# Patient Record
Sex: MALE | Race: WHITE | Employment: UNEMPLOYED | URBAN - METROPOLITAN AREA
[De-identification: names, ages, dates, MRNs, and addresses within clinical notes are randomized per-mention and may not be internally consistent; named-entity substitution may affect disease eponyms.]

---

## 2018-01-18 ENCOUNTER — HOSPITAL ENCOUNTER (INPATIENT)
Age: 78
LOS: 19 days | Discharge: HOME | DRG: 057 | End: 2018-02-06
Attending: PSYCHIATRY & NEUROLOGY | Admitting: PSYCHIATRY & NEUROLOGY
Payer: MEDICARE

## 2018-01-18 PROCEDURE — 65220000003 HC RM SEMIPRIVATE PSYCH

## 2018-01-18 RX ORDER — ZOLPIDEM TARTRATE 5 MG/1
5 TABLET ORAL
Status: DISCONTINUED | OUTPATIENT
Start: 2018-01-18 | End: 2018-02-06 | Stop reason: HOSPADM

## 2018-01-18 RX ORDER — TAMSULOSIN HYDROCHLORIDE 0.4 MG/1
0.4 CAPSULE ORAL DAILY
COMMUNITY

## 2018-01-18 RX ORDER — IBUPROFEN 200 MG
1 TABLET ORAL
Status: DISCONTINUED | OUTPATIENT
Start: 2018-01-18 | End: 2018-02-06 | Stop reason: HOSPADM

## 2018-01-18 RX ORDER — FACIAL-BODY WIPES
10 EACH TOPICAL
COMMUNITY

## 2018-01-18 RX ORDER — TRAZODONE HYDROCHLORIDE 100 MG/1
50 TABLET ORAL
Status: ON HOLD | COMMUNITY
End: 2018-01-19

## 2018-01-18 RX ORDER — AMOXICILLIN 250 MG
1 CAPSULE ORAL DAILY
Status: ON HOLD | COMMUNITY
End: 2018-01-19

## 2018-01-18 RX ORDER — FINASTERIDE 5 MG/1
5 TABLET, FILM COATED ORAL DAILY
COMMUNITY

## 2018-01-18 RX ORDER — MELATONIN 5 MG
5 CAPSULE ORAL
Status: ON HOLD | COMMUNITY
End: 2018-01-19

## 2018-01-18 RX ORDER — BENZTROPINE MESYLATE 1 MG/ML
1 INJECTION INTRAMUSCULAR; INTRAVENOUS
Status: DISCONTINUED | OUTPATIENT
Start: 2018-01-18 | End: 2018-02-06 | Stop reason: HOSPADM

## 2018-01-18 RX ORDER — KETOCONAZOLE 20 MG/ML
SHAMPOO TOPICAL DAILY PRN
COMMUNITY

## 2018-01-18 RX ORDER — ONDANSETRON 4 MG/1
4 TABLET, FILM COATED ORAL
COMMUNITY
End: 2018-02-06

## 2018-01-18 RX ORDER — POLYETHYLENE GLYCOL 3350 17 G/17G
17 POWDER, FOR SOLUTION ORAL DAILY
COMMUNITY

## 2018-01-18 RX ORDER — IBUPROFEN 400 MG/1
400 TABLET ORAL
Status: DISCONTINUED | OUTPATIENT
Start: 2018-01-18 | End: 2018-02-06 | Stop reason: HOSPADM

## 2018-01-18 RX ORDER — OLANZAPINE 2.5 MG/1
2.5 TABLET ORAL
Status: DISCONTINUED | OUTPATIENT
Start: 2018-01-18 | End: 2018-02-06 | Stop reason: HOSPADM

## 2018-01-18 RX ORDER — GUAIFENESIN 100 MG/5ML
81 LIQUID (ML) ORAL DAILY
COMMUNITY

## 2018-01-18 RX ORDER — RISPERIDONE 1 MG/1
1 TABLET, FILM COATED ORAL DAILY
Status: ON HOLD | COMMUNITY
End: 2018-01-19

## 2018-01-18 RX ORDER — ADHESIVE BANDAGE
30 BANDAGE TOPICAL DAILY PRN
Status: DISCONTINUED | OUTPATIENT
Start: 2018-01-18 | End: 2018-02-06 | Stop reason: HOSPADM

## 2018-01-18 RX ORDER — BENZTROPINE MESYLATE 1 MG/1
1 TABLET ORAL
Status: DISCONTINUED | OUTPATIENT
Start: 2018-01-18 | End: 2018-02-06 | Stop reason: HOSPADM

## 2018-01-18 RX ORDER — ACETAMINOPHEN 325 MG/1
650 TABLET ORAL
Status: DISCONTINUED | OUTPATIENT
Start: 2018-01-18 | End: 2018-02-06 | Stop reason: HOSPADM

## 2018-01-18 RX ORDER — TRIAMCINOLONE ACETONIDE 1 MG/G
OINTMENT TOPICAL 2 TIMES DAILY
Status: ON HOLD | COMMUNITY
End: 2018-01-19

## 2018-01-18 RX ORDER — DIVALPROEX SODIUM 500 MG/1
500 TABLET, EXTENDED RELEASE ORAL
Status: ON HOLD | COMMUNITY
End: 2018-01-19 | Stop reason: DRUGHIGH

## 2018-01-18 NOTE — IP AVS SNAPSHOT
[address] [facility], [place], operated by [facility] 
 
 
 [address] Patient: Marco Antonio Lam MRN: [id number] P:[birth year] About your hospitalization You were admitted on:  January 18, 2018 You last received care in the:  [facility]. 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 Amy Oakes [address] [place] [address], [address] [place] [address] Phone: [phone] Fax: [phone] None   None (395) Patient stated that they have no PCP Discharge Orders None A check stephanie indicates which time of day the medication should be taken. My Medications START taking these medications Instructions Each Dose to Equal  
 Morning Noon Evening Bedtime  
 memantine 10 mg tablet Commonly known as:  Leatha Parekh Your last dose was: Your next dose is: Take 1 Tab by mouth two (2) times a day for 7 days. Indications: MODERATE TO SEVERE ALZHEIMER'S TYPE DEMENTIA 10 mg  
    
   
   
   
  
 mirtazapine 15 mg tablet Commonly known as:  Veola Kitchen Your last dose was: Your next dose is: Take 1 Tab by mouth nightly for 7 days. Indications: major depressive disorder 15 mg  
    
   
   
   
  
 * risperiDONE 1 mg/mL oral solution Commonly known as:  RisperDAL Replaces:  risperiDONE 0.5 mg tablet Your last dose was: Your next dose is: Take 3 mL by mouth nightly for 7 days. Indications: DEPRESSION TREATMENT ADJUNCT, psychosis 3 mg * risperiDONE 1 mg/mL oral solution Commonly known as:  RisperDAL Start taking on:  2/7/2018 Replaces:  risperiDONE 1 mg tablet Your last dose was: Your next dose is: Take 1 mL by mouth daily for 7 days. Indications: DEPRESSION TREATMENT ADJUNCT, psychosis 1 mg * Notice: This list has 2 medication(s) that are the same as other medications prescribed for you. Read the directions carefully, and ask your doctor or other care provider to review them with you. CHANGE how you take these medications Instructions Each Dose to Equal  
 Morning Noon Evening Bedtime * aspirin 81 mg chewable tablet What changed:  Another medication with the same name was added. Make sure you understand how and when to take each. Your last dose was: Your next dose is: Take 81 mg by mouth daily. Indications: myocardial infarction prevention, prevention of cerebrovascular accident 81 mg  
    
   
   
   
  
 * aspirin 81 mg chewable tablet Start taking on:  2/7/2018 What changed: You were already taking a medication with the same name, and this prescription was added. Make sure you understand how and when to take each. Your last dose was: Your next dose is: Take 1 Tab by mouth daily for 7 days. Indications: myocardial infarction prevention, prevention of cerebrovascular accident 81 mg  
    
   
   
   
  
 * FLOMAX 0.4 mg capsule Generic drug:  tamsulosin What changed:  Another medication with the same name was added. Make sure you understand how and when to take each. Your last dose was: Your next dose is: Take 0.4 mg by mouth daily. Indications: benign prostatic hyperplasia with lower urinary tract sx  
 0.4 mg  
    
   
   
   
  
 * tamsulosin 0.4 mg capsule Commonly known as:  FLOMAX Start taking on:  2/7/2018 What changed: You were already taking a medication with the same name, and this prescription was added.  Make sure you understand how and when to take each. Your last dose was: Your next dose is: Take 1 Cap by mouth daily for 7 days. Indications: benign prostatic hyperplasia with lower urinary tract sx  
 0.4 mg  
    
   
   
   
  
 * polyethylene glycol 17 gram packet Commonly known as:  Gaylord Goodpasture What changed:  Another medication with the same name was added. Make sure you understand how and when to take each. Your last dose was: Your next dose is: Take 17 g by mouth daily. Indications: constipation 17 g * polyethylene glycol 17 gram packet Commonly known as:  Bhavesh Goodpasture Start taking on:  2/7/2018 What changed: You were already taking a medication with the same name, and this prescription was added. Make sure you understand how and when to take each. Your last dose was: Your next dose is: Take 1 Packet by mouth daily for 7 days. Indications: constipation 17 g * PROSCAR 5 mg tablet Generic drug:  finasteride What changed:  Another medication with the same name was added. Make sure you understand how and when to take each. Your last dose was: Your next dose is: Take 5 mg by mouth daily. Indications: benign prostatic hyperplasia with lower urinary tract sx  
 5 mg * finasteride 5 mg tablet Commonly known as:  PROSCAR Start taking on:  2/7/2018 What changed: You were already taking a medication with the same name, and this prescription was added. Make sure you understand how and when to take each. Your last dose was: Your next dose is: Take 1 Tab by mouth daily for 7 days. Indications: benign prostatic hyperplasia with lower urinary tract sx  
 5 mg * Senna 8.6 mg tablet Generic drug:  senna What changed:  Another medication with the same name was added. Make sure you understand how and when to take each. Your last dose was: Your next dose is: Take 2 Tabs by mouth nightly. Indications: constipation 2 Tab * senna 8.6 mg tablet Commonly known as:  Senna What changed: You were already taking a medication with the same name, and this prescription was added. Make sure you understand how and when to take each. Your last dose was: Your next dose is: Take 2 Tabs by mouth nightly for 7 days. Indications: constipation 2 Tab * Notice: This list has 10 medication(s) that are the same as other medications prescribed for you. Read the directions carefully, and ask your doctor or other care provider to review them with you. CONTINUE taking these medications Instructions Each Dose to Equal  
 Morning Noon Evening Bedtime DULCOLAX (BISACODYL) 10 mg suppository Generic drug:  bisacodyl Your last dose was: Your next dose is: Insert 10 mg into rectum daily as needed (Constipation). 10 mg  
    
   
   
   
  
 NIZORAL 2 % shampoo Generic drug:  ketoconazole Your last dose was: Your next dose is:    
   
   
 Apply  to affected area daily as needed for Itching. Patient typically uses 2-3 times/week  
     
   
   
   
  
 triamcinolone acetonide 0.1 % topical cream  
Commonly known as:  KENALOG Your last dose was: Your next dose is:    
   
   
 Apply  to affected area two (2) times daily as needed for Skin Irritation. use thin layer STOP taking these medications   
 risperiDONE 0.5 mg tablet Commonly known as:  RisperDAL Replaced by:  risperiDONE 1 mg/mL oral solution  
   
  
 risperiDONE 1 mg tablet Commonly known as:  RisperDAL Replaced by:  risperiDONE 1 mg/mL oral solution ZOFRAN (AS HYDROCHLORIDE) 4 mg tablet Generic drug:  ondansetron hcl ASK your doctor about these medications Instructions Each Dose to Equal  
 Morning Noon Evening Bedtime DEPAKOTE  mg ER tablet Generic drug:  divalproex ER Your last dose was: Your next dose is: Take 500 mg by mouth nightly. Indications: Behavioral disturbances associated with dementia  
 500 mg  
    
   
   
   
  
 * risperiDONE 0.25 mg tablet Commonly known as:  RisperDAL Your last dose was: Your next dose is: Take 0.25 mg by mouth every twelve (12) hours as needed (agitation). 0.25 mg  
    
   
   
   
  
 traZODone 50 mg tablet Commonly known as:  Selestine Baltimore Your last dose was: Your next dose is: Take 25 mg by mouth nightly as needed for Sleep. 25 mg  
    
   
   
   
  
 * Notice: This list has 1 medication(s) that are the same as other medications prescribed for you. Read the directions carefully, and ask your doctor or other care provider to review them with you. Where to Get Your Medications Information on where to get these meds will be given to you by the nurse or doctor. ! Ask your nurse or doctor about these medications  
  aspirin 81 mg chewable tablet  
 finasteride 5 mg tablet  
 memantine 10 mg tablet  
 mirtazapine 15 mg tablet  
 polyethylene glycol 17 gram packet  
 risperiDONE 1 mg/mL oral solution  
 risperiDONE 1 mg/mL oral solution  
 senna 8.6 mg tablet  
 tamsulosin 0.4 mg capsule Discharge Instructions DISCHARGE SUMMARY from Nurse The following personal items are in your possession at time of discharge: 
 
Dental Appliances: None Visual Aid: None Home Medications: None Jewelry: Watch Clothing: Pants, Slippers, Shirt, Undergarments, With patient, Footwear Other Valuables: Shannon Matos Personal Items Sent to Safe:  (wallet) PATIENT INSTRUCTIONS: 
 
 
 
If I feel that I can not keep these promises and I am at risk of hurting myself or others, I will call the crisis office and speak with a crisis worker who will assist me during my crisis. [address] [place] [address]  [phone] [address]   [phone] [facility]  [phone] [facility]  [phone] Lifestyle Tips: 
Appointment after discharge and follow up phone call: After being discharged, if your appointment does not work for you, please feel free to contact the physician's office to change the appointment. Medications: Take your medicines exactly as instructed. Ask your doctor or nurse if you have questions about your medicines. Tell your doctor right away if you have problems with your medicines. Activity: 
 Ask your doctor how active you should be. Remember to take rest breaks. Do not cross your legs when sitting. Elevate your legs when you can. Diet: 
 Follow any special diet orders from your doctor. Ask your doctor how much salt (sodium) you should have each day. Ask if you need to limit the amount or types of fluids you drink each day. Weight Monitoring: If you are overweight, ask about a weight loss plan that is right for you. Weight gain may mean that fluids are building up in your body. Weigh yourself every day at about the same time and write it down. Do Not Smoke: If you smoke, quit. Smoking is bad for your health. Avoid second hand smoke. Call the [address] for help at [phone] [facility]) Other Tips: 
 Avoid people with the flu or pneumonia Keep all of your doctor appointments Carry a list of your medications, allergies and the shots you have had These are general instructions for a healthy lifestyle: No smoking/ No tobacco products/ Avoid exposure to second hand smoke Surgeon General's Warning:  Quitting smoking now greatly reduces serious risk to your health. Obesity, smoking, and sedentary lifestyle greatly increases your risk for illness A healthy diet, regular physical exercise & weight monitoring are important for maintaining a healthy lifestyle You may be retaining fluid if you have a history of heart failure or if you experience any of the following symptoms:  Weight gain of 3 pounds or more overnight or 5 pounds in a week, increased swelling in our hands or feet or shortness of breath while lying flat in bed. Please call your doctor as soon as you notice any of these symptoms; do not wait until your next office visit. Recognize signs and symptoms of STROKE: 
 
F-face looks uneven A-arms unable to move or move unevenly S-speech slurred or non-existent T-time-call 911 as soon as signs and symptoms begin-DO NOT go Back to bed or wait to see if you get better-TIME IS BRAIN. Warning Signs of HEART ATTACK Call 911 if you have these symptoms: 
? Chest discomfort. Most heart attacks involve discomfort in the center of the chest that lasts more than a few minutes, or that goes away and comes back. It can feel like uncomfortable pressure, squeezing, fullness, or pain. ? Discomfort in other areas of the upper body. Symptoms can include pain or discomfort in one or both arms, the back, neck, jaw, or stomach. ? Shortness of breath with or without chest discomfort. ? Other signs may include breaking out in a cold sweat, nausea, or lightheadedness. Don't wait more than five minutes to call [address]! Fast action can save your life. Calling 911 is almost always the fastest way to get lifesaving treatment. Emergency Medical Services staff can begin treatment when they arrive  up to an hour sooner than if someone gets to the hospital by car. Myself and/or my family have received education about my diagnosis throughout my hospital stay.   During my hospital stay, I and/or my family/caregiver were included in planning my care upon discharge. My needs were taken into consideration and I was included in my discharge planning. I had an opportunity to ask questions. The discharge information has been reviewed with the patient. The patient verbalized understanding. Discharge medications reviewed with the patient and appropriate educational materials and side effects teaching were provided. [employer] Announcement We are excited to announce that we are making your provider's discharge notes available to you in [employer]. You will see these notes when they are completed and signed by the physician that discharged you from your recent hospital stay. If you have any questions or concerns about any information you see in [employer], please call the Health Information Department where you were seen or reach out to your Primary Care Provider for more information about your plan of care. Introducing [facility]! Alex Starkey introduces [employer] patient portal. Now you can access parts of your medical record, email your doctor's office, and request medication refills online. 1. In your internet browser, go to [email or web address] 2. Click on the First Time User? Click Here link in the Sign In box. You will see the New Member Sign Up page. 3. Enter your [employer] Access Code exactly as it appears below. You will not need to use this code after youve completed the sign-up process. If you do not sign up before the expiration date, you must request a new code. · [employer] Access Code: [email or web address] Expires: 5/7/2018 10:49 AM 
 
4. Enter the last four digits of your Social Security Number (xxxx) and Date of Birth (mm/dd/yyyy) as indicated and click Submit. You will be taken to the next sign-up page. 5. Create a [employer] ID.  This will be your [employer] login ID and cannot be changed, so think of one that is secure and easy to remember. 6. Create a [employer] password. You can change your password at any time. 7. Enter your Password Reset Question and Answer. This can be used at a later time if you forget your password. 8. Enter your e-mail address. You will receive e-mail notification when new information is available in [address] 9. Click Sign Up. You can now view and download portions of your medical record. 10. Click the Download Summary menu link to download a portable copy of your medical information. If you have questions, please visit the Frequently Asked Questions section of the [employer] website. Remember, [employer] is NOT to be used for urgent needs. For medical emergencies, dial 911. Now available from your iPhone and Android! Providers Seen During Your Hospitalization Provider Specialty Primary office phone Jozef Rosales MD Psychiatry [phone] Your Primary Care Physician (PCP) Primary Care Physician Office Phone Office Fax NONE ** None ** ** None ** You are allergic to the following Allergen Reactions Erythromycin Hives Isopropyl Alcohol Hives Neosporin (Neomycin-Bacitracnzn-Polymyxnb) Rash Pcn (Penicillins) Rash  
    
 Sulfa (Sulfonamide Antibiotics) Rash Recent Documentation Height Weight BMI Smoking Status 1.778 m 69.4 kg 21.95 kg/m2 Unknown If Ever Smoked Emergency Contacts Name Discharge Info Relation Home Work Mobile Not,Given N/A  AT THIS TIME [6] Other Relative [6] [phone] Patient Belongings The following personal items are in your possession at time of discharge: 
  Dental Appliances: None  Visual Aid: None      Home Medications: None   Jewelry: Watch  Clothing: Pants, Slippers, Shirt, Undergarments, With patient, Footwear    Other Valuables: Layman Adam Arora  Personal Items Sent to Safe:  (wallet) Please provide this summary of care documentation to your next provider. Signatures-by signing, you are acknowledging that this After Visit Summary has been reviewed with you and you have received a copy. Patient Signature:  ____________________________________________________________ Date:  ____________________________________________________________  
  
Shahram Kin Provider Signature:  ____________________________________________________________ Date:  ____________________________________________________________

## 2018-01-18 NOTE — IP AVS SNAPSHOT
[facility] 
 
 
 [address] [place] Patient: Frances Elder MRN: [id number] QFK:[id number] A check stephanie indicates which time of day the medication should be taken. My Medications START taking these medications Instructions Each Dose to Equal  
 Morning Noon Evening Bedtime  
 memantine 10 mg tablet Commonly known as:  Irina Iglesias Your last dose was: Your next dose is: Take 1 Tab by mouth two (2) times a day for 7 days. Indications: MODERATE TO SEVERE ALZHEIMER'S TYPE DEMENTIA 10 mg  
    
   
   
   
  
 mirtazapine 15 mg tablet Commonly known as:  Maria A Luna Your last dose was: Your next dose is: Take 1 Tab by mouth nightly for 7 days. Indications: major depressive disorder 15 mg  
    
   
   
   
  
 * risperiDONE 1 mg/mL oral solution Commonly known as:  RisperDAL Replaces:  risperiDONE 0.5 mg tablet Your last dose was: Your next dose is: Take 3 mL by mouth nightly for 7 days. Indications: DEPRESSION TREATMENT ADJUNCT, psychosis 3 mg * risperiDONE 1 mg/mL oral solution Commonly known as:  RisperDAL Start taking on:  2/7/2018 Replaces:  risperiDONE 1 mg tablet Your last dose was: Your next dose is: Take 1 mL by mouth daily for 7 days. Indications: DEPRESSION TREATMENT ADJUNCT, psychosis 1 mg * Notice: This list has 2 medication(s) that are the same as other medications prescribed for you. Read the directions carefully, and ask your doctor or other care provider to review them with you. CHANGE how you take these medications Instructions Each Dose to Equal  
 Morning Noon Evening Bedtime * aspirin 81 mg chewable tablet What changed:  Another medication with the same name was added. Make sure you understand how and when to take each. Your last dose was: Your next dose is: Take 81 mg by mouth daily. Indications: myocardial infarction prevention, prevention of cerebrovascular accident 81 mg  
    
   
   
   
  
 * aspirin 81 mg chewable tablet Start taking on:  2/7/2018 What changed: You were already taking a medication with the same name, and this prescription was added. Make sure you understand how and when to take each. Your last dose was: Your next dose is: Take 1 Tab by mouth daily for 7 days. Indications: myocardial infarction prevention, prevention of cerebrovascular accident 81 mg  
    
   
   
   
  
 * FLOMAX 0.4 mg capsule Generic drug:  tamsulosin What changed:  Another medication with the same name was added. Make sure you understand how and when to take each. Your last dose was: Your next dose is: Take 0.4 mg by mouth daily. Indications: benign prostatic hyperplasia with lower urinary tract sx  
 0.4 mg  
    
   
   
   
  
 * tamsulosin 0.4 mg capsule Commonly known as:  FLOMAX Start taking on:  2/7/2018 What changed: You were already taking a medication with the same name, and this prescription was added. Make sure you understand how and when to take each. Your last dose was: Your next dose is: Take 1 Cap by mouth daily for 7 days. Indications: benign prostatic hyperplasia with lower urinary tract sx  
 0.4 mg  
    
   
   
   
  
 * polyethylene glycol 17 gram packet Commonly known as:  Kaitlynn Canter What changed:  Another medication with the same name was added. Make sure you understand how and when to take each. Your last dose was: Your next dose is: Take 17 g by mouth daily. Indications: constipation 17 g * polyethylene glycol 17 gram packet Commonly known as:  Kaitlynn Canter Start taking on:  2/7/2018 What changed: You were already taking a medication with the same name, and this prescription was added. Make sure you understand how and when to take each. Your last dose was: Your next dose is: Take 1 Packet by mouth daily for 7 days. Indications: constipation 17 g * PROSCAR 5 mg tablet Generic drug:  finasteride What changed:  Another medication with the same name was added. Make sure you understand how and when to take each. Your last dose was: Your next dose is: Take 5 mg by mouth daily. Indications: benign prostatic hyperplasia with lower urinary tract sx  
 5 mg * finasteride 5 mg tablet Commonly known as:  PROSCAR Start taking on:  2/7/2018 What changed: You were already taking a medication with the same name, and this prescription was added. Make sure you understand how and when to take each. Your last dose was: Your next dose is: Take 1 Tab by mouth daily for 7 days. Indications: benign prostatic hyperplasia with lower urinary tract sx  
 5 mg * Senna 8.6 mg tablet Generic drug:  senna What changed:  Another medication with the same name was added. Make sure you understand how and when to take each. Your last dose was: Your next dose is: Take 2 Tabs by mouth nightly. Indications: constipation 2 Tab * senna 8.6 mg tablet Commonly known as:  Senna What changed: You were already taking a medication with the same name, and this prescription was added. Make sure you understand how and when to take each. Your last dose was: Your next dose is: Take 2 Tabs by mouth nightly for 7 days. Indications: constipation 2 Tab * Notice:   This list has 10 medication(s) that are the same as other medications prescribed for you. Read the directions carefully, and ask your doctor or other care provider to review them with you. CONTINUE taking these medications Instructions Each Dose to Equal  
 Morning Noon Evening Bedtime DULCOLAX (BISACODYL) 10 mg suppository Generic drug:  bisacodyl Your last dose was: Your next dose is: Insert 10 mg into rectum daily as needed (Constipation). 10 mg  
    
   
   
   
  
 NIZORAL 2 % shampoo Generic drug:  ketoconazole Your last dose was: Your next dose is:    
   
   
 Apply  to affected area daily as needed for Itching. Patient typically uses 2-3 times/week  
     
   
   
   
  
 triamcinolone acetonide 0.1 % topical cream  
Commonly known as:  KENALOG Your last dose was: Your next dose is:    
   
   
 Apply  to affected area two (2) times daily as needed for Skin Irritation. use thin layer STOP taking these medications   
 risperiDONE 0.5 mg tablet Commonly known as:  RisperDAL Replaced by:  risperiDONE 1 mg/mL oral solution  
   
  
 risperiDONE 1 mg tablet Commonly known as:  RisperDAL Replaced by:  risperiDONE 1 mg/mL oral solution ZOFRAN (AS HYDROCHLORIDE) 4 mg tablet Generic drug:  ondansetron hcl ASK your doctor about these medications Instructions Each Dose to Equal  
 Morning Noon Evening Bedtime DEPAKOTE  mg ER tablet Generic drug:  divalproex ER Your last dose was: Your next dose is: Take 500 mg by mouth nightly. Indications: Behavioral disturbances associated with dementia  
 500 mg  
    
   
   
   
  
 * risperiDONE 0.25 mg tablet Commonly known as:  RisperDAL Your last dose was: Your next dose is: Take 0.25 mg by mouth every twelve (12) hours as needed (agitation). 0.25 mg  
    
   
   
   
  
 traZODone 50 mg tablet Commonly known as:  Juan Thomason Your last dose was: Your next dose is: Take 25 mg by mouth nightly as needed for Sleep. 25 mg  
    
   
   
   
  
 * Notice: This list has 1 medication(s) that are the same as other medications prescribed for you. Read the directions carefully, and ask your doctor or other care provider to review them with you. Where to Get Your Medications Information on where to get these meds will be given to you by the nurse or doctor. ! Ask your nurse or doctor about these medications  
  aspirin 81 mg chewable tablet  
 finasteride 5 mg tablet  
 memantine 10 mg tablet  
 mirtazapine 15 mg tablet  
 polyethylene glycol 17 gram packet  
 risperiDONE 1 mg/mL oral solution  
 risperiDONE 1 mg/mL oral solution  
 senna 8.6 mg tablet  
 tamsulosin 0.4 mg capsule

## 2018-01-18 NOTE — IP AVS SNAPSHOT
Summary of Care Report The Summary of Care report has been created to help improve care coordination. Users with access to [employer] or [facility] [address] (Web-based application) may access additional patient information including the Discharge Summary. If you are not currently a [address] user and need more information, please call the number listed below in the [place] [address] section and ask to be connected with Medical Records. Facility Information Name Address Phone [address] [place] [id number] [phone] Patient Information Patient Name Sex NASRIN Short ([id number]) Male 1940 Discharge Information Admitting Provider Service Area Unit Nancy Mendez MD / [address] / [phone] Discharge Provider Discharge Date/Time Discharge Disposition Destination (none) 2018 Afternoon (Pending) AHR (none) Patient Language Language ENGLISH [13] Hospital Problems as of 2018  Never Reviewed Class Noted - Resolved Last Modified POA Active Problems Agitation  2018 - Present 2018 by Nancy Mendez MD Yes Entered by Nancy Mendez MD  
  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  
  
Non-Hospital Problems as of 2018  Never Reviewed Class Noted - Resolved Last Modified Active Problems Late onset Alzheimer's disease with behavioral disturbance  2018 - Present 2018 by Nancy Mendez MD  
  Entered by Nancy Mendez MD  
  
You are allergic to the following Allergen Reactions Erythromycin Hives Isopropyl Alcohol Hives Neosporin (Neomycin-Bacitracnzn-Polymyxnb) Rash Pcn (Penicillins) Rash  
    
 Sulfa (Sulfonamide Antibiotics) Rash Current Discharge Medication List  
  
START taking these medications Dose & Instructions Dispensing Information Comments  
 memantine 10 mg tablet Commonly known as:  Irene Price Dose:  10 mg Take 1 Tab by mouth two (2) times a day for 7 days. Indications: MODERATE TO SEVERE ALZHEIMER'S TYPE DEMENTIA Quantity:  14 Tab Refills:  0  
   
 mirtazapine 15 mg tablet Commonly known as:  Ford Levee Dose:  15 mg Take 1 Tab by mouth nightly for 7 days. Indications: major depressive disorder Quantity:  7 Tab Refills:  0  
   
 * risperiDONE 1 mg/mL oral solution Commonly known as:  RisperDAL Replaces:  risperiDONE 0.5 mg tablet Dose:  3 mg Take 3 mL by mouth nightly for 7 days. Indications: DEPRESSION TREATMENT ADJUNCT, psychosis Quantity:  21 mL Refills:  0  
   
 * risperiDONE 1 mg/mL oral solution Commonly known as:  RisperDAL Start taking on:  2/7/2018 Replaces:  risperiDONE 1 mg tablet Dose:  1 mg Take 1 mL by mouth daily for 7 days. Indications: DEPRESSION TREATMENT ADJUNCT, psychosis Quantity:  7 mL Refills:  0  
   
 * Notice: This list has 2 medication(s) that are the same as other medications prescribed for you. Read the directions carefully, and ask your doctor or other care provider to review them with you. CONTINUE these medications which have CHANGED Dose & Instructions Dispensing Information Comments * aspirin 81 mg chewable tablet What changed:  Another medication with the same name was added. Make sure you understand how and when to take each. Dose:  81 mg Take 81 mg by mouth daily. Indications: myocardial infarction prevention, prevention of cerebrovascular accident Refills:  0  
   
 * aspirin 81 mg chewable tablet Start taking on:  2/7/2018 What changed:   You were already taking a medication with the same name, and this prescription was added. Make sure you understand how and when to take each. Dose:  81 mg Take 1 Tab by mouth daily for 7 days. Indications: myocardial infarction prevention, prevention of cerebrovascular accident Quantity:  7 Tab Refills:  0  
   
 * FLOMAX 0.4 mg capsule Generic drug:  tamsulosin What changed:  Another medication with the same name was added. Make sure you understand how and when to take each. Dose:  0.4 mg Take 0.4 mg by mouth daily. Indications: benign prostatic hyperplasia with lower urinary tract sx Refills:  0  
   
 * tamsulosin 0.4 mg capsule Commonly known as:  FLOMAX Start taking on:  2/7/2018 What changed: You were already taking a medication with the same name, and this prescription was added. Make sure you understand how and when to take each. Dose:  0.4 mg Take 1 Cap by mouth daily for 7 days. Indications: benign prostatic hyperplasia with lower urinary tract sx Quantity:  7 Cap Refills:  0  
   
 * polyethylene glycol 17 gram packet Commonly known as:  Sallie Bless What changed:  Another medication with the same name was added. Make sure you understand how and when to take each. Dose:  17 g Take 17 g by mouth daily. Indications: constipation Refills:  0  
   
 * polyethylene glycol 17 gram packet Commonly known as:  Sallie Bless Start taking on:  2/7/2018 What changed: You were already taking a medication with the same name, and this prescription was added. Make sure you understand how and when to take each. Dose:  17 g Take 1 Packet by mouth daily for 7 days. Indications: constipation Quantity:  7 Packet Refills:  0  
   
 * PROSCAR 5 mg tablet Generic drug:  finasteride What changed:  Another medication with the same name was added. Make sure you understand how and when to take each. Dose:  5 mg Take 5 mg by mouth daily. Indications: benign prostatic hyperplasia with lower urinary tract sx Refills:  0 * finasteride 5 mg tablet Commonly known as:  PROSCAR Start taking on:  2/7/2018 What changed: You were already taking a medication with the same name, and this prescription was added. Make sure you understand how and when to take each. Dose:  5 mg Take 1 Tab by mouth daily for 7 days. Indications: benign prostatic hyperplasia with lower urinary tract sx Quantity:  7 Tab Refills:  0  
   
 * Senna 8.6 mg tablet Generic drug:  senna What changed:  Another medication with the same name was added. Make sure you understand how and when to take each. Dose:  2 Tab Take 2 Tabs by mouth nightly. Indications: constipation Refills:  0  
   
 * senna 8.6 mg tablet Commonly known as:  Senna What changed: You were already taking a medication with the same name, and this prescription was added. Make sure you understand how and when to take each. Dose:  2 Tab Take 2 Tabs by mouth nightly for 7 days. Indications: constipation Quantity:  14 Tab Refills:  0  
   
 * Notice: This list has 10 medication(s) that are the same as other medications prescribed for you. Read the directions carefully, and ask your doctor or other care provider to review them with you. CONTINUE these medications which have NOT CHANGED Dose & Instructions Dispensing Information Comments DULCOLAX (BISACODYL) 10 mg suppository Generic drug:  bisacodyl Dose:  10 mg Insert 10 mg into rectum daily as needed (Constipation). Refills:  0 NIZORAL 2 % shampoo Generic drug:  ketoconazole Apply  to affected area daily as needed for Itching. Patient typically uses 2-3 times/week Refills:  0  
   
 triamcinolone acetonide 0.1 % topical cream  
Commonly known as:  KENALOG Apply  to affected area two (2) times daily as needed for Skin Irritation. use thin layer Refills:  0 STOP taking these medications Comments  
 risperiDONE 0.5 mg tablet Commonly known as:  RisperDAL Replaced by:  risperiDONE 1 mg/mL oral solution  
   
   
 risperiDONE 1 mg tablet Commonly known as:  RisperDAL Replaced by:  risperiDONE 1 mg/mL oral solution ZOFRAN (AS HYDROCHLORIDE) 4 mg tablet Generic drug:  ondansetron hcl ASK your doctor about these medications Dose & Instructions Dispensing Information Comments DEPAKOTE  mg ER tablet Generic drug:  divalproex ER Dose:  500 mg Take 500 mg by mouth nightly. Indications: Behavioral disturbances associated with dementia Refills:  0  
   
 * risperiDONE 0.25 mg tablet Commonly known as:  RisperDAL Dose:  0.25 mg Take 0.25 mg by mouth every twelve (12) hours as needed (agitation). Refills:  0  
   
 traZODone 50 mg tablet Commonly known as:  Kailey Pacific Dose:  25 mg Take 25 mg by mouth nightly as needed for Sleep. Refills:  0  
   
 * Notice: This list has 1 medication(s) that are the same as other medications prescribed for you. Read the directions carefully, and ask your doctor or other care provider to review them with you. Follow-up Information Follow up With Details Comments Contact Info [facility]   Appointment 2/8/18 @ 1:15pm with Dr. Jeffery Todd [address] [place] [address] Phone: [phone] Fax: [phone] None   None (395) Patient stated that they have no PCP Discharge Instructions DISCHARGE SUMMARY from Nurse The following personal items are in your possession at time of discharge: 
 
Dental Appliances: None Visual Aid: None Home Medications: None Jewelry: Watch Clothing: Pants, Slippers, Shirt, Undergarments, With patient, Footwear Other Valuables: Oscar Stovall Personal Items Sent to Safe:  (wallet) PATIENT INSTRUCTIONS: 
 
 
 
If I feel that I can not keep these promises and I am at risk of hurting myself or others, I will call the crisis office and speak with a crisis worker who will assist me during my crisis. [address] [place] [address]  [phone] [address] [place] [address]   [phone] [address] [employer] [facility]  [phone] [place] [facility]  [phone] Lifestyle Tips: 
Appointment after discharge and follow up phone call: After being discharged, if your appointment does not work for you, please feel free to contact the physician's office to change the appointment. Medications: Take your medicines exactly as instructed. Ask your doctor or nurse if you have questions about your medicines. Tell your doctor right away if you have problems with your medicines. Activity: 
 Ask your doctor how active you should be. Remember to take rest breaks. Do not cross your legs when sitting. Elevate your legs when you can. Diet: 
 Follow any special diet orders from your doctor. Ask your doctor how much salt (sodium) you should have each day. Ask if you need to limit the amount or types of fluids you drink each day. Weight Monitoring: If you are overweight, ask about a weight loss plan that is right for you. Weight gain may mean that fluids are building up in your body. Weigh yourself every day at about the same time and write it down. Do Not Smoke: If you smoke, quit. Smoking is bad for your health. Avoid second hand smoke. Call the [address] for help at [phone] [facility]) Other Tips: 
 Avoid people with the flu or pneumonia Keep all of your doctor appointments Carry a list of your medications, allergies and the shots you have had These are general instructions for a healthy lifestyle: No smoking/ No tobacco products/ Avoid exposure to second hand smoke Surgeon General's Warning:  Quitting smoking now greatly reduces serious risk to your health. Obesity, smoking, and sedentary lifestyle greatly increases your risk for illness A healthy diet, regular physical exercise & weight monitoring are important for maintaining a healthy lifestyle You may be retaining fluid if you have a history of heart failure or if you experience any of the following symptoms:  Weight gain of 3 pounds or more overnight or 5 pounds in a week, increased swelling in our hands or feet or shortness of breath while lying flat in bed. Please call your doctor as soon as you notice any of these symptoms; do not wait until your next office visit. Recognize signs and symptoms of STROKE: 
 
F-face looks uneven A-arms unable to move or move unevenly S-speech slurred or non-existent T-time-call 911 as soon as signs and symptoms begin-DO NOT go Back to bed or wait to see if you get better-TIME IS BRAIN. Warning Signs of HEART ATTACK Call 911 if you have these symptoms: 
? Chest discomfort. Most heart attacks involve discomfort in the center of the chest that lasts more than a few minutes, or that goes away and comes back. It can feel like uncomfortable pressure, squeezing, fullness, or pain. ? Discomfort in other areas of the upper body. Symptoms can include pain or discomfort in one or both arms, the back, neck, jaw, or stomach. ? Shortness of breath with or without chest discomfort. ? Other signs may include breaking out in a cold sweat, nausea, or lightheadedness. Don't wait more than five minutes to call [address]! Fast action can save your life. Calling 911 is almost always the fastest way to get lifesaving treatment. Emergency Medical Services staff can begin treatment when they arrive  up to an hour sooner than if someone gets to the hospital by car. Myself and/or my family have received education about my diagnosis throughout my hospital stay.   During my hospital stay, I and/or my family/caregiver were included in planning my care upon discharge. My needs were taken into consideration and I was included in my discharge planning. I had an opportunity to ask questions. The discharge information has been reviewed with the patient. The patient verbalized understanding. Discharge medications reviewed with the patient and appropriate educational materials and side effects teaching were provided. Chart Review Routing History No Routing History on File

## 2018-01-19 PROBLEM — F02.818 DEMENTIA OF ALZHEIMER'S TYPE WITH BEHAVIORAL DISTURBANCE (HCC): Status: ACTIVE | Noted: 2018-01-19

## 2018-01-19 PROBLEM — F02.818 LATE ONSET ALZHEIMER'S DISEASE WITH BEHAVIORAL DISTURBANCE (HCC): Status: ACTIVE | Noted: 2018-01-19

## 2018-01-19 PROBLEM — R45.1 AGITATION: Status: ACTIVE | Noted: 2018-01-19

## 2018-01-19 PROBLEM — G30.1 LATE ONSET ALZHEIMER'S DISEASE WITH BEHAVIORAL DISTURBANCE (HCC): Status: ACTIVE | Noted: 2018-01-19

## 2018-01-19 PROBLEM — G30.9 DEMENTIA OF ALZHEIMER'S TYPE WITH BEHAVIORAL DISTURBANCE (HCC): Status: ACTIVE | Noted: 2018-01-19

## 2018-01-19 PROCEDURE — 74011250637 HC RX REV CODE- 250/637: Performed by: PSYCHIATRY & NEUROLOGY

## 2018-01-19 PROCEDURE — 65220000003 HC RM SEMIPRIVATE PSYCH

## 2018-01-19 RX ORDER — TAMSULOSIN HYDROCHLORIDE 0.4 MG/1
0.4 CAPSULE ORAL DAILY
Status: DISCONTINUED | OUTPATIENT
Start: 2018-01-19 | End: 2018-02-06 | Stop reason: HOSPADM

## 2018-01-19 RX ORDER — SENNOSIDES 8.6 MG/1
2 TABLET ORAL
COMMUNITY

## 2018-01-19 RX ORDER — TRAZODONE HYDROCHLORIDE 50 MG/1
25 TABLET ORAL
Status: ON HOLD | COMMUNITY
End: 2018-01-19 | Stop reason: DRUGHIGH

## 2018-01-19 RX ORDER — RISPERIDONE 0.5 MG/1
0.5 TABLET, FILM COATED ORAL 2 TIMES DAILY
COMMUNITY
End: 2018-02-06

## 2018-01-19 RX ORDER — RISPERIDONE 0.25 MG/1
0.5 TABLET, FILM COATED ORAL 2 TIMES DAILY
Status: DISCONTINUED | OUTPATIENT
Start: 2018-01-19 | End: 2018-01-21

## 2018-01-19 RX ORDER — TRIAMCINOLONE ACETONIDE 1 MG/G
CREAM TOPICAL
Status: DISCONTINUED | OUTPATIENT
Start: 2018-01-19 | End: 2018-02-06 | Stop reason: HOSPADM

## 2018-01-19 RX ORDER — RISPERIDONE 3 MG/1
3 TABLET, FILM COATED ORAL
Status: DISCONTINUED | OUTPATIENT
Start: 2018-01-19 | End: 2018-01-21

## 2018-01-19 RX ORDER — TRIAMCINOLONE ACETONIDE 1 MG/G
CREAM TOPICAL
COMMUNITY

## 2018-01-19 RX ORDER — SENNOSIDES 8.6 MG/1
2 TABLET ORAL
Status: DISCONTINUED | OUTPATIENT
Start: 2018-01-19 | End: 2018-02-06 | Stop reason: HOSPADM

## 2018-01-19 RX ORDER — POLYETHYLENE GLYCOL 3350 17 G/17G
17 POWDER, FOR SOLUTION ORAL DAILY
Status: DISCONTINUED | OUTPATIENT
Start: 2018-01-19 | End: 2018-02-06 | Stop reason: HOSPADM

## 2018-01-19 RX ORDER — RISPERIDONE 1 MG/1
3 TABLET, FILM COATED ORAL
COMMUNITY
End: 2018-02-06

## 2018-01-19 RX ORDER — GUAIFENESIN 100 MG/5ML
81 LIQUID (ML) ORAL DAILY
Status: DISCONTINUED | OUTPATIENT
Start: 2018-01-19 | End: 2018-02-06 | Stop reason: HOSPADM

## 2018-01-19 RX ORDER — FINASTERIDE 5 MG/1
5 TABLET, FILM COATED ORAL DAILY
Status: DISCONTINUED | OUTPATIENT
Start: 2018-01-19 | End: 2018-02-06 | Stop reason: HOSPADM

## 2018-01-19 RX ORDER — KETOCONAZOLE 20 MG/ML
SHAMPOO TOPICAL DAILY PRN
Status: DISCONTINUED | OUTPATIENT
Start: 2018-01-19 | End: 2018-02-06 | Stop reason: HOSPADM

## 2018-01-19 RX ORDER — RISPERIDONE 0.25 MG/1
0.25 TABLET, FILM COATED ORAL
Status: ON HOLD | COMMUNITY
End: 2018-01-19 | Stop reason: DRUGHIGH

## 2018-01-19 RX ORDER — FACIAL-BODY WIPES
10 EACH TOPICAL
Status: DISCONTINUED | OUTPATIENT
Start: 2018-01-19 | End: 2018-02-06 | Stop reason: HOSPADM

## 2018-01-19 RX ADMIN — OLANZAPINE 2.5 MG: 2.5 TABLET, FILM COATED ORAL at 20:11

## 2018-01-19 RX ADMIN — ASPIRIN 81 MG: 81 TABLET, CHEWABLE ORAL at 12:07

## 2018-01-19 RX ADMIN — RISPERIDONE 0.5 MG: 0.25 TABLET ORAL at 13:41

## 2018-01-19 RX ADMIN — FINASTERIDE 5 MG: 5 TABLET, FILM COATED ORAL at 12:07

## 2018-01-19 RX ADMIN — SENNOSIDES 17.2 MG: 8.6 TABLET, FILM COATED ORAL at 21:28

## 2018-01-19 RX ADMIN — POLYETHYLENE GLYCOL 3350 17 G: 17 POWDER, FOR SOLUTION ORAL at 12:07

## 2018-01-19 RX ADMIN — RISPERIDONE 3 MG: 3 TABLET ORAL at 21:28

## 2018-01-19 RX ADMIN — TAMSULOSIN HYDROCHLORIDE 0.4 MG: 0.4 CAPSULE ORAL at 12:07

## 2018-01-19 NOTE — CONSULTS
[address]    Alivia Dimas  MR#: [id number]  : 1940  ACCOUNT #: [de-identified]   DATE OF SERVICE: 2018    REFERRING PHYSICIAN:  Ward Cheadle, MD.      REASON FOR CONSULTATION:  Medical evaluation psychiatric admission. CHIEF COMPLAINT:  Delusions. HISTORY OF PRESENT ILLNESS:  This 20-year-old male presents delusional with a history of dementia, admitted for further psychiatric evaluation and treatment. Denies any chest pain, shortness of breath, nausea, vomiting or diarrhea. Does not know who his primary care physician  is. PAST MEDICAL HISTORY:  BPH, dementia, dermatitis, mental health disease. PAST SURGICAL HISTORY:  Tonsillectomy. ALLERGIES:  NEOSPORIN, ERYTHROMYCIN, PENICILLIN AND SULFA. MEDICATIONS:  Risperdal 3 mg at bedtime and 0.5 b.i.d., senna 8.6 mg at bedtime, Kenalog 0.1% cream, Proscar 5 mg at bedtime, Zofran 4 mg, MiraLax 17 grams daily, Flomax 0.4 daily. SOCIAL HISTORY:  Denies any tobacco, alcohol or illicit drug use. , has two kids. No grandkids. Says he is a retired . The majority of the history was obtained through medical records. PHYSICAL EXAMINATION:  VITAL SIGNS:  Blood pressure is 120/80, pulse 111, respirations 16, weight 153. GENERAL:  Pleasant, does raise his voice a lot, but in no acute distress. Had his eye shut. THROAT:  Oropharynx is clear. NECK:  Supple. LUNGS:  Clear to auscultation. No wheeze, rales, rhonchi. HEART:  Regular. No murmurs, gallops or rubs. ABDOMEN:  Soft, nontender, nondistended. Normoactive bowel sounds. No hepatosplenomegaly. EXTREMITIES:  No cyanosis, clubbing or edema. LABORATORY DATA:  Pending from outside. IMPRESSION:  A 20-year-old male with a past medical history of dementia, mental health disease, constipation, BPH, dermatitis, presents with delusions admitted for further psychiatric evaluation and treatment. PLAN:    1. Psychiatric management mental health disease. 2.  Take home meds once confirmed by nursing. 3.  Medically stable. Follow up on a p.r.n. basis. 4.  No VTE prophylaxis indicated or warranted at this time. Thank you for this consult.       Chen Duong MD DC / Eliu Mo  D: 01/19/2018 15:02     T: 01/19/2018 15:30  JOB #: [id number]

## 2018-01-19 NOTE — BH NOTES
Pt is disoriented x 4 and totally confused due to late stage AL/dementia. Pt is maintained safe in the unit. Pt is easily agitated due to confusion and pt is unable to follow directions. Will continue to keep pt safe.

## 2018-01-19 NOTE — BH NOTES
This 76year old male admitted to Geriatric unit under the services Dr. Eliu Padgett. Patient has diagnosis of Alzheimer/dementia. Patient has been living in an independent living environment and walks with a walker. Patient became aggressive over time, began yelling, cursing,banging on walls. Patient does not sleep well, up several times and walking outside of room in underwear. Patient is delusional and has been known to elope from previous living environment. Patient is labile and angry when redirected. Food and drink offered, patient refused. Assisted living place in [place] refused to take patient back and patient has been transferred for placement. Patient has family in [place]. The patients son is Rhiannon Rojas  # [phone].

## 2018-01-19 NOTE — PROGRESS NOTES
Problem: Altered Thought Process (Adult/Pediatric)  Goal: *STG: Remains safe in hospital  Outcome: Progressing Towards Goal  Pt slept 2 hours. Pt was very defensive and labile. He wanders at night in a confused and disoriented manner. He removes clothing and selectively responds to redirection. He requires constant redirection. No PRN's were given. Continuing to monitor with q15 min rounds for safety.

## 2018-01-19 NOTE — CONSULTS
Medical Consult for [facility] Patient    Consult H&P   dictated, see patient chart    Impression:    Kian Baca a 76 y.o. male with past medical history of mental health disease, bph, dermatitis, and dementia presents with behavioral health problems of delusions admitted for further psychiatric evaluation and treatment. Plan:   1. Psychiatry to manage mental health issues  2. Continuing home medications,once confirmed. 3. Medically stable at this time, will follow up as needed. 4. No VTE prophylaxis indicated or necessary at this time.      Thank you  Lenny Hameed MD  1/19/2018, 1:44 PM

## 2018-01-19 NOTE — BH NOTES
PSYCHOSOCIAL ASSESSMENT  :Patient identifying info:  Gianni Ansari is a 76 y.o., male admitted 1/18/2018  8:55 PM     Presenting problem and precipitating factors: Pt was admitted on a TDO due to increased  agitation at the assisted living facility. Pt has been more aggressive toward the nurses at the facility. Mental status assessment: Pt was alert and oriented to self. Pt mood was irritable and labile , affect congruent with mood. Pt's thought process is disorganized. Pt's memory and concentration is poor due to his dementia. Current psychiatric providers and contact info: unknown     Previous psychiatric services/providers and response to treatment: Pt has been diagnosised with dementia. Family history of mental illness  Pt's mother had dementia     Substance abuse history:    Social History   Substance Use Topics    Smoking status: Not on file    Smokeless tobacco: Not on file    Alcohol use Not on file   Past history drinking 10 glasses of wine     Family constellation: Pt's son Gianni Ansari, III (son) [phone] ,  Niya Bai (brother) [phone], Jose Juan Caldwell (son) lives in [facility] . Clinician talked with the pt's Naif Pringle III and provided him with the pt's code and hospital information. He was unaware that the pt couldn't return to the facility. He will reach out to them next week. Is significant other involved? Pt is        Describe support system: Pt's children and brother are his support system     Describe living arrangements and home environment: Pt was living at an independent  assisted living [facility] in [place] he is unable to return .      Health issues: Pt uses a walker     Trauma history: unknown     Legal issues: Pt has an advanced directive that appointments several members in his family to make health care decisions (filed in pt chart )    History of  service: unknown     Financial status: Pt receives disability     Anabaptism/cultural factors: unknown     Education/work history: Pt has a Doctorate  degree , he has written 6 books .  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

## 2018-01-19 NOTE — PROGRESS NOTES
Laboratory monitoring for mood stabilizer and antipsychotics:    Recommended baseline monitoring has been completed based on this patient's current medication regimen. The following labs were completed at transferring facility: WBC 6.7, Hgb 16, Hct 46.8, Plts 214, sodium 140, potassium 4, BUN 13, creatinine 0.8, glucose 105, calcium 9.2, AST 37, ALT 34, alk phos 83, total bili 0.8, albumin 4, TSH 0.88, UDS + benzo     The patient is currently taking the following medication(s):   Current Facility-Administered Medications   Medication Dose Route Frequency    aspirin chewable tablet 81 mg  81 mg Oral DAILY    finasteride (PROSCAR) tablet 5 mg  5 mg Oral DAILY    polyethylene glycol (MIRALAX) packet 17 g  17 g Oral DAILY    risperiDONE (RisperDAL) tablet 0.5 mg  0.5 mg Oral BID    risperiDONE (RisperDAL) tablet 3 mg  3 mg Oral QHS    senna (SENOKOT) tablet 17.2 mg  2 Tab Oral QHS    tamsulosin (FLOMAX) capsule 0.4 mg  0.4 mg Oral DAILY       Serum Drug Level(s)  CARBAMAZEPINE:  N/A    LITHIUM:  N/A    VALPROIC ACID:  N/A    Height, Weight, BMI Estimation  Estimated body mass index is 21.95 kg/(m^2) as calculated from the following:    Height as of this encounter: 177.8 cm (70\"). Weight as of this encounter: 69.4 kg (153 lb).      Renal Function, Hepatic Function and Chemistry  See above    Lab Results   Component Value Date/Time    Hemoglobin A1c 5.6 01/20/2018 04:06 AM       Hematology  See above    Lipids  Lab Results   Component Value Date/Time    Cholesterol, total 193 01/20/2018 04:06 AM    HDL Cholesterol 48 01/20/2018 04:06 AM    LDL, calculated 124.2 01/20/2018 04:06 AM    Triglyceride 104 01/20/2018 04:06 AM    CHOL/HDL Ratio 4.0 01/20/2018 04:06 AM       Thyroid Function  See above    Vitals  Visit Vitals    /77 (BP Patient Position: Sitting)    Pulse (!) 114    Temp 97 °F (36.1 °C)    Resp 18    Ht 177.8 cm (70\")    Wt 69.4 kg (153 lb)    BMI 21.95 kg/m2       Ming Hurt, [place], BCPS  Contact: [phone]

## 2018-01-19 NOTE — PROGRESS NOTES
Problem: Suicide/Homicide (Adult/Pediatric)  Goal: *STG: Attends activities and groups  [employer]  Master Treatment Plan for Janel Branch    Date Treatment Plan Initiated: *1/19/2018**    Treatment Plan Modalities:  Type of Modality Amount  (x minutes) Frequency (x/week) Duration (x days) Name of Responsible Staff   Community & wrap-up meetings to encourage peer interactions 30 5 5   SARAH Grider   Group psychotherapy to assist in building coping skills and internal controls       Therapeutic activity groups to build coping skills 60 5 5 Tammi Whitlock   Psychoeducation in group setting to address:   Medication education   61 2 2 Trix Krystle 85 skills         Relaxation techniques         Symptom management         Discharge planning         Spirituality          DEJAN         Recovery/AA/NA         Physician medication management         Family meeting/discharge planning not applicable

## 2018-01-19 NOTE — PROGRESS NOTES
[facility] Admission Pharmacy Medication Reconciliation     Recommendations/Findings:   1) Added: risperidone 0.25 mg, senna  2) Modified: divalproex, risperidone, trazodone, triamcinolone cream  3) Removed: melatonin (staff noted it was not effective), senna-docusate  4) Per [facility] nurse Autumn Ellis, the patient's 24/7 caregivers are responsible for administering him medications. The [facility] staff fill a medication pill box for the caregivers so she is not able to address patient adherence to his medications. Total Time Spent: 45 minutes    Information obtained from: [employer] ([phone]), transfer paperwork from [facility]    Patient allergies: Allergies as of 01/18/2018 - Review Complete 01/18/2018   Allergen Reaction Noted    Erythromycin Hives 01/18/2018    Isopropyl alcohol Hives 01/18/2018    Neosporin [neomycin-bacitracnzn-polymyxnb] Rash 01/18/2018    Pcn [penicillins] Rash 01/18/2018    Sulfa (sulfonamide antibiotics) Rash 01/18/2018       Prior to Admission Medications   Prescriptions Last Dose Informant Patient Reported? Taking?   aspirin 81 mg chewable tablet 1/18/2018 at Unknown time  Yes Yes   Sig: Take 81 mg by mouth daily. Indications: myocardial infarction prevention, prevention of cerebrovascular accident   bisacodyl (DULCOLAX, BISACODYL,) 10 mg suppository   Yes Yes   Sig: Insert 10 mg into rectum daily as needed (Constipation). divalproex ER (DEPAKOTE ER) 500 mg ER tablet 1/18/2018 at Unknown time  Yes Yes   Sig: Take 500 mg by mouth nightly. Indications: Behavioral disturbances associated with dementia   finasteride (PROSCAR) 5 mg tablet 1/18/2018 at Unknown time  Yes Yes   Sig: Take 5 mg by mouth daily. Indications: benign prostatic hyperplasia with lower urinary tract sx   ketoconazole (NIZORAL) 2 % shampoo   Yes Yes   Sig: Apply  to affected area daily as needed for Itching.  Patient typically uses 2-3 times/week   ondansetron hcl (ZOFRAN, AS HYDROCHLORIDE,) 4 mg tablet 1/18/2018 at Unknown time  Yes Yes   Sig: Take 4 mg by mouth every eight (8) hours as needed for Nausea. polyethylene glycol (MIRALAX) 17 gram packet   Yes Yes   Sig: Take 17 g by mouth daily. Indications: constipation   risperiDONE (RISPERDAL) 0.25 mg tablet   Yes Yes   Sig: Take 0.25 mg by mouth every twelve (12) hours as needed (agitation). risperiDONE (RISPERDAL) 0.5 mg tablet   Yes Yes   Sig: Take 0.5 mg by mouth two (2) times a day. At 6 AM and 2 PM   Indications: Behavioral disturbances associated with dementia   risperiDONE (RISPERDAL) 1 mg tablet   Yes Yes   Sig: Take 3 mg by mouth nightly. Indications: Behavioral disturbances associated with dementia   senna (SENNA) 8.6 mg tablet   Yes Yes   Sig: Take 2 Tabs by mouth nightly. Indications: constipation   tamsulosin (FLOMAX) 0.4 mg capsule   Yes Yes   Sig: Take 0.4 mg by mouth daily. Indications: benign prostatic hyperplasia with lower urinary tract sx   traZODone (DESYREL) 50 mg tablet   Yes Yes   Sig: Take 25 mg by mouth nightly as needed for Sleep.   triamcinolone acetonide (KENALOG) 0.1 % topical cream   Yes Yes   Sig: Apply  to affected area two (2) times daily as needed for Skin Irritation.  use thin layer      Facility-Administered Medications: None       Thank you,  [place] [employer], [facility]  [phone]

## 2018-01-20 LAB
CHOLEST SERPL-MCNC: 193 MG/DL
EST. AVERAGE GLUCOSE BLD GHB EST-MCNC: 114 MG/DL
HBA1C MFR BLD: 5.6 % (ref 4.2–6.3)
HDLC SERPL-MCNC: 48 MG/DL
HDLC SERPL: 4 {RATIO} (ref 0–5)
LDLC SERPL CALC-MCNC: 124.2 MG/DL (ref 0–100)
LIPID PROFILE,FLP: ABNORMAL
TRIGL SERPL-MCNC: 104 MG/DL (ref ?–150)
VLDLC SERPL CALC-MCNC: 20.8 MG/DL

## 2018-01-20 PROCEDURE — 74011250637 HC RX REV CODE- 250/637: Performed by: PSYCHIATRY & NEUROLOGY

## 2018-01-20 PROCEDURE — 65220000003 HC RM SEMIPRIVATE PSYCH

## 2018-01-20 PROCEDURE — 80061 LIPID PANEL: CPT | Performed by: PSYCHIATRY & NEUROLOGY

## 2018-01-20 PROCEDURE — 36415 COLL VENOUS BLD VENIPUNCTURE: CPT | Performed by: PSYCHIATRY & NEUROLOGY

## 2018-01-20 PROCEDURE — 83036 HEMOGLOBIN GLYCOSYLATED A1C: CPT | Performed by: PSYCHIATRY & NEUROLOGY

## 2018-01-20 RX ORDER — MIRTAZAPINE 15 MG/1
15 TABLET, FILM COATED ORAL
Status: DISCONTINUED | OUTPATIENT
Start: 2018-01-20 | End: 2018-02-06 | Stop reason: HOSPADM

## 2018-01-20 RX ADMIN — SENNOSIDES 17.2 MG: 8.6 TABLET, FILM COATED ORAL at 21:15

## 2018-01-20 RX ADMIN — RISPERIDONE 0.5 MG: 0.25 TABLET ORAL at 05:03

## 2018-01-20 RX ADMIN — RISPERIDONE 3 MG: 3 TABLET ORAL at 21:15

## 2018-01-20 RX ADMIN — OLANZAPINE 2.5 MG: 2.5 TABLET, FILM COATED ORAL at 07:58

## 2018-01-20 RX ADMIN — TAMSULOSIN HYDROCHLORIDE 0.4 MG: 0.4 CAPSULE ORAL at 07:58

## 2018-01-20 RX ADMIN — RISPERIDONE 0.5 MG: 0.25 TABLET ORAL at 13:35

## 2018-01-20 RX ADMIN — POLYETHYLENE GLYCOL 3350 17 G: 17 POWDER, FOR SOLUTION ORAL at 07:58

## 2018-01-20 RX ADMIN — ASPIRIN 81 MG: 81 TABLET, CHEWABLE ORAL at 07:58

## 2018-01-20 RX ADMIN — MIRTAZAPINE 15 MG: 15 TABLET, FILM COATED ORAL at 21:15

## 2018-01-20 RX ADMIN — FINASTERIDE 5 MG: 5 TABLET, FILM COATED ORAL at 07:57

## 2018-01-20 NOTE — BH NOTES
PSYCHIATRIC PROGRESS NOTE         Patient Name  Diana Duran   Date of Birth 1940   [facility] [id number]   Medical Record Number  [id number]      Age  76 y.o. PCP None   Admit date:  1/18/2018    Room Number  321/01  @ [facility]   Date of Service  1/20/2018           E & M PROGRESS NOTE:         HISTORY       CC:  \" confused , disoriented\"  HISTORY OF PRESENT ILLNESS/INTERVAL HISTORY:  (reviewed/updated 1/20/2018). per initial evaluation: The patient, Diana Duran, is a 76 y.o. WHITE [place]  male with a past psychiatric history significant for dementia, who presents at this time with complaints of (and/or evidence of) the following emotional symptoms: agitation and memory impairment. Additional symptomatology include poor attention, confused, disoriented to time and place. He is a poor historian, distractible, loud and wandering behavior. The above symptoms have been present since last october. These symptoms are of severe severity. These symptoms are constant  in nature. The patient's condition has been precipitated by and psychosocial stressors ( ). Patient's condition made worse by treatment noncompliance. UDS: +benzo; BAL=0. Diana Duran presents/reports/evidences the following emotional symptoms today, 1/20/2018:agitation and paranoid behavior. The above symptoms have been present for few months. These symptoms are of severe severity. The symptoms are constant in nature. Additional symptomatology and features include memory impairment, episodic agitation and poor sleep. Pt has been posturing and threatening staff and received prn med. Wandering behavior but redirectable. SIDE EFFECTS: (reviewed/updated 1/20/2018)  None reported or admitted to.   No noted toxicity with use of Depakote/Tegretol/lithium/Clozaril/TCAs   ALLERGIES:(reviewed/updated 1/20/2018)  Allergies   Allergen Reactions    Erythromycin Hives    Isopropyl Alcohol Hives    Neosporin [Neomycin-Bacitracnzn-Polymyxnb] Rash    Pcn [Penicillins] Rash    Sulfa (Sulfonamide Antibiotics) Rash      MEDICATIONS PRIOR TO ADMISSION:(reviewed/updated 1/20/2018)  Prescriptions Prior to Admission   Medication Sig    risperiDONE (RISPERDAL) 1 mg tablet Take 3 mg by mouth nightly. Indications: Behavioral disturbances associated with dementia    risperiDONE (RISPERDAL) 0.5 mg tablet Take 0.5 mg by mouth two (2) times a day. At 6 AM and 2 PM   Indications: Behavioral disturbances associated with dementia    senna (SENNA) 8.6 mg tablet Take 2 Tabs by mouth nightly. Indications: constipation    triamcinolone acetonide (KENALOG) 0.1 % topical cream Apply  to affected area two (2) times daily as needed for Skin Irritation. use thin layer    aspirin 81 mg chewable tablet Take 81 mg by mouth daily. Indications: myocardial infarction prevention, prevention of cerebrovascular accident    bisacodyl (DULCOLAX, BISACODYL,) 10 mg suppository Insert 10 mg into rectum daily as needed (Constipation).  finasteride (PROSCAR) 5 mg tablet Take 5 mg by mouth daily. Indications: benign prostatic hyperplasia with lower urinary tract sx    ketoconazole (NIZORAL) 2 % shampoo Apply  to affected area daily as needed for Itching. Patient typically uses 2-3 times/week    ondansetron hcl (ZOFRAN, AS HYDROCHLORIDE,) 4 mg tablet Take 4 mg by mouth every eight (8) hours as needed for Nausea.  polyethylene glycol (MIRALAX) 17 gram packet Take 17 g by mouth daily. Indications: constipation    tamsulosin (FLOMAX) 0.4 mg capsule Take 0.4 mg by mouth daily. Indications: benign prostatic hyperplasia with lower urinary tract sx      PAST MEDICAL HISTORY: Past medical history from the initial psychiatric evaluation has been reviewed (reviewed/updated 1/20/2018) with no additional updates (I asked patient and no additional past medical history provided).  Past Medical History:   Diagnosis Date    BPH (benign prostatic hyperplasia)    No past surgical history on file. SOCIAL HISTORY: Social history from the initial psychiatric evaluation has been reviewed (reviewed/updated 1/20/2018) with no additional updates (I asked patient and no additional social history provided). Social History     Social History    Marital status: SINGLE     Spouse name: N/A    Number of children: N/A    Years of education: N/A     Occupational History    Not on file. 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 No family history on file.     REVIEW OF SYSTEMS: (reviewed/updated 1/20/2018)  Appetite:no change from normal   Sleep: does not feel rested and poor with DIMS (difficulty initiating & maintaining sleep)   All other Review of Systems: Psychological ROS: positive for - behavioral disorder, concentration difficulties, disorientation, hostility, memory difficulties and sleep disturbances  Respiratory ROS: no cough, shortness of breath, or wheezing  Cardiovascular ROS: no chest pain or dyspnea on exertion         [facility] (MSE):    MSE FINDINGS ARE WITHIN NORMAL LIMITS (WNL) UNLESS OTHERWISE STATED BELOW. ( ALL OF THE BELOW CATEGORIES OF THE MSE HAVE BEEN REVIEWED (reviewed 1/20/2018) AND UPDATED AS DEEMED APPROPRIATE )  General Presentation age appropriate and disheveled, uncooperative and unreliable   Orientation confused, Alert and Oriented x 1   Vital Signs  See below (reviewed 1/20/2018); Vital Signs (BP, Pulse, & Temp) are within normal limits if not listed below.    Gait and Station Stable/steady, no ataxia   Musculoskeletal System No extrapyramidal symptoms (EPS); no abnormal muscular movements or Tardive Dyskinesia (TD); muscle strength and tone are within normal limits   Language No aphasia or dysarthria   Speech:  monotone and pressured   Thought Processes illogical; slow rate of thoughts; poor abstract reasoning/computation   Thought Associations tangential   Thought Content paranoid delusions, free of hallucinations, confabulation  and internally preoccupied   Suicidal Ideations none   Homicidal Ideations none   Mood:  hostile  and anxious    Affect:  anxious, hostile, increased in intensity and mood-incongruent   Memory recent  impaired   Memory remote:  impaired   Concentration/Attention:  distractable   Fund of Knowledge average   Insight:  poor   Reliability poor   Judgment:  poor          VITALS:     Patient Vitals for the past 24 hrs:   Temp Pulse Resp BP   01/20/18 0420 97 °F (36.1 °C) (!) 114 18 118/77     Wt Readings from Last 3 Encounters:   01/18/18 69.4 kg (153 lb)     Temp Readings from Last 3 Encounters:   01/20/18 97 °F (36.1 °C)     BP Readings from Last 3 Encounters:   01/20/18 118/77     Pulse Readings from Last 3 Encounters:   01/20/18 (!) 114            DATA     LABORATORY DATA:(reviewed/updated 1/20/2018)  Recent Results (from the past 24 hour(s))   LIPID PANEL    Collection Time: 01/20/18  4:06 AM   Result Value Ref Range    LIPID PROFILE          Cholesterol, total 193 <200 MG/DL    Triglyceride 104 <150 MG/DL    HDL Cholesterol 48 MG/DL    LDL, calculated 124.2 (H) 0 - 100 MG/DL    VLDL, calculated 20.8 MG/DL    CHOL/HDL Ratio 4.0 0 - 5.0     HEMOGLOBIN A1C WITH EAG    Collection Time: 01/20/18  4:06 AM   Result Value Ref Range    Hemoglobin A1c 5.6 4.2 - 6.3 %    Est. average glucose 114 mg/dL     No results found for: VALF2, VALAC, VALP, VALPR, DS6, CRBAM, CRBAMP, CARB2, XCRBAM  No results found for: LITHM   RADIOLOGY REPORTS:(reviewed/updated 1/20/2018)  No results found.        MEDICATIONS     ALL MEDICATIONS:   Current Facility-Administered Medications   Medication Dose Route Frequency    aspirin chewable tablet 81 mg  81 mg Oral DAILY    bisacodyl (DULCOLAX) suppository 10 mg  10 mg Rectal DAILY PRN    finasteride (PROSCAR) tablet 5 mg  5 mg Oral DAILY    ketoconazole (NIZORAL) 2 % shampoo   Topical DAILY PRN    polyethylene glycol (MIRALAX) packet 17 g  17 g Oral DAILY    risperiDONE (RisperDAL) tablet 0.5 mg  0.5 mg Oral BID    risperiDONE (RisperDAL) tablet 3 mg  3 mg Oral QHS    senna (SENOKOT) tablet 17.2 mg  2 Tab Oral QHS    tamsulosin (FLOMAX) capsule 0.4 mg  0.4 mg Oral DAILY    triamcinolone acetonide (KENALOG) 0.1 % cream   Topical BID PRN    OLANZapine (ZyPREXA) tablet 2.5 mg  2.5 mg Oral Q6H PRN    ziprasidone (GEODON) 10 mg in sterile water (preservative free) 0.5 mL injection  10 mg IntraMUSCular BID PRN    benztropine (COGENTIN) tablet 1 mg  1 mg Oral BID PRN    benztropine (COGENTIN) injection 1 mg  1 mg IntraMUSCular BID PRN    zolpidem (AMBIEN) tablet 5 mg  5 mg Oral QHS PRN    acetaminophen (TYLENOL) tablet 650 mg  650 mg Oral Q4H PRN    ibuprofen (MOTRIN) tablet 400 mg  400 mg Oral Q8H PRN    magnesium hydroxide (MILK OF MAGNESIA) 400 mg/5 mL oral suspension 30 mL  30 mL Oral DAILY PRN    nicotine (NICODERM CQ) 21 mg/24 hr patch 1 Patch  1 Patch TransDERmal DAILY PRN      SCHEDULED MEDICATIONS:   Current Facility-Administered Medications   Medication Dose Route Frequency    aspirin chewable tablet 81 mg  81 mg Oral DAILY    finasteride (PROSCAR) tablet 5 mg  5 mg Oral DAILY    polyethylene glycol (MIRALAX) packet 17 g  17 g Oral DAILY    risperiDONE (RisperDAL) tablet 0.5 mg  0.5 mg Oral BID    risperiDONE (RisperDAL) tablet 3 mg  3 mg Oral QHS    senna (SENOKOT) tablet 17.2 mg  2 Tab Oral QHS    tamsulosin (FLOMAX) capsule 0.4 mg  0.4 mg Oral DAILY          ASSESSMENT & PLAN     DIAGNOSES REQUIRING ACTIVE TREATMENT AND MONITORING: (reviewed/updated 1/20/2018)  Patient Active Hospital Problem List:   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 in dec and recently worsening of sx. Plan: will ct with Risperdal add Remeron. Agitation (1/19/2018)    Assessment: severe, no aggression but tendency to get loud, scream and curse staff, need several re direction    Plan: prn med              I will continue to monitor blood levels (Depakote---a drug with a narrow therapeutic index= NTI) and associated labs for drug therapy implemented that require intense monitoring for toxicity as deemed appropriate based on current medication side effects and pharmacodynamically determined drug 1/2 lives. In summary, Umberto Chappell, is a 76 y.o.  male who presents with a severe exacerbation of the principal diagnosis of 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  Patient's condition is worsening/not improving/not stable. Patient requires continued inpatient hospitalization for further stabilization, safety monitoring and medication management. I will continue to coordinate the provision of individual, milieu, occupational, group, and substance abuse therapies to address target symptoms/diagnoses as deemed appropriate for the individual patient. A coordinated, multidisplinary treatment team round was conducted with the patient (this team consists of the nurse, psychiatric unit pharmcist,  and writer). Complete current electronic health record for patient has been reviewed today including consultant notes, ancillary staff notes, nurses and psychiatric tech notes.     Suicide risk assessment completed and patient deemed to be of low risk for suicide at this time. The following regarding medications was addressed during rounds with patient:   the risks and benefits of the proposed medication. The patient was given the opportunity to ask questions. Informed consent given to the use of the above medications. Will continue to adjust psychiatric and non-psychiatric medications (see above \"medication\" section and orders section for details) as deemed appropriate & based upon diagnoses and response to treatment. I will continue to order blood tests/labs and diagnostic tests as deemed appropriate and review results as they become available (see orders for details and above listed lab/test results). I will order psychiatric records from previous [facility] hospitals to further elucidate the nature of patient's psychopathology and review once available. I will gather additional collateral information from friends, family and o/p treatment team to further elucidate the nature of patient's psychopathology and baselline level of psychiatric functioning. I certify that this patient's inpatient psychiatric hospital services furnished since the previous certification were, and continue to be, required for treatment that could reasonably be expected to improve the patient's condition, or for diagnostic study, and that the patient continues to need, on a daily basis, active treatment furnished directly by or requiring the supervision of inpatient psychiatric facility personnel. In addition, the hospital records show that services furnished were intensive treatment services, admission or related services, or equivalent services.     EXPECTED DISCHARGE DATE/DAY: TBD     DISPOSITION: Home       Signed By:   Nile Damon MD  1/20/2018

## 2018-01-20 NOTE — H&P
INITIAL PSYCHIATRIC EVALUATION            IDENTIFICATION:    Patient Name  Lizz Ellis   Date of Birth 1940   [facility] [id number]   Medical Record Number  [id number]      Age  76 y.o. PCP None   Admit date:  1/18/2018    Room Number  321/01  @ [facility]   Date of Service  1/19/2018            HISTORY         REASON FOR HOSPITALIZATION:  CC: \"confused, disoriented\". Pt admitted under a temporary halfway order (TDO) for dementia with agitation, psychosis proving to be an imminent danger to self and others and an inability to care for self. HISTORY OF PRESENT ILLNESS:    The patient, Lizz Ellis, is a 76 y.o. WHITE [place]  male with a past psychiatric history significant for dementia, who presents at this time with complaints of (and/or evidence of) the following emotional symptoms: agitation and memory impairment. Additional symptomatology include poor attention, confused, disoriented to time and place. He is a poor historian, distractible, loud and wandering behavior. The above symptoms have been present since last october. These symptoms are of severe severity. These symptoms are constant  in nature. The patient's condition has been precipitated by and psychosocial stressors ( ). Patient's condition made worse by treatment noncompliance. UDS: +benzo; BAL=0. ALLERGIES:   Allergies   Allergen Reactions    Erythromycin Hives    Isopropyl Alcohol Hives    Neosporin [Neomycin-Bacitracnzn-Polymyxnb] Rash    Pcn [Penicillins] Rash    Sulfa (Sulfonamide Antibiotics) Rash      MEDICATIONS PRIOR TO ADMISSION:   Prescriptions Prior to Admission   Medication Sig    risperiDONE (RISPERDAL) 1 mg tablet Take 3 mg by mouth nightly. Indications: Behavioral disturbances associated with dementia    risperiDONE (RISPERDAL) 0.5 mg tablet Take 0.5 mg by mouth two (2) times a day.  At 6 AM and 2 PM   Indications: Behavioral disturbances associated with dementia    senna (SENNA) 8.6 mg tablet Take 2 Tabs by mouth nightly. Indications: constipation    triamcinolone acetonide (KENALOG) 0.1 % topical cream Apply  to affected area two (2) times daily as needed for Skin Irritation. use thin layer    aspirin 81 mg chewable tablet Take 81 mg by mouth daily. Indications: myocardial infarction prevention, prevention of cerebrovascular accident    bisacodyl (DULCOLAX, BISACODYL,) 10 mg suppository Insert 10 mg into rectum daily as needed (Constipation).  finasteride (PROSCAR) 5 mg tablet Take 5 mg by mouth daily. Indications: benign prostatic hyperplasia with lower urinary tract sx    ketoconazole (NIZORAL) 2 % shampoo Apply  to affected area daily as needed for Itching. Patient typically uses 2-3 times/week    ondansetron hcl (ZOFRAN, AS HYDROCHLORIDE,) 4 mg tablet Take 4 mg by mouth every eight (8) hours as needed for Nausea.  polyethylene glycol (MIRALAX) 17 gram packet Take 17 g by mouth daily. Indications: constipation    tamsulosin (FLOMAX) 0.4 mg capsule Take 0.4 mg by mouth daily. Indications: benign prostatic hyperplasia with lower urinary tract sx      PAST MEDICAL HISTORY:   Past Medical History:   Diagnosis Date    BPH (benign prostatic hyperplasia)    No past surgical history on file. SOCIAL HISTORY:    Social History     Social History    Marital status: SINGLE     Spouse name: N/A    Number of children: N/A    Years of education: N/A     Occupational History    Not on file. 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 No pertinent family history. No family history on file. REVIEW OF SYSTEMS:   Psychological ROS: positive for - behavioral disorder, concentration difficulties, disorientation, hostility, memory difficulties and sleep disturbances  Pertinent items are noted in the History of Present Illness. All other Systems reviewed and are considered negative. MENTAL STATUS EXAM & VITALS     MENTAL STATUS EXAM (MSE):    MSE FINDINGS ARE WITHIN NORMAL LIMITS (WNL) UNLESS OTHERWISE STATED BELOW. ( ALL OF THE BELOW CATEGORIES OF THE MSE HAVE BEEN REVIEWED (reviewed 1/19/2018) AND UPDATED AS DEEMED APPROPRIATE )  General Presentation age appropriate and disheveled, evasive, unreliable and vague   Orientation Alert and Oriented x 1   Vital Signs  See below (reviewed 1/19/2018); Vital Signs (BP, Pulse, & Temp) are within normal limits if not listed below.    Gait and Station Stable/steady, no ataxia   Musculoskeletal System No extrapyramidal symptoms (EPS); no abnormal muscular movements or Tardive Dyskinesia (TD); muscle strength and tone are within normal limits   Language No aphasia or dysarthria   Speech:  loud and pressured   Thought Processes illogical; slow rate of thoughts; poor abstract reasoning/computation   Thought Associations loose associations   Thought Content paranoid delusions, free of hallucinations and internally preoccupied   Suicidal Ideations none   Homicidal Ideations none   Mood:  hostile  and labile    Affect:  anxious, hostile, increased in intensity and labile   Memory recent  impaired   Memory remote:  impaired   Concentration/Attention:  distractable   Fund of Knowledge average   Insight:  poor   Reliability poor   Judgment:  poor          VITALS:     Patient Vitals for the past 24 hrs:   Pulse Resp BP   01/18/18 2153 (!) 111 16 120/80     Wt Readings from Last 3 Encounters:   01/18/18 69.4 kg (153 lb)     Temp Readings from Last 3 Encounters:   No data found for Temp     BP Readings from Last 3 Encounters:   01/18/18 120/80     Pulse Readings from Last 3 Encounters:   01/18/18 (!) 111            DATA     LABORATORY DATA:  Labs Reviewed - No data to display  No results found for any previous visit. RADIOLOGY REPORTS:  No results found for this or any previous visit. No results found.            MEDICATIONS       ALL MEDICATIONS  Current Facility-Administered Medications   Medication Dose Route Frequency    aspirin chewable tablet 81 mg  81 mg Oral DAILY    bisacodyl (DULCOLAX) suppository 10 mg  10 mg Rectal DAILY PRN    finasteride (PROSCAR) tablet 5 mg  5 mg Oral DAILY    ketoconazole (NIZORAL) 2 % shampoo   Topical DAILY PRN    polyethylene glycol (MIRALAX) packet 17 g  17 g Oral DAILY    risperiDONE (RisperDAL) tablet 0.5 mg  0.5 mg Oral BID    risperiDONE (RisperDAL) tablet 3 mg  3 mg Oral QHS    senna (SENOKOT) tablet 17.2 mg  2 Tab Oral QHS    tamsulosin (FLOMAX) capsule 0.4 mg  0.4 mg Oral DAILY    triamcinolone acetonide (KENALOG) 0.1 % cream   Topical BID PRN    OLANZapine (ZyPREXA) tablet 2.5 mg  2.5 mg Oral Q6H PRN    ziprasidone (GEODON) 10 mg in sterile water (preservative free) 0.5 mL injection  10 mg IntraMUSCular BID PRN    benztropine (COGENTIN) tablet 1 mg  1 mg Oral BID PRN    benztropine (COGENTIN) injection 1 mg  1 mg IntraMUSCular BID PRN    zolpidem (AMBIEN) tablet 5 mg  5 mg Oral QHS PRN    acetaminophen (TYLENOL) tablet 650 mg  650 mg Oral Q4H PRN    ibuprofen (MOTRIN) tablet 400 mg  400 mg Oral Q8H PRN    magnesium hydroxide (MILK OF MAGNESIA) 400 mg/5 mL oral suspension 30 mL  30 mL Oral DAILY PRN    nicotine (NICODERM CQ) 21 mg/24 hr patch 1 Patch  1 Patch TransDERmal DAILY PRN      SCHEDULED MEDICATIONS  Current Facility-Administered Medications   Medication Dose Route Frequency    aspirin chewable tablet 81 mg  81 mg Oral DAILY    finasteride (PROSCAR) tablet 5 mg  5 mg Oral DAILY    polyethylene glycol (MIRALAX) packet 17 g  17 g Oral DAILY    risperiDONE (RisperDAL) tablet 0.5 mg  0.5 mg Oral BID    risperiDONE (RisperDAL) tablet 3 mg  3 mg Oral QHS    senna (SENOKOT) tablet 17.2 mg  2 Tab Oral QHS    tamsulosin (FLOMAX) capsule 0.4 mg  0.4 mg Oral DAILY                ASSESSMENT & PLAN        The patient, Thomas Flores, is a 76 y.o.  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 Plan: will ct with Risperdal and Depakote for now- consider changing AP   Agitation (1/19/2018)    Assessment: severe, no aggression but tendency to get loud, scream and curse staff, need several re direction    Plan: prn med          I will continue to monitor blood levels (Depakote, ---a drug with a narrow therapeutic index= NTI) and associated labs for drug therapy implemented that require intense monitoring for toxicity as deemed appropriate based on current medication side effects and pharmacodynamically determined drug 1/2 lives. A coordinated, multidisplinary treatment team (includes the nurse, unit pharmcist,  and writer) round was conducted for this initial evaluation with the patient present. The following regarding medications was addressed during rounds with patient:   the risks and benefits of the proposed medication. The patient was given the opportunity to ask questions. Informed consent given to the use of the above medications. I will continue to adjust psychiatric and non-psychiatric medications (see above \"medication\" section and orders section for details) as deemed appropriate & based upon diagnoses and response to treatment. I have reviewed admission (and previous/old) labs and medical tests in the EHR and or transferring hospital documents.  I will continue to order blood tests/labs and diagnostic tests as deemed appropriate and review results as they become available (see orders for details). I have reviewed old psychiatric and medical records available in the EHR. I Will order additional psychiatric records from other institutions to further elucidate the nature of patient's psychopathology and review once available. I will gather additional collateral information from friends, family and o/p treatment team to further elucidate the nature of patient's psychopathology and baselline level of psychiatric functioning.       ESTIMATED LENGTH OF STAY:    TBD       STRENGTHS:  Access to housing/residential stability and Interpersonal/supportive relationships (family, friends, peers)                                        SIGNED:    Saintclair Estelle, MD  1/19/2018

## 2018-01-20 NOTE — BH NOTES
Pt remains agitated by evidence of yelling and screaming and posturing with his fist when pt is approached by staff for care and directions. Pt was administered PRN medication for agitation. Pt remains totally disoriented and confused due to cognitive impairment. Will continue to monitor q 15 for safety, mood and behavior changes.

## 2018-01-20 NOTE — BH NOTES
Pt got up and ate most of his dinner this evening. Pt also finished most of his lunch earlier today. Pt remains oppositional to staff directions. Long time and efforts required for pt to take his medications. Pt is kept safe. Will continue to monitor q 15 for safety, mood and behavior changes.

## 2018-01-20 NOTE — BH NOTES
1/20/2018 Night Shift Note  Patient remains very confused at intervals and affect remains angry and labile. He becomes easily frustrated when he cannot communicate his needs to staff and then he begins to curse and yell out at staff. Patient also has been taking off his hospital gown and exiting his assigned room with only a rick shirt and underwear. Redirection and reality orientation is provided frequently but it has been unsuccessful or very short-lived. Patient did not sleep at all this shift. He continues to wander and does not understand boundaries and remains very intrusive. No acute distress noted. Q 15 minute checks maintained for safety. Will continue to monitor for safety and reassess patients status as necessary.

## 2018-01-20 NOTE — PROGRESS NOTES
Problem: Altered Thought Process (Adult/Pediatric)  Goal: *STG: Remains safe in hospital  Outcome: Progressing Towards Goal  1/19/2018 Evening Shift Note:  Patient has been visible on the unit this shift. He remains confused and affect remains very labile at times. Patient has been compliant with medication regimen this shift. Patient requires a frequent redirection which is often times unsuccessful or very short lived. Patient received zyprexa earlier during the shift but medication was only slightly effective as patient continues to disrobe at times, curse and yell aloud and is very demanding. No acute distress noted. Q 15 minute checks maintained for safety.  Will continue to monitor for safety and reassess patients status as necessary

## 2018-01-21 PROCEDURE — 74011250637 HC RX REV CODE- 250/637: Performed by: PSYCHIATRY & NEUROLOGY

## 2018-01-21 PROCEDURE — 65220000003 HC RM SEMIPRIVATE PSYCH

## 2018-01-21 RX ORDER — RISPERIDONE 1 MG/ML
1 SOLUTION ORAL DAILY
Status: DISCONTINUED | OUTPATIENT
Start: 2018-01-21 | End: 2018-02-06 | Stop reason: HOSPADM

## 2018-01-21 RX ORDER — RISPERIDONE 1 MG/ML
3 SOLUTION ORAL
Status: DISCONTINUED | OUTPATIENT
Start: 2018-01-21 | End: 2018-02-06 | Stop reason: HOSPADM

## 2018-01-21 RX ADMIN — SENNOSIDES 17.2 MG: 8.6 TABLET, FILM COATED ORAL at 21:27

## 2018-01-21 RX ADMIN — Medication 1 MG: at 12:40

## 2018-01-21 RX ADMIN — Medication 3 MG: at 21:28

## 2018-01-21 RX ADMIN — MIRTAZAPINE 15 MG: 15 TABLET, FILM COATED ORAL at 21:28

## 2018-01-21 NOTE — BH NOTES
Problem: Altered Thought Process (Adult/Pediatric)  Goal: *STG: Remains safe in hospital  Outcome: Progressing Towards Goal  Pt is alert and oriented to self, place. Pt's affect is brighter, mood calmer but remains defiant. Pt refused most of her morning medications. Pt only accepted three of her medications. Pt is eating better and her sleep has improved. Pt is kept safe in the unit. Pt has tried to escape the unit a couple of times.  Will continue to monitor q 15 for safety, mood and behavior changes.

## 2018-01-21 NOTE — PROGRESS NOTES
Problem: Altered Thought Process (Adult/Pediatric)  Goal: *STG: Remains safe in hospital  Outcome: Progressing Towards Goal  Pt is alert but is disoriented x 4. Pt is totally confused, refusing verbal directions and keeping disrobing and exposing himself in the hallway. Pt tends to act aggressive when he is approached by staff. Pt refused all his medications so far this morning. Dr. Lorena Damon will change his Risperidol to liquid form to improve his chance for compliance. Pt is kept safe. Will continue to monitor q 15 for safety, mood and behavior changes.

## 2018-01-21 NOTE — BH NOTES
1/21/2018 Night Shift Note:  Patient has been restless during the night. He slept a total of 3 hours this shift often times coming out of his room inappropriately dressed. He remains very confused and has had episodes of yelling and screaming this shift. Redirection and reality orientation provided frequently but unsucessful or short lived. Q 15 minute checks maintained for safety. Will continue to monitor for safety and reassess patients status as necessary.

## 2018-01-21 NOTE — PROGRESS NOTES
Problem: Altered Thought Process (Adult/Pediatric)  Goal: *STG: Seeks staff when feelings of anxiety and fear arise  Outcome: Progressing Towards Goal  1/20/2018 Evening Shift Nurse Note:  Patient has been visible on the unit this evening and has been medication compliant. Patients affect has not been as labile as on prior day and he has been more approachable this evening. Patient even verbalized to this nurse that he has been very confused. Patient encouraged to vent feelings and emotions to staff. Patient is resting at this time in his room. No complaints voiced and no acute distress noted. Q15 minute checks maintained for safety. Will continue to monitor for safety and reassess patients status as necessary.

## 2018-01-22 PROCEDURE — 65220000003 HC RM SEMIPRIVATE PSYCH

## 2018-01-22 PROCEDURE — 74011250637 HC RX REV CODE- 250/637: Performed by: PSYCHIATRY & NEUROLOGY

## 2018-01-22 RX ADMIN — FINASTERIDE 5 MG: 5 TABLET, FILM COATED ORAL at 07:40

## 2018-01-22 RX ADMIN — ASPIRIN 81 MG: 81 TABLET, CHEWABLE ORAL at 07:40

## 2018-01-22 RX ADMIN — POLYETHYLENE GLYCOL 3350 17 G: 17 POWDER, FOR SOLUTION ORAL at 07:40

## 2018-01-22 RX ADMIN — Medication 1 MG: at 07:40

## 2018-01-22 RX ADMIN — TAMSULOSIN HYDROCHLORIDE 0.4 MG: 0.4 CAPSULE ORAL at 07:40

## 2018-01-22 NOTE — BH NOTES
PSYCHIATRIC PROGRESS NOTE         Patient Name  Abdifatah Jenkins   Date of Birth 1940   [facility] [id number]   Medical Record Number  [id number]      Age  76 y.o. PCP None   Admit date:  1/18/2018    Room Number  321/01  @ [facility]   Date of Service  1/22/2018           E & M PROGRESS NOTE:         HISTORY       CC:  \" confused , disoriented\"  HISTORY OF PRESENT ILLNESS/INTERVAL HISTORY:  (reviewed/updated 1/22/2018). per initial evaluation: The patient, Abdifatah Jenkins, is a 76 y.o. WHITE [place]  male with a past psychiatric history significant for dementia, who presents at this time with complaints of (and/or evidence of) the following emotional symptoms: agitation and memory impairment. Additional symptomatology include poor attention, confused, disoriented to time and place. He is a poor historian, distractible, loud and wandering behavior. The above symptoms have been present since last october. These symptoms are of severe severity. These symptoms are constant  in nature. The patient's condition has been precipitated by and psychosocial stressors ( ). Patient's condition made worse by treatment noncompliance. UDS: +benzo; BAL=0. Abdifatah Jenkins presents/reports/evidences the following emotional symptoms today, 1/22/2018:agitation and paranoid behavior. The above symptoms have been present for few months. These symptoms are of severe severity. The symptoms are constant in nature. Additional symptomatology and features include memory impairment, episodic agitation and poor sleep. Pt has been posturing and threatening staff and received prn med. Wandering behavior but redirectable. 1/21- remains confused , disoriented, disrobing,episodic yelling and screaming, disheveled. Refusing po med  1/22- minimal change in presentation, no aggressive episode. Need prompting and redirection.  Wandering, confused      SIDE EFFECTS: (reviewed/updated 1/22/2018)  None reported or admitted to.  No noted toxicity with use of current med   ALLERGIES:(reviewed/updated 1/22/2018)  Allergies   Allergen Reactions    Erythromycin Hives    Isopropyl Alcohol Hives    Neosporin [Neomycin-Bacitracnzn-Polymyxnb] Rash    Pcn [Penicillins] Rash    Sulfa (Sulfonamide Antibiotics) Rash      MEDICATIONS PRIOR TO ADMISSION:(reviewed/updated 1/22/2018)  Prescriptions Prior to Admission   Medication Sig    risperiDONE (RISPERDAL) 1 mg tablet Take 3 mg by mouth nightly. Indications: Behavioral disturbances associated with dementia    risperiDONE (RISPERDAL) 0.5 mg tablet Take 0.5 mg by mouth two (2) times a day. At 6 AM and 2 PM   Indications: Behavioral disturbances associated with dementia    senna (SENNA) 8.6 mg tablet Take 2 Tabs by mouth nightly. Indications: constipation    triamcinolone acetonide (KENALOG) 0.1 % topical cream Apply  to affected area two (2) times daily as needed for Skin Irritation. use thin layer    aspirin 81 mg chewable tablet Take 81 mg by mouth daily. Indications: myocardial infarction prevention, prevention of cerebrovascular accident    bisacodyl (DULCOLAX, BISACODYL,) 10 mg suppository Insert 10 mg into rectum daily as needed (Constipation).  finasteride (PROSCAR) 5 mg tablet Take 5 mg by mouth daily. Indications: benign prostatic hyperplasia with lower urinary tract sx    ketoconazole (NIZORAL) 2 % shampoo Apply  to affected area daily as needed for Itching. Patient typically uses 2-3 times/week    ondansetron hcl (ZOFRAN, AS HYDROCHLORIDE,) 4 mg tablet Take 4 mg by mouth every eight (8) hours as needed for Nausea.  polyethylene glycol (MIRALAX) 17 gram packet Take 17 g by mouth daily. Indications: constipation    tamsulosin (FLOMAX) 0.4 mg capsule Take 0.4 mg by mouth daily.  Indications: benign prostatic hyperplasia with lower urinary tract sx      PAST MEDICAL HISTORY: Past medical history from the initial psychiatric evaluation has been reviewed (reviewed/updated 1/22/2018) with no additional updates (I asked patient and no additional past medical history provided). Past Medical History:   Diagnosis Date    BPH (benign prostatic hyperplasia)    No past surgical history on file. SOCIAL HISTORY: Social history from the initial psychiatric evaluation has been reviewed (reviewed/updated 1/22/2018) with no additional updates (I asked patient and no additional social history provided). Social History     Social History    Marital status: SINGLE     Spouse name: N/A    Number of children: N/A    Years of education: N/A     Occupational History    Not on file. 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 No family history on file.     REVIEW OF SYSTEMS: (reviewed/updated 1/22/2018)  Appetite:no change from normal   Sleep: does not feel rested and poor with DIMS (difficulty initiating & maintaining sleep)   All other Review of Systems: Psychological ROS: positive for - behavioral disorder, concentration difficulties, disorientation, hostility, memory difficulties and sleep disturbances  Respiratory ROS: no cough, shortness of breath, or wheezing  Cardiovascular ROS: no chest pain or dyspnea on exertion         MENTAL STATUS EXAM & VITALS     MENTAL STATUS EXAM (MSE):    MSE FINDINGS ARE WITHIN NORMAL LIMITS (WNL) UNLESS OTHERWISE STATED BELOW. ( ALL OF THE BELOW CATEGORIES OF THE MSE HAVE BEEN REVIEWED (reviewed 1/22/2018) AND UPDATED AS DEEMED APPROPRIATE )  General Presentation age appropriate and disheveled, uncooperative and unreliable   Orientation confused, Alert and Oriented x 1   Vital Signs  See below (reviewed 1/22/2018); Vital Signs (BP, Pulse, & Temp) are within normal limits if not listed below. Gait and Station Stable/steady, no ataxia   Musculoskeletal System No extrapyramidal symptoms (EPS); no abnormal muscular movements or Tardive Dyskinesia (TD); muscle strength and tone are within normal limits   Language No aphasia or dysarthria   Speech:  monotone and pressured   Thought Processes illogical; slow rate of thoughts; poor abstract reasoning/computation   Thought Associations tangential   Thought Content paranoid delusions, free of hallucinations, confabulation  and internally preoccupied   Suicidal Ideations none   Homicidal Ideations none   Mood:  hostile  and anxious    Affect:  anxious, hostile, increased in intensity and mood-incongruent   Memory recent  impaired   Memory remote:  impaired   Concentration/Attention:  distractable   Fund of Knowledge average   Insight:  poor   Reliability poor   Judgment:  poor          VITALS:     No data found. Wt Readings from Last 3 Encounters:   01/18/18 69.4 kg (153 lb)     Temp Readings from Last 3 Encounters:   01/21/18 97 °F (36.1 °C)     BP Readings from Last 3 Encounters:   01/21/18 118/79     Pulse Readings from Last 3 Encounters:   01/21/18 94            DATA     LABORATORY DATA:(reviewed/updated 1/22/2018)  No results found for this or any previous visit (from the past 24 hour(s)). No results found for: VALF2, VALAC, VALP, VALPR, DS6, CRBAM, CRBAMP, CARB2, XCRBAM  No results found for: LITHM   RADIOLOGY REPORTS:(reviewed/updated 1/22/2018)  No results found.        MEDICATIONS     ALL MEDICATIONS:   Current Facility-Administered Medications   Medication Dose Route Frequency    risperiDONE (RisperDAL) 1 mg/mL oral solution soln 1 mg  1 mg Oral DAILY    risperiDONE (RisperDAL) 1 mg/mL oral solution soln 3 mg  3 mg Oral QHS    mirtazapine (REMERON) tablet 15 mg  15 mg Oral QHS    aspirin chewable tablet 81 mg  81 mg Oral DAILY    bisacodyl (DULCOLAX) suppository 10 mg  10 mg Rectal DAILY PRN    finasteride (PROSCAR) tablet 5 mg  5 mg Oral DAILY    ketoconazole (NIZORAL) 2 % shampoo   Topical DAILY PRN    polyethylene glycol (MIRALAX) packet 17 g  17 g Oral DAILY    senna (SENOKOT) tablet 17.2 mg  2 Tab Oral QHS    tamsulosin (FLOMAX) capsule 0.4 mg  0.4 mg Oral DAILY    triamcinolone acetonide (KENALOG) 0.1 % cream   Topical BID PRN    OLANZapine (ZyPREXA) tablet 2.5 mg  2.5 mg Oral Q6H PRN    ziprasidone (GEODON) 10 mg in sterile water (preservative free) 0.5 mL injection  10 mg IntraMUSCular BID PRN    benztropine (COGENTIN) tablet 1 mg  1 mg Oral BID PRN    benztropine (COGENTIN) injection 1 mg  1 mg IntraMUSCular BID PRN    zolpidem (AMBIEN) tablet 5 mg  5 mg Oral QHS PRN    acetaminophen (TYLENOL) tablet 650 mg  650 mg Oral Q4H PRN    ibuprofen (MOTRIN) tablet 400 mg  400 mg Oral Q8H PRN    magnesium hydroxide (MILK OF MAGNESIA) 400 mg/5 mL oral suspension 30 mL  30 mL Oral DAILY PRN    nicotine (NICODERM CQ) 21 mg/24 hr patch 1 Patch  1 Patch TransDERmal DAILY PRN      SCHEDULED MEDICATIONS:   Current Facility-Administered Medications   Medication Dose Route Frequency    risperiDONE (RisperDAL) 1 mg/mL oral solution soln 1 mg  1 mg Oral DAILY    risperiDONE (RisperDAL) 1 mg/mL oral solution soln 3 mg  3 mg Oral QHS    mirtazapine (REMERON) tablet 15 mg  15 mg Oral QHS    aspirin chewable tablet 81 mg  81 mg Oral DAILY    finasteride (PROSCAR) tablet 5 mg  5 mg Oral DAILY    polyethylene glycol (MIRALAX) packet 17 g  17 g Oral DAILY    senna (SENOKOT) tablet 17.2 mg  2 Tab Oral QHS    tamsulosin (FLOMAX) capsule 0.4 mg  0.4 mg Oral DAILY          ASSESSMENT & PLAN     DIAGNOSES REQUIRING ACTIVE TREATMENT AND MONITORING: (reviewed/updated 1/22/2018)  Patient Active Hospital Problem List:   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 Change to liquid Risperdal- PT consult   Agitation (1/19/2018)    Assessment: severe, no aggression but tendency to get loud, scream and curse staff, need several re direction    Plan: prn med              I will continue to monitor blood levels (Depakote---a drug with a narrow therapeutic index= NTI) and associated labs for drug therapy implemented that require intense monitoring for toxicity as deemed appropriate based on current medication side effects and pharmacodynamically determined drug 1/2 lives. In summary, Janel Uribe, is a 76 y.o.  male who presents with a severe exacerbation of the principal diagnosis of 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  Patient's condition is worsening/not improving/not stable. Patient requires continued inpatient hospitalization for further stabilization, safety monitoring and medication management. I will continue to coordinate the provision of individual, milieu, occupational, group, and substance abuse therapies to address target symptoms/diagnoses as deemed appropriate for the individual patient. A coordinated, multidisplinary treatment team round was conducted with the patient (this team consists of the nurse, psychiatric unit pharmcist,  and writer). Complete current electronic health record for patient has been reviewed today including consultant notes, ancillary staff notes, nurses and psychiatric tech notes. Suicide risk assessment completed and patient deemed to be of low risk for suicide at this time.      The following regarding medications was addressed during rounds with patient:   the risks and benefits of the proposed medication. The patient was given the opportunity to ask questions. Informed consent given to the use of the above medications. Will continue to adjust psychiatric and non-psychiatric medications (see above \"medication\" section and orders section for details) as deemed appropriate & based upon diagnoses and response to treatment. I will continue to order blood tests/labs and diagnostic tests as deemed appropriate and review results as they become available (see orders for details and above listed lab/test results). I will order psychiatric records from previous [facility] hospitals to further elucidate the nature of patient's psychopathology and review once available. I will gather additional collateral information from friends, family and o/p treatment team to further elucidate the nature of patient's psychopathology and baselline level of psychiatric functioning. I certify that this patient's inpatient psychiatric hospital services furnished since the previous certification were, and continue to be, required for treatment that could reasonably be expected to improve the patient's condition, or for diagnostic study, and that the patient continues to need, on a daily basis, active treatment furnished directly by or requiring the supervision of inpatient psychiatric facility personnel. In addition, the hospital records show that services furnished were intensive treatment services, admission or related services, or equivalent services.     EXPECTED DISCHARGE DATE/DAY: TBD     DISPOSITION: Home       Signed By:   Lorena Menchaca MD  1/22/2018

## 2018-01-22 NOTE — PROGRESS NOTES
Problem: Altered Thought Process (Adult/Pediatric)  Goal: *STG: Remains safe in hospital  Outcome: Progressing Towards Goal  Pt slept 1 hour. He required redirection on a few occasions. He was disoriented and tried to walk into nursing station. No PRN's were given. Continuing to monitor with q15 min rounds for safety.

## 2018-01-22 NOTE — PROGRESS NOTES
Diet as tolerated. Meal compliance labile at best.  Hx notable for dementia.   Ht: 5'10\"  Wt: 153 lb  BMI: 21.95 kg/(m^2) c/w normal weight  Est energy needs: 1785 kcal, 65 g protein, 1 mL/kcal fluids  Pt will consume > 75% of meals at follow up

## 2018-01-22 NOTE — BH NOTES
PSYCHIATRIC PROGRESS NOTE         Patient Name  Kian Baca   Date of Birth 1940   [facility] [id number]   Medical Record Number  [id number]      Age  76 y.o. PCP None   Admit date:  1/18/2018    Room Number  321/01  @ [facility]   Date of Service  1/21/2018           E & M PROGRESS NOTE:         HISTORY       CC:  \" confused , disoriented\"  HISTORY OF PRESENT ILLNESS/INTERVAL HISTORY:  (reviewed/updated 1/21/2018). per initial evaluation: The patient, Kian Baca, is a 76 y.o. WHITE [place]  male with a past psychiatric history significant for dementia, who presents at this time with complaints of (and/or evidence of) the following emotional symptoms: agitation and memory impairment. Additional symptomatology include poor attention, confused, disoriented to time and place. He is a poor historian, distractible, loud and wandering behavior. The above symptoms have been present since last october. These symptoms are of severe severity. These symptoms are constant  in nature. The patient's condition has been precipitated by and psychosocial stressors ( ). Patient's condition made worse by treatment noncompliance. UDS: +benzo; BAL=0. Kian Baca presents/reports/evidences the following emotional symptoms today, 1/21/2018:agitation and paranoid behavior. The above symptoms have been present for few months. These symptoms are of severe severity. The symptoms are constant in nature. Additional symptomatology and features include memory impairment, episodic agitation and poor sleep. Pt has been posturing and threatening staff and received prn med. Wandering behavior but redirectable. 1/21- remains confused , disoriented, disrobing,episodic yelling and screaming, disheveled. Refusing po med      SIDE EFFECTS: (reviewed/updated 1/21/2018)  None reported or admitted to.   No noted toxicity with use of current med   ALLERGIES:(reviewed/updated 1/21/2018)  Allergies   Allergen Reactions  Erythromycin Hives    Isopropyl Alcohol Hives    Neosporin [Neomycin-Bacitracnzn-Polymyxnb] Rash    Pcn [Penicillins] Rash    Sulfa (Sulfonamide Antibiotics) Rash      MEDICATIONS PRIOR TO ADMISSION:(reviewed/updated 1/21/2018)  Prescriptions Prior to Admission   Medication Sig    risperiDONE (RISPERDAL) 1 mg tablet Take 3 mg by mouth nightly. Indications: Behavioral disturbances associated with dementia    risperiDONE (RISPERDAL) 0.5 mg tablet Take 0.5 mg by mouth two (2) times a day. At 6 AM and 2 PM   Indications: Behavioral disturbances associated with dementia    senna (SENNA) 8.6 mg tablet Take 2 Tabs by mouth nightly. Indications: constipation    triamcinolone acetonide (KENALOG) 0.1 % topical cream Apply  to affected area two (2) times daily as needed for Skin Irritation. use thin layer    aspirin 81 mg chewable tablet Take 81 mg by mouth daily. Indications: myocardial infarction prevention, prevention of cerebrovascular accident    bisacodyl (DULCOLAX, BISACODYL,) 10 mg suppository Insert 10 mg into rectum daily as needed (Constipation).  finasteride (PROSCAR) 5 mg tablet Take 5 mg by mouth daily. Indications: benign prostatic hyperplasia with lower urinary tract sx    ketoconazole (NIZORAL) 2 % shampoo Apply  to affected area daily as needed for Itching. Patient typically uses 2-3 times/week    ondansetron hcl (ZOFRAN, AS HYDROCHLORIDE,) 4 mg tablet Take 4 mg by mouth every eight (8) hours as needed for Nausea.  polyethylene glycol (MIRALAX) 17 gram packet Take 17 g by mouth daily. Indications: constipation    tamsulosin (FLOMAX) 0.4 mg capsule Take 0.4 mg by mouth daily. Indications: benign prostatic hyperplasia with lower urinary tract sx      PAST MEDICAL HISTORY: Past medical history from the initial psychiatric evaluation has been reviewed (reviewed/updated 1/21/2018) with no additional updates (I asked patient and no additional past medical history provided).    Past Medical History:   Diagnosis Date    BPH (benign prostatic hyperplasia)    No past surgical history on file. SOCIAL HISTORY: Social history from the initial psychiatric evaluation has been reviewed (reviewed/updated 1/21/2018) with no additional updates (I asked patient and no additional social history provided). Social History     Social History    Marital status: SINGLE     Spouse name: N/A    Number of children: N/A    Years of education: N/A     Occupational History    Not on file. 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      FAMILY HISTORY: Family history from the initial psychiatric evaluation has been reviewed (reviewed/updated 1/21/2018) with no additional updates (I asked patient and no additional family history provided). No family history on file.     REVIEW OF SYSTEMS: (reviewed/updated 1/21/2018)  Appetite:no change from normal   Sleep: does not feel rested and poor with DIMS (difficulty initiating & maintaining sleep)   All other Review of Systems: Psychological ROS: positive for - behavioral disorder, concentration difficulties, disorientation, hostility, memory difficulties and sleep disturbances  Respiratory ROS: no cough, shortness of breath, or wheezing  Cardiovascular ROS: no chest pain or dyspnea on exertion         [facility] (MSE):    MSE FINDINGS ARE WITHIN NORMAL LIMITS (WNL) UNLESS OTHERWISE STATED BELOW. ( ALL OF THE BELOW CATEGORIES OF THE MSE HAVE BEEN REVIEWED (reviewed 1/21/2018) AND UPDATED AS DEEMED APPROPRIATE )  General Presentation age appropriate and disheveled, uncooperative and unreliable   Orientation confused, Alert and Oriented x 1   Vital Signs  See below (reviewed 1/21/2018); Vital Signs (BP, Pulse, & Temp) are within normal limits if not listed below. Gait and Station Stable/steady, no ataxia   Musculoskeletal System No extrapyramidal symptoms (EPS); no abnormal muscular movements or Tardive Dyskinesia (TD); muscle strength and tone are within normal limits   Language No aphasia or dysarthria   Speech:  monotone and pressured   Thought Processes illogical; slow rate of thoughts; poor abstract reasoning/computation   Thought Associations tangential   Thought Content paranoid delusions, free of hallucinations, confabulation  and internally preoccupied   Suicidal Ideations none   Homicidal Ideations none   Mood:  hostile  and anxious    Affect:  anxious, hostile, increased in intensity and mood-incongruent   Memory recent  impaired   Memory remote:  impaired   Concentration/Attention:  distractable   Fund of Knowledge average   Insight:  poor   Reliability poor   Judgment:  poor          VITALS:     Patient Vitals for the past 24 hrs:   Temp Pulse Resp BP   01/21/18 0614 97 °F (36.1 °C) 94 18 118/79     Wt Readings from Last 3 Encounters:   01/18/18 69.4 kg (153 lb)     Temp Readings from Last 3 Encounters:   01/21/18 97 °F (36.1 °C)     BP Readings from Last 3 Encounters:   01/21/18 118/79     Pulse Readings from Last 3 Encounters:   01/21/18 94            DATA     LABORATORY DATA:(reviewed/updated 1/21/2018)  No results found for this or any previous visit (from the past 24 hour(s)). No results found for: VALF2, VALAC, VALP, VALPR, DS6, CRBAM, CRBAMP, CARB2, XCRBAM  No results found for: LITHM   RADIOLOGY REPORTS:(reviewed/updated 1/21/2018)  No results found.        MEDICATIONS     ALL MEDICATIONS:   Current Facility-Administered Medications   Medication Dose Route Frequency    risperiDONE (RisperDAL) 1 mg/mL oral solution soln 1 mg  1 mg Oral DAILY    risperiDONE (RisperDAL) 1 mg/mL oral solution soln 3 mg  3 mg Oral QHS    mirtazapine (REMERON) tablet 15 mg  15 mg Oral QHS    aspirin chewable tablet 81 mg  81 mg Oral DAILY    bisacodyl (DULCOLAX) suppository 10 mg  10 mg Rectal DAILY PRN    finasteride (PROSCAR) tablet 5 mg  5 mg Oral DAILY    ketoconazole (NIZORAL) 2 % shampoo   Topical DAILY PRN    polyethylene glycol (MIRALAX) packet 17 g  17 g Oral DAILY    senna (SENOKOT) tablet 17.2 mg  2 Tab Oral QHS    tamsulosin (FLOMAX) capsule 0.4 mg  0.4 mg Oral DAILY    triamcinolone acetonide (KENALOG) 0.1 % cream   Topical BID PRN    OLANZapine (ZyPREXA) tablet 2.5 mg  2.5 mg Oral Q6H PRN    ziprasidone (GEODON) 10 mg in sterile water (preservative free) 0.5 mL injection  10 mg IntraMUSCular BID PRN    benztropine (COGENTIN) tablet 1 mg  1 mg Oral BID PRN    benztropine (COGENTIN) injection 1 mg  1 mg IntraMUSCular BID PRN    zolpidem (AMBIEN) tablet 5 mg  5 mg Oral QHS PRN    acetaminophen (TYLENOL) tablet 650 mg  650 mg Oral Q4H PRN    ibuprofen (MOTRIN) tablet 400 mg  400 mg Oral Q8H PRN    magnesium hydroxide (MILK OF MAGNESIA) 400 mg/5 mL oral suspension 30 mL  30 mL Oral DAILY PRN    nicotine (NICODERM CQ) 21 mg/24 hr patch 1 Patch  1 Patch TransDERmal DAILY PRN      SCHEDULED MEDICATIONS:   Current Facility-Administered Medications   Medication Dose Route Frequency    risperiDONE (RisperDAL) 1 mg/mL oral solution soln 1 mg  1 mg Oral DAILY    risperiDONE (RisperDAL) 1 mg/mL oral solution soln 3 mg  3 mg Oral QHS    mirtazapine (REMERON) tablet 15 mg  15 mg Oral QHS    aspirin chewable tablet 81 mg  81 mg Oral DAILY    finasteride (PROSCAR) tablet 5 mg  5 mg Oral DAILY    polyethylene glycol (MIRALAX) packet 17 g  17 g Oral DAILY    senna (SENOKOT) tablet 17.2 mg  2 Tab Oral QHS    tamsulosin (FLOMAX) capsule 0.4 mg  0.4 mg Oral DAILY          ASSESSMENT & PLAN     DIAGNOSES REQUIRING ACTIVE TREATMENT AND MONITORING: (reviewed/updated 1/21/2018)  Patient Active Hospital Problem List:   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 (1/19/2018)    Assessment: severe, poor memory, agitation, threatening behavior towards staff, recently dc from [facility] in dec and recently worsening of sx. Plan: will ct with Risperdal add Remeron. Change to liquid Risperdal   Agitation (1/19/2018)    Assessment: severe, no aggression but tendency to get loud, scream and curse staff, need several re direction    Plan: prn med              I will continue to monitor blood levels (Depakote---a drug with a narrow therapeutic index= NTI) and associated labs for drug therapy implemented that require intense monitoring for toxicity as deemed appropriate based on current medication side effects and pharmacodynamically determined drug 1/2 lives. In summary, Cristo Hoffman, is a 76 y.o.  male who presents with a severe exacerbation of the principal diagnosis of 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  Patient's condition is worsening/not improving/not stable. Patient requires continued inpatient hospitalization for further stabilization, safety monitoring and medication management. I will continue to coordinate the provision of individual, milieu, occupational, group, and substance abuse therapies to address target symptoms/diagnoses as deemed appropriate for the individual patient. A coordinated, multidisplinary treatment team round was conducted with the patient (this team consists of the nurse, psychiatric unit pharmcist,  and writer). Complete current electronic health record for patient has been reviewed today including consultant notes, ancillary staff notes, nurses and psychiatric tech notes. Suicide risk assessment completed and patient deemed to be of low risk for suicide at this time. The following regarding medications was addressed during rounds with patient:   the risks and benefits of the proposed medication.  The patient was given the opportunity to ask questions. Informed consent given to the use of the above medications. Will continue to adjust psychiatric and non-psychiatric medications (see above \"medication\" section and orders section for details) as deemed appropriate & based upon diagnoses and response to treatment. I will continue to order blood tests/labs and diagnostic tests as deemed appropriate and review results as they become available (see orders for details and above listed lab/test results). I will order psychiatric records from previous [facility] hospitals to further elucidate the nature of patient's psychopathology and review once available. I will gather additional collateral information from friends, family and o/p treatment team to further elucidate the nature of patient's psychopathology and baselline level of psychiatric functioning. I certify that this patient's inpatient psychiatric hospital services furnished since the previous certification were, and continue to be, required for treatment that could reasonably be expected to improve the patient's condition, or for diagnostic study, and that the patient continues to need, on a daily basis, active treatment furnished directly by or requiring the supervision of inpatient psychiatric facility personnel. In addition, the hospital records show that services furnished were intensive treatment services, admission or related services, or equivalent services.     EXPECTED DISCHARGE DATE/DAY: TBD     DISPOSITION: Home       Signed By:   Shahriar Salas MD  1/21/2018

## 2018-01-22 NOTE — BH NOTES
Social Work     Clinician talked with the pt's son Barney Holden ([address] [facility] guardian ) and he gave permission  that talking with the pt's caregiver Clementine Shaffer [phone] was acceptable. Mr. Mirella Ren has cared for the pt for the past 3 years. He noticed a decline in the pt's condition in the last month. The outpatient provider had been adjusting the medications and he stated they couldn't find the right doses. He will come to visit with the pt mitesh.

## 2018-01-22 NOTE — BH NOTES
Problem: Altered Thought Process (Adult/Pediatric)  Goal: *STG: Remains safe in hospital  Outcome: Progressing Towards Goal  Pt remains disoriented x 4. and totally confused and very frustrated at his own confusions. Pt is ambulating in the unit slowly using his walker with staff supervision to come to the dayroom to eat. Pt is kept safe.  Will continue to monitor q 15 for safety, mood and behavior changes.

## 2018-01-22 NOTE — BH NOTES
GROUP THERAPY PROGRESS NOTE    The patient Joellen Ward is participating in Reflections Group.      Group time: 30 minutes    Personal goal for participation: Group discussion of daily goals of the day    Goal orientation: personal    Group therapy participation: active    Therapeutic interventions reviewed and discussed: Yes    Impression of participation:     Genna Spine  1/22/2018  10:21 AM

## 2018-01-22 NOTE — BH NOTES
GROUP THERAPY PROGRESS NOTE    The patient Agusto ascencio 76 y.o. male is participating in Creative Expression Group. Group time: 1 hour    Personal goal for participation:  To concentrate on selected task    Goal orientation: social    Group therapy participation: active    Therapeutic interventions reviewed and discussed: Crafts, games, music    Impression of participation: The patient was attentive-required prompting    Jen Rodriguez  1/22/2018  5:18 PM

## 2018-01-22 NOTE — BH NOTES
Pt was redirected a couple times this morning. Pt was confused on where is room and bathroom was staff redirected him. Pt ate all of his breakfast. Observed pt receiving his medication from nurse. Pt went to take a nap after breakfast. Pt rested for over 30 minutes. Pt came to dayroom to watch television then left to lay down. Will continue to was and observe pt q15.

## 2018-01-23 PROCEDURE — 74011250637 HC RX REV CODE- 250/637: Performed by: PSYCHIATRY & NEUROLOGY

## 2018-01-23 PROCEDURE — 97116 GAIT TRAINING THERAPY: CPT

## 2018-01-23 PROCEDURE — G8979 MOBILITY GOAL STATUS: HCPCS

## 2018-01-23 PROCEDURE — G8978 MOBILITY CURRENT STATUS: HCPCS

## 2018-01-23 PROCEDURE — 65220000003 HC RM SEMIPRIVATE PSYCH

## 2018-01-23 PROCEDURE — 97161 PT EVAL LOW COMPLEX 20 MIN: CPT

## 2018-01-23 RX ORDER — MEMANTINE HYDROCHLORIDE 10 MG/1
5 TABLET ORAL 2 TIMES DAILY
Status: DISCONTINUED | OUTPATIENT
Start: 2018-01-23 | End: 2018-01-24

## 2018-01-23 RX ADMIN — FINASTERIDE 5 MG: 5 TABLET, FILM COATED ORAL at 08:54

## 2018-01-23 RX ADMIN — SENNOSIDES 17.2 MG: 8.6 TABLET, FILM COATED ORAL at 21:07

## 2018-01-23 RX ADMIN — Medication 3 MG: at 21:09

## 2018-01-23 RX ADMIN — ZOLPIDEM TARTRATE 5 MG: 5 TABLET, FILM COATED ORAL at 01:26

## 2018-01-23 RX ADMIN — ASPIRIN 81 MG: 81 TABLET, CHEWABLE ORAL at 08:54

## 2018-01-23 RX ADMIN — POLYETHYLENE GLYCOL 3350 17 G: 17 POWDER, FOR SOLUTION ORAL at 08:55

## 2018-01-23 RX ADMIN — TAMSULOSIN HYDROCHLORIDE 0.4 MG: 0.4 CAPSULE ORAL at 08:54

## 2018-01-23 RX ADMIN — Medication 1 MG: at 08:54

## 2018-01-23 RX ADMIN — MIRTAZAPINE 15 MG: 15 TABLET, FILM COATED ORAL at 21:07

## 2018-01-23 RX ADMIN — MEMANTINE 5 MG: 10 TABLET ORAL at 21:08

## 2018-01-23 NOTE — BH NOTES
Restless all night in & out of room several times during the night without pants upset when redirected.

## 2018-01-23 NOTE — BH NOTES
Pt was received up, alert, and visible on unit when staff arrived. Pt is meal and med compliant. Pt continues to have random outbursts and requires redirection. Will continue to monitor pt Q 15 min checks for safety and support.

## 2018-01-23 NOTE — PROGRESS NOTES
Problem: Mobility Impaired (Adult and Pediatric)  Goal: *Acute Goals and Plan of Care (Insert Text)  Physical Therapy Goals  Initiated 1/23/2018  1. Patient will move from supine to sit and sit to supine , scoot up and down and roll side to side in bed with independence within 7 day(s). 2.  Patient will transfer from bed to chair and chair to bed with independence using the least restrictive device within 7 day(s). 3.  Patient will perform sit to stand with independence within 7 day(s). 4.  Patient will ambulate with modified independence for 300 feet with the least restrictive device within 7 day(s). physical Therapy EVALUATION  Seen 0850 to 0915  Patient: Katy Ndiaye (89 y.o. male)  Date: 1/23/2018  Primary Diagnosis: Alzheimers Disease  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        Precautions:   Fall    ASSESSMENT :  Based on the objective data described below, the patient presents with decreased balance and functional mobility. Today he ambulated about 125' with rollator, SBA. He was seen ambulating w/o the walker as well. .  Pt ambulates with a flexed posture and shuffling gait pattern. With vc's, he was able to lengthen his step but there was little carryover. Encouraged pt to utilize the rolling walker, improved safety. Pt wandered into other pt's room looking for his \"lab\", easily redirected. Suspect pt is close to his mobility baseline, will follow for a couple of visits to assure safety. He will need 24/7 supervision and assistance at discharge. Patient will benefit from skilled intervention to address the above impairments.   Patients rehabilitation potential is considered to be Good  Factors which may influence rehabilitation potential include:   []         None noted  [x]         Mental ability/status  []         Medical condition  [x]         Home/family situation and support systems  []         Safety awareness  []         Pain tolerance/management  [] Other: PLAN :  Recommendations and Planned Interventions:  []           Bed Mobility Training             []    Neuromuscular Re-Education  [x]           Transfer Training                   []    Orthotic/Prosthetic Training  [x]           Gait Training                         []    Modalities  [x]           Therapeutic Exercises           []    Edema Management/Control  [x]           Therapeutic Activities            [x]    Patient and Family Training/Education  []           Other (comment):    Frequency/Duration: Patient will be followed by physical therapy 1 to  2 times to address goals. Discharge Recommendations: To Be Determined  Further Equipment Recommendations for Discharge: none     SUBJECTIVE:   Patient stated I'll go in a few minutes.     OBJECTIVE DATA SUMMARY:   HISTORY:    Past Medical History:   Diagnosis Date    BPH (benign prostatic hyperplasia)    No past surgical history on file. Prior Level of Function/Home Situation: Unclear  Personal factors and/or comorbidities impacting plan of care:     Home Situation  Home Environment: Independent living  One/Two Story Residence: One story  Living Alone: No  Support Systems: Family member(s)  Patient Expects to be Discharged to[de-identified] Unknown  Current DME Used/Available at Home: Walker, rollator    EXAMINATION/PRESENTATION/DECISION MAKING:   Critical Behavior:  Neurologic State: Alert, Confused  Orientation Level: Disoriented X4  Hearing: Auditory  Auditory Impairment: None    Range Of Motion:  AROM: Generally decreased, functional  Strength:    Strength: Within functional limits  Functional Mobility:  Bed Mobility:  Not tested  Transfers:  Sit to Stand: Supervision  Stand to Sit: Supervision  Balance:   Sitting: Intact  Standing: Intact; With support  Ambulation/Gait Training:  Distance (ft): 125 Feet (ft)  Assistive Device: Walker, rollator  Ambulation - Level of Assistance: Stand-by asssistance   Gait Abnormalities: Decreased step clearance;Shuffling gait  Base of Support: Narrowed  Speed/Bety: Shuffled  Step Length: Right shortened;Left shortened    Therapeutic Exercises:   Seated shoulder squeezes, neck extension    Functional Measure:  Barthel Index:    Bathin  Bladder: 10  Bowels: 10  Groomin  Dressin  Feeding: 10  Mobility: 15  Stairs: 5  Toilet Use: 5  Transfer (Bed to Chair and Back): 10  Total: 75       Barthel and G-code impairment scale:  Percentage of impairment CH  0% CI  1-19% CJ  20-39% CK  40-59% CL  60-79% CM  80-99% CN  100%   Barthel Score 0-100 100 99-80 79-60 59-40 20-39 1-19   0   Barthel Score 0-20 20 17-19 13-16 9-12 5-8 1-4 0      The Barthel ADL Index: Guidelines  1. The index should be used as a record of what a patient does, not as a record of what a patient could do. 2. The main aim is to establish degree of independence from any help, physical or verbal, however minor and for whatever reason. 3. The need for supervision renders the patient not independent. 4. A patient's performance should be established using the best available evidence. Asking the patient, friends/relatives and nurses are the usual sources, but direct observation and common sense are also important. However direct testing is not needed. 5. Usually the patient's performance over the preceding 24-48 hours is important, but occasionally longer periods will be relevant. 6. Middle categories imply that the patient supplies over 50 per cent of the effort. 7. Use of aids to be independent is allowed. Irina Dillon., Barthel, D.W. ([id number]). Functional evaluation: the Barthel Index. [address] [facility] (14)2. JUVENAL Hall, Malcolm Martinez., Jorge L Cerda., Jessica, [address] [place] [address] (). Measuring the change indisability after inpatient rehabilitation; comparison of the responsiveness of the Barthel Index and Functional [place] Measure. Journal of Neurology, Neurosurgery, and Psychiatry, 66(4), 866-372.   NORRIS Mann, ANGELLA Khan, & Kenia Sharpe M.A. (2004.) Assessment of post-stroke quality of life in cost-effectiveness studies: The usefulness of the Barthel Index and the EuroQoL-5D. Quality of Life Research, 13, 414-04       G codes: In compliance with CMSs Claims Based Outcome Reporting, the following G-code set was chosen for this patient based on their primary functional limitation being treated: The outcome measure chosen to determine the severity of the functional limitation was the arthel with a score of 75/100 which was correlated with the impairment scale. ? Mobility - Walking and Moving Around:     - CURRENT STATUS: CJ - 20%-39% impaired, limited or restricted    - GOAL STATUS: CI - 1%-19% impaired, limited or restricted    - D/C STATUS:  ---------------To be determined---------------      Physical Therapy Evaluation Charge Determination   History Examination Presentation Decision-Making   MEDIUM  Complexity : 1-2 comorbidities / personal factors will impact the outcome/ POC  LOW Complexity : 1-2 Standardized tests and measures addressing body structure, function, activity limitation and / or participation in recreation  LOW Complexity : Stable, uncomplicated  LOW Complexity : FOTO score of       Based on the above components, the patient evaluation is determined to be of the following complexity level: LOW   Activity Tolerance: Tolerated well  Please refer to the flowsheet for vital signs taken during this treatment. After treatment:   [x]         Patient left in no apparent distress sitting up in chair  []         Patient left in no apparent distress in bed  []         Call bell left within reach  [x]         Nursing notified  [x]         Caregiver present  []         Bed alarm activated    COMMUNICATION/EDUCATION:   The patients plan of care was discussed with: Registered Nurse. [x]         Fall prevention education was provided and the patient/caregiver indicated understanding.   [x] Patient/family have participated as able in goal setting and plan of care. [x]         Patient/family agree to work toward stated goals and plan of care. []         Patient understands intent and goals of therapy, but is neutral about his/her participation. []         Patient is unable to participate in goal setting and plan of care.     Thank you for this referral.  Jeff Rowe, PT

## 2018-01-23 NOTE — PROGRESS NOTES
Problem: Suicide/Homicide (Adult/Pediatric)  Goal: *STG: Attends activities and groups  [address] [facility] [address]  Master Treatment Plan for Umberto Chappell    Date Treatment Plan Initiated:1/23/2018     Treatment Plan Modalities:  Type of Modality Amount  (x minutes) Frequency (x/week) Duration (x days) Name of Responsible Staff   Community & wrap-up meetings to encourage peer interactions 30 5 5   SARAH Grider   Group psychotherapy to assist in building coping skills and internal controls       Therapeutic activity groups to build coping skills 60 5 5 Tammi Whitlock   Psychoeducation in group setting to address:   Medication education   30 2 2 [address] skills         Relaxation techniques         Symptom management         Discharge planning         Spirituality          DEJAN         Recovery/AA/NA         Physician medication management         Family meeting/discharge planning
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
## 2018-01-23 NOTE — PROGRESS NOTES
Problem: Altered Thought Process (Adult/Pediatric)  Goal: *STG: Remains safe in hospital  Outcome: Progressing Towards Goal  Patient received resting in bed. Refused dinner stating he did not feel well. Offered ginger ale and crackers to patient and patient stated they did help. Remains in room, did not attend group. Encouraged patient to stay out of bed and walk however patient states his stomach is upset. Staff will continue safety checks Q 15 minutes. Vital signs stable.

## 2018-01-24 PROCEDURE — 65220000003 HC RM SEMIPRIVATE PSYCH

## 2018-01-24 PROCEDURE — 74011250637 HC RX REV CODE- 250/637: Performed by: PSYCHIATRY & NEUROLOGY

## 2018-01-24 RX ORDER — MEMANTINE HYDROCHLORIDE 10 MG/1
10 TABLET ORAL 2 TIMES DAILY
Status: DISCONTINUED | OUTPATIENT
Start: 2018-01-24 | End: 2018-02-06 | Stop reason: HOSPADM

## 2018-01-24 RX ADMIN — MEMANTINE 5 MG: 10 TABLET ORAL at 08:26

## 2018-01-24 RX ADMIN — Medication 1 MG: at 08:26

## 2018-01-24 RX ADMIN — ASPIRIN 81 MG: 81 TABLET, CHEWABLE ORAL at 08:26

## 2018-01-24 RX ADMIN — SENNOSIDES 17.2 MG: 8.6 TABLET, FILM COATED ORAL at 23:28

## 2018-01-24 RX ADMIN — ZOLPIDEM TARTRATE 5 MG: 5 TABLET, FILM COATED ORAL at 00:57

## 2018-01-24 RX ADMIN — MEMANTINE 10 MG: 10 TABLET ORAL at 17:35

## 2018-01-24 RX ADMIN — FINASTERIDE 5 MG: 5 TABLET, FILM COATED ORAL at 08:26

## 2018-01-24 RX ADMIN — TAMSULOSIN HYDROCHLORIDE 0.4 MG: 0.4 CAPSULE ORAL at 08:26

## 2018-01-24 RX ADMIN — MIRTAZAPINE 15 MG: 15 TABLET, FILM COATED ORAL at 23:28

## 2018-01-24 RX ADMIN — Medication 3 MG: at 23:28

## 2018-01-24 RX ADMIN — POLYETHYLENE GLYCOL 3350 17 G: 17 POWDER, FOR SOLUTION ORAL at 08:27

## 2018-01-24 NOTE — PROGRESS NOTES
Problem: Altered Thought Process (Adult/Pediatric)  Goal: *STG: Remains safe in hospital  Outcome: Progressing Towards Goal  Patient resting in bed, came to day room for dinner. Patient did not attend groups. Visitors in to see patient and patient walked to day room to visit. Patient shuffles his feet but when asked to take longer steps, patient tries very hard and does well. Patient has been medication and meal compliant this evening. No loud outbursts noted this evening. Staff will continue safety checks Q 15 minutes, and offer support and assistance when needed. 2200: Patient would not take his scheduled 2200 medication. Remains sad,depressed, has a flat, sad affect.

## 2018-01-24 NOTE — BH NOTES
PSYCHIATRIC PROGRESS NOTE         Patient Name  Yuri Gallo   Date of Birth 1940   [facility] [id number]   Medical Record Number  [id number]      Age  76 y.o. PCP None   Admit date:  1/18/2018    Room Number  321/01  @ [facility]   Date of Service  1/23/2018           E & M PROGRESS NOTE:         HISTORY       CC:  \" confused , disoriented\"  HISTORY OF PRESENT ILLNESS/INTERVAL HISTORY:  (reviewed/updated 1/23/2018). per initial evaluation: The patient, Yuri Gallo, is a 76 y.o. WHITE [place]  male with a past psychiatric history significant for dementia, who presents at this time with complaints of (and/or evidence of) the following emotional symptoms: agitation and memory impairment. Additional symptomatology include poor attention, confused, disoriented to time and place. He is a poor historian, distractible, loud and wandering behavior. The above symptoms have been present since last october. These symptoms are of severe severity. These symptoms are constant  in nature. The patient's condition has been precipitated by and psychosocial stressors ( ). Patient's condition made worse by treatment noncompliance. UDS: +benzo; BAL=0. Yuri Gallo presents/reports/evidences the following emotional symptoms today, 1/23/2018:agitation and paranoid behavior. The above symptoms have been present for few months. These symptoms are of severe severity. The symptoms are constant in nature. Additional symptomatology and features include memory impairment, episodic agitation and poor sleep. Pt has been posturing and threatening staff and received prn med. Wandering behavior but redirectable. 1/21- remains confused , disoriented, disrobing,episodic yelling and screaming, disheveled. Refusing po med  1/22- minimal change in presentation, no aggressive episode. Need prompting and redirection. Wandering, confused  1/23- episodic of screaming, yelling outburst. Pt is redirectable .  Confused and disoriented. Sleep remains poor      SIDE EFFECTS: (reviewed/updated 1/23/2018)  None reported or admitted to. No noted toxicity with use of current med   ALLERGIES:(reviewed/updated 1/23/2018)  Allergies   Allergen Reactions    Erythromycin Hives    Isopropyl Alcohol Hives    Neosporin [Neomycin-Bacitracnzn-Polymyxnb] Rash    Pcn [Penicillins] Rash    Sulfa (Sulfonamide Antibiotics) Rash      MEDICATIONS PRIOR TO ADMISSION:(reviewed/updated 1/23/2018)  Prescriptions Prior to Admission   Medication Sig    risperiDONE (RISPERDAL) 1 mg tablet Take 3 mg by mouth nightly. Indications: Behavioral disturbances associated with dementia    risperiDONE (RISPERDAL) 0.5 mg tablet Take 0.5 mg by mouth two (2) times a day. At 6 AM and 2 PM   Indications: Behavioral disturbances associated with dementia    senna (SENNA) 8.6 mg tablet Take 2 Tabs by mouth nightly. Indications: constipation    triamcinolone acetonide (KENALOG) 0.1 % topical cream Apply  to affected area two (2) times daily as needed for Skin Irritation. use thin layer    aspirin 81 mg chewable tablet Take 81 mg by mouth daily. Indications: myocardial infarction prevention, prevention of cerebrovascular accident    bisacodyl (DULCOLAX, BISACODYL,) 10 mg suppository Insert 10 mg into rectum daily as needed (Constipation).  finasteride (PROSCAR) 5 mg tablet Take 5 mg by mouth daily. Indications: benign prostatic hyperplasia with lower urinary tract sx    ketoconazole (NIZORAL) 2 % shampoo Apply  to affected area daily as needed for Itching. Patient typically uses 2-3 times/week    ondansetron hcl (ZOFRAN, AS HYDROCHLORIDE,) 4 mg tablet Take 4 mg by mouth every eight (8) hours as needed for Nausea.  polyethylene glycol (MIRALAX) 17 gram packet Take 17 g by mouth daily. Indications: constipation    tamsulosin (FLOMAX) 0.4 mg capsule Take 0.4 mg by mouth daily.  Indications: benign prostatic hyperplasia with lower urinary tract sx      PAST MEDICAL HISTORY: Past medical history from the initial psychiatric evaluation has been reviewed (reviewed/updated 1/23/2018) with no additional updates (I asked patient and no additional past medical history provided). Past Medical History:   Diagnosis Date    BPH (benign prostatic hyperplasia)    No past surgical history on file. SOCIAL HISTORY: Social history from the initial psychiatric evaluation has been reviewed (reviewed/updated 1/23/2018) with no additional updates (I asked patient and no additional social history provided). Social History     Social History    Marital status: SINGLE     Spouse name: N/A    Number of children: N/A    Years of education: N/A     Occupational History    Not on file. 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 No family history on file.     REVIEW OF SYSTEMS: (reviewed/updated 1/23/2018)  Appetite:no change from normal   Sleep: does not feel rested and poor with DIMS (difficulty initiating & maintaining sleep)   All other Review of Systems: Psychological ROS: positive for - behavioral disorder, concentration difficulties, disorientation, hostility, memory difficulties and sleep disturbances  Respiratory ROS: no cough, shortness of breath, or wheezing  Cardiovascular ROS: no chest pain or dyspnea on exertion         MENTAL STATUS EXAM & VITALS     MENTAL STATUS EXAM (MSE):    MSE FINDINGS ARE WITHIN NORMAL LIMITS (WNL) UNLESS OTHERWISE STATED BELOW. ( ALL OF THE BELOW CATEGORIES OF THE MSE HAVE BEEN REVIEWED (reviewed 1/23/2018) AND UPDATED AS DEEMED APPROPRIATE )  General Presentation age appropriate and disheveled, uncooperative and unreliable   Orientation confused, Alert and Oriented x 1   Vital Signs  See below (reviewed 1/23/2018); Vital Signs (BP, Pulse, & Temp) are within normal limits if not listed below. Gait and Station Stable/steady, no ataxia   Musculoskeletal System No extrapyramidal symptoms (EPS); no abnormal muscular movements or Tardive Dyskinesia (TD); muscle strength and tone are within normal limits   Language No aphasia or dysarthria   Speech:  monotone and pressured   Thought Processes illogical; slow rate of thoughts; poor abstract reasoning/computation   Thought Associations tangential   Thought Content paranoid delusions, free of hallucinations, confabulation  and internally preoccupied   Suicidal Ideations none   Homicidal Ideations none   Mood:  hostile  and anxious    Affect:  anxious, hostile, increased in intensity and mood-incongruent   Memory recent  impaired   Memory remote:  impaired   Concentration/Attention:  distractable   Fund of Knowledge average   Insight:  poor   Reliability poor   Judgment:  poor          VITALS:     Patient Vitals for the past 24 hrs:   Temp Pulse Resp BP   01/23/18 0537 95.8 °F (35.4 °C) (!) 102 18 125/80     Wt Readings from Last 3 Encounters:   01/18/18 69.4 kg (153 lb)     Temp Readings from Last 3 Encounters:   01/23/18 95.8 °F (35.4 °C)     BP Readings from Last 3 Encounters:   01/23/18 125/80     Pulse Readings from Last 3 Encounters:   01/23/18 (!) 102            DATA     LABORATORY DATA:(reviewed/updated 1/23/2018)  No results found for this or any previous visit (from the past 24 hour(s)).   No results found for: VALF2, VALAC, VALP, VALPR, DS6, CRBAM, CRBAMP, CARB2, XCRBAM  No results found for: Sultana [facility]ters REPORTS:(reviewed/updated 1/23/2018)  No results found.        MEDICATIONS     ALL MEDICATIONS:   Current Facility-Administered Medications   Medication Dose Route Frequency    memantine (NAMENDA) tablet 5 mg  5 mg Oral BID    risperiDONE (RisperDAL) 1 mg/mL oral solution soln 1 mg  1 mg Oral DAILY    risperiDONE (RisperDAL) 1 mg/mL oral solution soln 3 mg  3 mg Oral QHS    mirtazapine (REMERON) tablet 15 mg  15 mg Oral QHS    aspirin chewable tablet 81 mg  81 mg Oral DAILY    bisacodyl (DULCOLAX) suppository 10 mg  10 mg Rectal DAILY PRN    finasteride (PROSCAR) tablet 5 mg  5 mg Oral DAILY    ketoconazole (NIZORAL) 2 % shampoo   Topical DAILY PRN    polyethylene glycol (MIRALAX) packet 17 g  17 g Oral DAILY    senna (SENOKOT) tablet 17.2 mg  2 Tab Oral QHS    tamsulosin (FLOMAX) capsule 0.4 mg  0.4 mg Oral DAILY    triamcinolone acetonide (KENALOG) 0.1 % cream   Topical BID PRN    OLANZapine (ZyPREXA) tablet 2.5 mg  2.5 mg Oral Q6H PRN    ziprasidone (GEODON) 10 mg in sterile water (preservative free) 0.5 mL injection  10 mg IntraMUSCular BID PRN    benztropine (COGENTIN) tablet 1 mg  1 mg Oral BID PRN    benztropine (COGENTIN) injection 1 mg  1 mg IntraMUSCular BID PRN    zolpidem (AMBIEN) tablet 5 mg  5 mg Oral QHS PRN    acetaminophen (TYLENOL) tablet 650 mg  650 mg Oral Q4H PRN    ibuprofen (MOTRIN) tablet 400 mg  400 mg Oral Q8H PRN    magnesium hydroxide (MILK OF MAGNESIA) 400 mg/5 mL oral suspension 30 mL  30 mL Oral DAILY PRN    nicotine (NICODERM CQ) 21 mg/24 hr patch 1 Patch  1 Patch TransDERmal DAILY PRN      SCHEDULED MEDICATIONS:   Current Facility-Administered Medications   Medication Dose Route Frequency    memantine (NAMENDA) tablet 5 mg  5 mg Oral BID    risperiDONE (RisperDAL) 1 mg/mL oral solution soln 1 mg  1 mg Oral DAILY    risperiDONE (RisperDAL) 1 mg/mL oral solution soln 3 mg  3 mg Oral QHS    mirtazapine (REMERON) tablet 15 mg  15 mg Oral QHS    aspirin chewable tablet 81 mg  81 mg Oral DAILY    finasteride (PROSCAR) tablet 5 mg  5 mg Oral DAILY    polyethylene glycol (MIRALAX) packet 17 g  17 g Oral DAILY    senna (SENOKOT) tablet 17.2 mg  2 Tab Oral QHS    tamsulosin (FLOMAX) capsule 0.4 mg  0.4 mg Oral DAILY          ASSESSMENT & PLAN     DIAGNOSES REQUIRING ACTIVE TREATMENT AND MONITORING: (reviewed/updated 1/23/2018)  Patient Active Hospital Problem List:   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 Change to liquid Risperdal- PT consult. Add Namenda     Agitation (1/19/2018)    Assessment: severe, no aggression but tendency to get loud, scream and curse staff, need several re direction    Plan: prn med              I will continue to monitor blood levels (Depakote---a drug with a narrow therapeutic index= NTI) and associated labs for drug therapy implemented that require intense monitoring for toxicity as deemed appropriate based on current medication side effects and pharmacodynamically determined drug 1/2 lives. In summary, Ramiro Gautam, is a 76 y.o.  male who presents with a severe exacerbation of the principal diagnosis of 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  Patient's condition is worsening/not improving/not stable. Patient requires continued inpatient hospitalization for further stabilization, safety monitoring and medication management. I will continue to coordinate the provision of individual, milieu, occupational, group, and substance abuse therapies to address target symptoms/diagnoses as deemed appropriate for the individual patient. A coordinated, multidisplinary treatment team round was conducted with the patient (this team consists of the nurse, psychiatric unit pharmcist,  and writer).      Complete current electronic health record for patient has been reviewed today including consultant notes, ancillary staff notes, nurses and psychiatric tech notes. Suicide risk assessment completed and patient deemed to be of low risk for suicide at this time. The following regarding medications was addressed during rounds with patient:   the risks and benefits of the proposed medication. The patient was given the opportunity to ask questions. Informed consent given to the use of the above medications. Will continue to adjust psychiatric and non-psychiatric medications (see above \"medication\" section and orders section for details) as deemed appropriate & based upon diagnoses and response to treatment. I will continue to order blood tests/labs and diagnostic tests as deemed appropriate and review results as they become available (see orders for details and above listed lab/test results). I will order psychiatric records from previous [facility] to further elucidate the nature of patient's psychopathology and review once available. I will gather additional collateral information from friends, family and o/p treatment team to further elucidate the nature of patient's psychopathology and baselline level of psychiatric functioning. I certify that this patient's inpatient psychiatric hospital services furnished since the previous certification were, and continue to be, required for treatment that could reasonably be expected to improve the patient's condition, or for diagnostic study, and that the patient continues to need, on a daily basis, active treatment furnished directly by or requiring the supervision of inpatient psychiatric facility personnel. In addition, the hospital records show that services furnished were intensive treatment services, admission or related services, or equivalent services.     EXPECTED DISCHARGE DATE/DAY: TBD     DISPOSITION: Home       Signed By:   Mayra Penaloza MD  1/23/2018

## 2018-01-24 NOTE — PROGRESS NOTES
Problem: Altered Thought Process (Adult/Pediatric)  Goal: *STG: Remains safe in hospital  Outcome: Progressing Towards Goal  Pt slept 3 hours. Pt required constant redirection. He acted out and postured at staff after attempting to elope naked. No PRN's given. Continuing to monitor with q15 min rounds for safety.

## 2018-01-24 NOTE — PROGRESS NOTES
Pt cleared for Therapy from nursing. Pt refusing PT this date, 2 attempts. Will follow up to continue to advance independent mobility.     Emmitt Romberg

## 2018-01-24 NOTE — BH NOTES
PSYCHIATRIC PROGRESS NOTE         Patient Name  Frances Elder   Date of Birth 1940   [facility] [id number]   Medical Record Number  [id number]      Age  76 y.o. PCP None   Admit date:  1/18/2018    Room Number  321/01  @ [facility]   Date of Service  1/24/2018           E & M PROGRESS NOTE:         HISTORY       CC:  \" confused , disoriented\"  HISTORY OF PRESENT ILLNESS/INTERVAL HISTORY:  (reviewed/updated 1/24/2018). per initial evaluation: The patient, Frances Elder, is a 76 y.o. WHITE [place]  male with a past psychiatric history significant for dementia, who presents at this time with complaints of (and/or evidence of) the following emotional symptoms: agitation and memory impairment. Additional symptomatology include poor attention, confused, disoriented to time and place. He is a poor historian, distractible, loud and wandering behavior. The above symptoms have been present since last october. These symptoms are of severe severity. These symptoms are constant  in nature. The patient's condition has been precipitated by and psychosocial stressors ( ). Patient's condition made worse by treatment noncompliance. UDS: +benzo; BAL=0. Frances Elder presents/reports/evidences the following emotional symptoms today, 1/24/2018:agitation and paranoid behavior. The above symptoms have been present for few months. These symptoms are of severe severity. The symptoms are constant in nature. Additional symptomatology and features include memory impairment, episodic agitation and poor sleep. Pt has been posturing and threatening staff and received prn med. Wandering behavior but redirectable. 1/21- remains confused , disoriented, disrobing,episodic yelling and screaming, disheveled. Refusing po med  1/22- minimal change in presentation, no aggressive episode. Need prompting and redirection. Wandering, confused  1/23- episodic of screaming, yelling outburst. Pt is redirectable .  Confused and disoriented. Sleep remains poor  1/24- irritable, episodic anger outburst but redirectable. Slept 3 hrs but tend to nap during day time. Reported to be posturing at staff but no physical aggression. Appear to do well in the morning      SIDE EFFECTS: (reviewed/updated 1/24/2018)  None reported or admitted to. No noted toxicity with use of current med   ALLERGIES:(reviewed/updated 1/24/2018)  Allergies   Allergen Reactions    Erythromycin Hives    Isopropyl Alcohol Hives    Neosporin [Neomycin-Bacitracnzn-Polymyxnb] Rash    Pcn [Penicillins] Rash    Sulfa (Sulfonamide Antibiotics) Rash      MEDICATIONS PRIOR TO ADMISSION:(reviewed/updated 1/24/2018)  Prescriptions Prior to Admission   Medication Sig    risperiDONE (RISPERDAL) 1 mg tablet Take 3 mg by mouth nightly. Indications: Behavioral disturbances associated with dementia    risperiDONE (RISPERDAL) 0.5 mg tablet Take 0.5 mg by mouth two (2) times a day. At 6 AM and 2 PM   Indications: Behavioral disturbances associated with dementia    senna (SENNA) 8.6 mg tablet Take 2 Tabs by mouth nightly. Indications: constipation    triamcinolone acetonide (KENALOG) 0.1 % topical cream Apply  to affected area two (2) times daily as needed for Skin Irritation. use thin layer    aspirin 81 mg chewable tablet Take 81 mg by mouth daily. Indications: myocardial infarction prevention, prevention of cerebrovascular accident    bisacodyl (DULCOLAX, BISACODYL,) 10 mg suppository Insert 10 mg into rectum daily as needed (Constipation).  finasteride (PROSCAR) 5 mg tablet Take 5 mg by mouth daily. Indications: benign prostatic hyperplasia with lower urinary tract sx    ketoconazole (NIZORAL) 2 % shampoo Apply  to affected area daily as needed for Itching. Patient typically uses 2-3 times/week    ondansetron hcl (ZOFRAN, AS HYDROCHLORIDE,) 4 mg tablet Take 4 mg by mouth every eight (8) hours as needed for Nausea.     polyethylene glycol (MIRALAX) 17 gram packet Take 17 g by mouth daily. Indications: constipation    tamsulosin (FLOMAX) 0.4 mg capsule Take 0.4 mg by mouth daily. Indications: benign prostatic hyperplasia with lower urinary tract sx      PAST MEDICAL HISTORY: Past medical history from the initial psychiatric evaluation has been reviewed (reviewed/updated 1/24/2018) with no additional updates (I asked patient and no additional past medical history provided). Past Medical History:   Diagnosis Date    BPH (benign prostatic hyperplasia)    No past surgical history on file. SOCIAL HISTORY: Social history from the initial psychiatric evaluation has been reviewed (reviewed/updated 1/24/2018) with no additional updates (I asked patient and no additional social history provided). Social History     Social History    Marital status: SINGLE     Spouse name: N/A    Number of children: N/A    Years of education: N/A     Occupational History    Not on file. 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 No family history on file.     REVIEW OF SYSTEMS: (reviewed/updated 1/24/2018)  Appetite:no change from normal   Sleep: does not feel rested and poor with DIMS (difficulty initiating & maintaining sleep)   All other Review of Systems: Psychological ROS: positive for - behavioral disorder, concentration difficulties, disorientation, hostility, memory difficulties and sleep disturbances  Respiratory ROS: no cough, shortness of breath, or wheezing  Cardiovascular ROS: no chest pain or dyspnea on exertion         [facility] (MSE):    MSE FINDINGS ARE WITHIN NORMAL LIMITS (WNL) UNLESS OTHERWISE STATED BELOW. ( ALL OF THE BELOW CATEGORIES OF THE MSE HAVE BEEN REVIEWED (reviewed 1/24/2018) AND UPDATED AS DEEMED APPROPRIATE )  General Presentation age appropriate and disheveled, uncooperative and unreliable   Orientation confused, Alert and Oriented x 1   Vital Signs  See below (reviewed 1/24/2018); Vital Signs (BP, Pulse, & Temp) are within normal limits if not listed below.    Gait and Station Stable/steady, no ataxia   Musculoskeletal System No extrapyramidal symptoms (EPS); no abnormal muscular movements or Tardive Dyskinesia (TD); muscle strength and tone are within normal limits   Language No aphasia or dysarthria   Speech:  monotone and pressured   Thought Processes illogical; slow rate of thoughts; poor abstract reasoning/computation   Thought Associations tangential   Thought Content paranoid delusions, free of hallucinations, confabulation  and internally preoccupied   Suicidal Ideations none   Homicidal Ideations none   Mood:  hostile  and anxious    Affect:  anxious, hostile, increased in intensity and mood-incongruent   Memory recent  impaired   Memory remote:  impaired   Concentration/Attention:  distractable   Fund of Knowledge average   Insight:  poor   Reliability poor   Judgment:  poor          VITALS:     Patient Vitals for the past 24 hrs:   Temp Pulse Resp BP SpO2   01/24/18 0537 95.7 °F (35.4 °C) 95 16 142/82 -   01/23/18 1700 97.8 °F (36.6 °C) (!) 104 20 121/77 94 %     Wt Readings from Last 3 Encounters:   01/18/18 69.4 kg (153 lb)     Temp Readings from Last 3 Encounters:   01/24/18 95.7 °F (35.4 °C)     BP Readings from Last 3 Encounters:   01/24/18 142/82     Pulse Readings from Last 3 Encounters:   01/24/18 95            DATA     LABORATORY DATA:(reviewed/updated 1/24/2018)  No results found for this or any previous visit (from the past 24 hour(s)). No results found for: VALF2, VALAC, VALP, VALPR, DS6, CRBAM, CRBAMP, CARB2, XCRBAM  No results found for: LITHM   RADIOLOGY REPORTS:(reviewed/updated 1/24/2018)  No results found.        MEDICATIONS     ALL MEDICATIONS:   Current Facility-Administered Medications   Medication Dose Route Frequency    memantine (NAMENDA) tablet 10 mg  10 mg Oral BID    risperiDONE (RisperDAL) 1 mg/mL oral solution soln 1 mg  1 mg Oral DAILY    risperiDONE (RisperDAL) 1 mg/mL oral solution soln 3 mg  3 mg Oral QHS    mirtazapine (REMERON) tablet 15 mg  15 mg Oral QHS    aspirin chewable tablet 81 mg  81 mg Oral DAILY    bisacodyl (DULCOLAX) suppository 10 mg  10 mg Rectal DAILY PRN    finasteride (PROSCAR) tablet 5 mg  5 mg Oral DAILY    ketoconazole (NIZORAL) 2 % shampoo   Topical DAILY PRN    polyethylene glycol (MIRALAX) packet 17 g  17 g Oral DAILY    senna (SENOKOT) tablet 17.2 mg  2 Tab Oral QHS    tamsulosin (FLOMAX) capsule 0.4 mg  0.4 mg Oral DAILY    triamcinolone acetonide (KENALOG) 0.1 % cream   Topical BID PRN    OLANZapine (ZyPREXA) tablet 2.5 mg  2.5 mg Oral Q6H PRN    ziprasidone (GEODON) 10 mg in sterile water (preservative free) 0.5 mL injection  10 mg IntraMUSCular BID PRN    benztropine (COGENTIN) tablet 1 mg  1 mg Oral BID PRN    benztropine (COGENTIN) injection 1 mg  1 mg IntraMUSCular BID PRN    zolpidem (AMBIEN) tablet 5 mg  5 mg Oral QHS PRN    acetaminophen (TYLENOL) tablet 650 mg  650 mg Oral Q4H PRN    ibuprofen (MOTRIN) tablet 400 mg  400 mg Oral Q8H PRN    magnesium hydroxide (MILK OF MAGNESIA) 400 mg/5 mL oral suspension 30 mL  30 mL Oral DAILY PRN    nicotine (NICODERM CQ) 21 mg/24 hr patch 1 Patch  1 Patch TransDERmal DAILY PRN      SCHEDULED MEDICATIONS:   Current Facility-Administered Medications   Medication Dose Route Frequency    memantine (NAMENDA) tablet 10 mg  10 mg Oral BID    risperiDONE (RisperDAL) 1 mg/mL oral solution soln 1 mg  1 mg Oral DAILY    risperiDONE (RisperDAL) 1 mg/mL oral solution soln 3 mg  3 mg Oral QHS    mirtazapine (REMERON) tablet 15 mg  15 mg Oral QHS    aspirin chewable tablet 81 mg  81 mg Oral DAILY    finasteride (PROSCAR) tablet 5 mg  5 mg Oral DAILY    polyethylene glycol (MIRALAX) packet 17 g  17 g Oral DAILY    senna (SENOKOT) tablet 17.2 mg  2 Tab Oral QHS    tamsulosin (FLOMAX) capsule 0.4 mg  0.4 mg Oral DAILY          ASSESSMENT & PLAN     DIAGNOSES REQUIRING ACTIVE TREATMENT AND MONITORING: (reviewed/updated 1/24/2018)  Patient Active Hospital Problem List:   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 (1/19/2018)    Assessment: minimal changesevere, poor memory/ attention, wandering, no aggression but period of screaming/yelling    Plan: will ct with Risperdal add Remeron. Change to liquid Risperdal- PT consult. Add Namenda and titrate     Agitation (1/19/2018)    Assessment: severe, no aggression but tendency to get loud, scream and curse staff, need several re direction    Plan: prn med        I will continue to monitor blood levels (Depakote---a drug with a narrow therapeutic index= NTI) and associated labs for drug therapy implemented that require intense monitoring for toxicity as deemed appropriate based on current medication side effects and pharmacodynamically determined drug 1/2 lives. In summary, James Murphy, is a 76 y.o.  male who presents with a severe exacerbation of the principal diagnosis of 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  Patient's condition is worsening/not improving/not stable. Patient requires continued inpatient hospitalization for further stabilization, safety monitoring and medication management.   I will continue to coordinate the provision of individual, milieu, occupational, group, and substance abuse therapies to address target symptoms/diagnoses as deemed appropriate for the individual patient. A coordinated, multidisplinary treatment team round was conducted with the patient (this team consists of the nurse, psychiatric unit pharmcist,  and writer). Complete current electronic health record for patient has been reviewed today including consultant notes, ancillary staff notes, nurses and psychiatric tech notes. Suicide risk assessment completed and patient deemed to be of low risk for suicide at this time. The following regarding medications was addressed during rounds with patient:   the risks and benefits of the proposed medication. The patient was given the opportunity to ask questions. Informed consent given to the use of the above medications. Will continue to adjust psychiatric and non-psychiatric medications (see above \"medication\" section and orders section for details) as deemed appropriate & based upon diagnoses and response to treatment. I will continue to order blood tests/labs and diagnostic tests as deemed appropriate and review results as they become available (see orders for details and above listed lab/test results). I will order psychiatric records from previous [facility] to further elucidate the nature of patient's psychopathology and review once available. I will gather additional collateral information from friends, family and o/p treatment team to further elucidate the nature of patient's psychopathology and baselline level of psychiatric functioning. I certify that this patient's inpatient psychiatric hospital services furnished since the previous certification were, and continue to be, required for treatment that could reasonably be expected to improve the patient's condition, or for diagnostic study, and that the patient continues to need, on a daily basis, active treatment furnished directly by or requiring the supervision of inpatient psychiatric facility personnel.  In addition, the hospital records show that services furnished were intensive treatment services, admission or related services, or equivalent services.     EXPECTED DISCHARGE DATE/DAY: TBD     DISPOSITION: Home       Signed By:   Rivas Gottlieb MD  1/24/2018

## 2018-01-24 NOTE — BH NOTES
Pt visible on the unit for meals and med's, attended no groups or activities. Pt spent most of his time in bed resting, no problem noted. Pt remain on Q 15 min checks for safety, will monitor and offer support when needed.

## 2018-01-25 PROCEDURE — 74011250637 HC RX REV CODE- 250/637: Performed by: PSYCHIATRY & NEUROLOGY

## 2018-01-25 PROCEDURE — 65220000003 HC RM SEMIPRIVATE PSYCH

## 2018-01-25 PROCEDURE — 97116 GAIT TRAINING THERAPY: CPT | Performed by: PHYSICAL THERAPIST

## 2018-01-25 RX ADMIN — SENNOSIDES 17.2 MG: 8.6 TABLET, FILM COATED ORAL at 21:39

## 2018-01-25 RX ADMIN — ASPIRIN 81 MG: 81 TABLET, CHEWABLE ORAL at 08:59

## 2018-01-25 RX ADMIN — FINASTERIDE 5 MG: 5 TABLET, FILM COATED ORAL at 08:59

## 2018-01-25 RX ADMIN — MEMANTINE 10 MG: 10 TABLET ORAL at 08:59

## 2018-01-25 RX ADMIN — TAMSULOSIN HYDROCHLORIDE 0.4 MG: 0.4 CAPSULE ORAL at 08:59

## 2018-01-25 RX ADMIN — Medication 3 MG: at 21:41

## 2018-01-25 RX ADMIN — ZOLPIDEM TARTRATE 5 MG: 5 TABLET, FILM COATED ORAL at 00:59

## 2018-01-25 RX ADMIN — MEMANTINE 10 MG: 10 TABLET ORAL at 17:44

## 2018-01-25 RX ADMIN — ACETAMINOPHEN 650 MG: 325 TABLET, FILM COATED ORAL at 01:15

## 2018-01-25 RX ADMIN — POLYETHYLENE GLYCOL 3350 17 G: 17 POWDER, FOR SOLUTION ORAL at 08:59

## 2018-01-25 RX ADMIN — MIRTAZAPINE 15 MG: 15 TABLET, FILM COATED ORAL at 21:38

## 2018-01-25 RX ADMIN — Medication 1 MG: at 08:59

## 2018-01-25 NOTE — BH NOTES
PSYCHIATRIC PROGRESS NOTE         Patient Name  Sotero Barthel   Date of Birth 1940   [facility] [id number]   Medical Record Number  [id number]      Age  76 y.o. PCP None   Admit date:  1/18/2018    Room Number  321/01  @ [facility]   Date of Service  1/25/2018           E & M PROGRESS NOTE:         HISTORY       CC:  \" confused , disoriented\"  HISTORY OF PRESENT ILLNESS/INTERVAL HISTORY:  (reviewed/updated 1/25/2018). per initial evaluation: The patient, Sotero Barthel, is a 76 y.o. WHITE [place]  male with a past psychiatric history significant for dementia, who presents at this time with complaints of (and/or evidence of) the following emotional symptoms: agitation and memory impairment. Additional symptomatology include poor attention, confused, disoriented to time and place. He is a poor historian, distractible, loud and wandering behavior. The above symptoms have been present since last october. These symptoms are of severe severity. These symptoms are constant  in nature. The patient's condition has been precipitated by and psychosocial stressors ( ). Patient's condition made worse by treatment noncompliance. UDS: +benzo; BAL=0. Sotero Barthel presents/reports/evidences the following emotional symptoms today, 1/25/2018:agitation and paranoid behavior. The above symptoms have been present for few months. These symptoms are of severe severity. The symptoms are constant in nature. Additional symptomatology and features include memory impairment, episodic agitation and poor sleep. Pt has been posturing and threatening staff and received prn med. Wandering behavior but redirectable. 1/21- remains confused , disoriented, disrobing,episodic yelling and screaming, disheveled. Refusing po med  1/22- minimal change in presentation, no aggressive episode. Need prompting and redirection. Wandering, confused  1/23- episodic of screaming, yelling outburst. Pt is redirectable .  Confused and disoriented. Sleep remains poor  1/24- irritable, episodic anger outburst but redirectable. Slept 3 hrs but tend to nap during day time. Reported to be posturing at staff but no physical aggression. Appear to do well in the morning  1/25-affect is sl better. Need redirection but does well. No physical aggression. Sleep remains erratic. Poor STM, need help with care but seek assistance. SIDE EFFECTS: (reviewed/updated 1/25/2018)  None reported or admitted to. No noted toxicity with use of current med   ALLERGIES:(reviewed/updated 1/25/2018)  Allergies   Allergen Reactions    Erythromycin Hives    Isopropyl Alcohol Hives    Neosporin [Neomycin-Bacitracnzn-Polymyxnb] Rash    Pcn [Penicillins] Rash    Sulfa (Sulfonamide Antibiotics) Rash      MEDICATIONS PRIOR TO ADMISSION:(reviewed/updated 1/25/2018)  Prescriptions Prior to Admission   Medication Sig    risperiDONE (RISPERDAL) 1 mg tablet Take 3 mg by mouth nightly. Indications: Behavioral disturbances associated with dementia    risperiDONE (RISPERDAL) 0.5 mg tablet Take 0.5 mg by mouth two (2) times a day. At 6 AM and 2 PM   Indications: Behavioral disturbances associated with dementia    senna (SENNA) 8.6 mg tablet Take 2 Tabs by mouth nightly. Indications: constipation    triamcinolone acetonide (KENALOG) 0.1 % topical cream Apply  to affected area two (2) times daily as needed for Skin Irritation. use thin layer    aspirin 81 mg chewable tablet Take 81 mg by mouth daily. Indications: myocardial infarction prevention, prevention of cerebrovascular accident    bisacodyl (DULCOLAX, BISACODYL,) 10 mg suppository Insert 10 mg into rectum daily as needed (Constipation).  finasteride (PROSCAR) 5 mg tablet Take 5 mg by mouth daily. Indications: benign prostatic hyperplasia with lower urinary tract sx    ketoconazole (NIZORAL) 2 % shampoo Apply  to affected area daily as needed for Itching.  Patient typically uses 2-3 times/week    ondansetron hcl (ZOFRAN, AS HYDROCHLORIDE,) 4 mg tablet Take 4 mg by mouth every eight (8) hours as needed for Nausea.  polyethylene glycol (MIRALAX) 17 gram packet Take 17 g by mouth daily. Indications: constipation    tamsulosin (FLOMAX) 0.4 mg capsule Take 0.4 mg by mouth daily. Indications: benign prostatic hyperplasia with lower urinary tract sx      PAST MEDICAL HISTORY: Past medical history from the initial psychiatric evaluation has been reviewed (reviewed/updated 1/25/2018) with no additional updates (I asked patient and no additional past medical history provided). Past Medical History:   Diagnosis Date    BPH (benign prostatic hyperplasia)    No past surgical history on file. SOCIAL HISTORY: Social history from the initial psychiatric evaluation has been reviewed (reviewed/updated 1/25/2018) with no additional updates (I asked patient and no additional social history provided). Social History     Social History    Marital status: SINGLE     Spouse name: N/A    Number of children: N/A    Years of education: N/A     Occupational History    Not on file. 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 No family history on file.     REVIEW OF SYSTEMS: (reviewed/updated 1/25/2018)  Appetite:no change from normal   Sleep: does not feel rested and poor with DIMS (difficulty initiating & maintaining sleep)   All other Review of Systems: Psychological ROS: positive for - behavioral disorder, concentration difficulties, disorientation, hostility, memory difficulties and sleep disturbances  Respiratory ROS: no cough, shortness of breath, or wheezing  Cardiovascular ROS: no chest pain or dyspnea on exertion         [address] (MSE):    MSE FINDINGS ARE WITHIN NORMAL LIMITS (WNL) UNLESS OTHERWISE STATED BELOW. ( ALL OF THE BELOW CATEGORIES OF THE MSE HAVE BEEN REVIEWED (reviewed 1/25/2018) AND UPDATED AS DEEMED APPROPRIATE )  General Presentation age appropriate and disheveled, uncooperative and unreliable   Orientation confused, Alert and Oriented x 1   Vital Signs  See below (reviewed 1/25/2018); Vital Signs (BP, Pulse, & Temp) are within normal limits if not listed below. Gait and Station Stable/steady, no ataxia   Musculoskeletal System No extrapyramidal symptoms (EPS); no abnormal muscular movements or Tardive Dyskinesia (TD); muscle strength and tone are within normal limits   Language No aphasia or dysarthria   Speech:  monotone and pressured   Thought Processes illogical; slow rate of thoughts; poor abstract reasoning/computation   Thought Associations tangential   Thought Content paranoid delusions, free of hallucinations, confabulation  and internally preoccupied   Suicidal Ideations none   Homicidal Ideations none   Mood:  hostile  and anxious    Affect:  anxious, hostile, increased in intensity and mood-incongruent   Memory recent  impaired   Memory remote:  impaired   Concentration/Attention:  distractable   Fund of Knowledge average   Insight:  poor   Reliability poor   Judgment:  poor          VITALS:     No data found.     Wt Readings from Last 3 Encounters:   01/18/18 69.4 kg (153 lb)     Temp Readings from Last 3 Encounters:   01/24/18 95.7 °F (35.4 °C)     BP Readings from Last 3 Encounters:   01/24/18 142/82     Pulse Readings from Last 3 Encounters:   01/24/18 95            DATA     LABORATORY DATA:(reviewed/updated 1/25/2018)  No results found for this or any previous visit (from the past 24 hour(s)). No results found for: VALF2, VALAC, VALP, VALPR, DS6, CRBAM, CRBAMP, CARB2, XCRBAM  No results found for: LITHM   RADIOLOGY REPORTS:(reviewed/updated 1/25/2018)  No results found.        MEDICATIONS     ALL MEDICATIONS:   Current Facility-Administered Medications   Medication Dose Route Frequency    memantine (NAMENDA) tablet 10 mg  10 mg Oral BID    risperiDONE (RisperDAL) 1 mg/mL oral solution soln 1 mg  1 mg Oral DAILY    risperiDONE (RisperDAL) 1 mg/mL oral solution soln 3 mg  3 mg Oral QHS    mirtazapine (REMERON) tablet 15 mg  15 mg Oral QHS    aspirin chewable tablet 81 mg  81 mg Oral DAILY    bisacodyl (DULCOLAX) suppository 10 mg  10 mg Rectal DAILY PRN    finasteride (PROSCAR) tablet 5 mg  5 mg Oral DAILY    ketoconazole (NIZORAL) 2 % shampoo   Topical DAILY PRN    polyethylene glycol (MIRALAX) packet 17 g  17 g Oral DAILY    senna (SENOKOT) tablet 17.2 mg  2 Tab Oral QHS    tamsulosin (FLOMAX) capsule 0.4 mg  0.4 mg Oral DAILY    triamcinolone acetonide (KENALOG) 0.1 % cream   Topical BID PRN    OLANZapine (ZyPREXA) tablet 2.5 mg  2.5 mg Oral Q6H PRN    ziprasidone (GEODON) 10 mg in sterile water (preservative free) 0.5 mL injection  10 mg IntraMUSCular BID PRN    benztropine (COGENTIN) tablet 1 mg  1 mg Oral BID PRN    benztropine (COGENTIN) injection 1 mg  1 mg IntraMUSCular BID PRN    zolpidem (AMBIEN) tablet 5 mg  5 mg Oral QHS PRN    acetaminophen (TYLENOL) tablet 650 mg  650 mg Oral Q4H PRN    ibuprofen (MOTRIN) tablet 400 mg  400 mg Oral Q8H PRN    magnesium hydroxide (MILK OF MAGNESIA) 400 mg/5 mL oral suspension 30 mL  30 mL Oral DAILY PRN    nicotine (NICODERM CQ) 21 mg/24 hr patch 1 Patch  1 Patch TransDERmal DAILY PRN      SCHEDULED MEDICATIONS:   Current Facility-Administered Medications   Medication Dose Route Frequency    memantine (NAMENDA) tablet 10 mg  10 mg Oral BID    risperiDONE (RisperDAL) 1 mg/mL oral solution soln 1 mg  1 mg Oral DAILY    risperiDONE (RisperDAL) 1 mg/mL oral solution soln 3 mg  3 mg Oral QHS    mirtazapine (REMERON) tablet 15 mg  15 mg Oral QHS    aspirin chewable tablet 81 mg  81 mg Oral DAILY    finasteride (PROSCAR) tablet 5 mg  5 mg Oral DAILY    polyethylene glycol (MIRALAX) packet 17 g  17 g Oral DAILY    senna (SENOKOT) tablet 17.2 mg  2 Tab Oral QHS    tamsulosin (FLOMAX) capsule 0.4 mg  0.4 mg Oral DAILY          ASSESSMENT & PLAN     DIAGNOSES REQUIRING ACTIVE TREATMENT AND MONITORING: (reviewed/updated 1/25/2018)  Patient Active Hospital Problem List:   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 (1/19/2018)    Assessment: slow progress- no physical aggression, affect sl better, poor memory/ attention, wandering,     Plan: will ct with current med- Namenda, liq Risperdal, Remeron     Agitation (1/19/2018)    Assessment: severe, no aggression but tendency to get loud, scream and curse staff, need several re direction    Plan: prn med        I will continue to monitor blood levels (Depakote---a drug with a narrow therapeutic index= NTI) and associated labs for drug therapy implemented that require intense monitoring for toxicity as deemed appropriate based on current medication side effects and pharmacodynamically determined drug 1/2 lives. In summary, Cuco Enamorado, is a 76 y.o.  male who presents with a severe exacerbation of the principal diagnosis of 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  Patient's condition is worsening/not improving/not stable. Patient requires continued inpatient hospitalization for further stabilization, safety monitoring and medication management. I will continue to coordinate the provision of individual, milieu, occupational, group, and substance abuse therapies to address target symptoms/diagnoses as deemed appropriate for the individual patient.   A coordinated, multidisplinary treatment team round was conducted with the patient (this team consists of the nurse, psychiatric unit pharmcist,  and writer). Complete current electronic health record for patient has been reviewed today including consultant notes, ancillary staff notes, nurses and psychiatric tech notes. Suicide risk assessment completed and patient deemed to be of low risk for suicide at this time. The following regarding medications was addressed during rounds with patient:   the risks and benefits of the proposed medication. The patient was given the opportunity to ask questions. Informed consent given to the use of the above medications. Will continue to adjust psychiatric and non-psychiatric medications (see above \"medication\" section and orders section for details) as deemed appropriate & based upon diagnoses and response to treatment. I will continue to order blood tests/labs and diagnostic tests as deemed appropriate and review results as they become available (see orders for details and above listed lab/test results). I will order psychiatric records from previous [facility] hospitals to further elucidate the nature of patient's psychopathology and review once available. I will gather additional collateral information from friends, family and o/p treatment team to further elucidate the nature of patient's psychopathology and baselline level of psychiatric functioning. I certify that this patient's inpatient psychiatric hospital services furnished since the previous certification were, and continue to be, required for treatment that could reasonably be expected to improve the patient's condition, or for diagnostic study, and that the patient continues to need, on a daily basis, active treatment furnished directly by or requiring the supervision of inpatient psychiatric facility personnel.  In addition, the hospital records show that services furnished were intensive treatment services, admission or related services, or equivalent services.     EXPECTED DISCHARGE DATE/DAY: TBD     DISPOSITION: Home       Signed By:   Alfonzo Mcdowell MD  1/25/2018

## 2018-01-25 NOTE — PROGRESS NOTES
GROUP THERAPY PROGRESS NOTE      Kian Baca was not present for medication group.       Caden Kumar, PHARMD, BCPS

## 2018-01-25 NOTE — PROGRESS NOTES
Problem: Altered Thought Process (Adult/Pediatric)  Goal: *STG: Remains safe in hospital  Outcome: Not Progressing Towards Goal  Pt slept 2 hours. Pt continues to wander halls and require frequent redirection. He acts out and becomes aggressive when approached in a manner that he doesn't agree with. PRN Ambien given to aid in sleep. Continuing to monitor with q15 min rounds for safety.

## 2018-01-25 NOTE — PROGRESS NOTES
Problem: Altered Thought Process (Adult/Pediatric)  Goal: *LTG: Returns to baseline functioning  Outcome: Progressing Towards Goal  Patient continues to be confused, but affect is brighter. Patient noted smiling on several occasions. Patient initiated some conversation. Patient asked for assistance in finding chap stick. Patient also stated it was good to have someone take care of his laundry. PT visited patient and ambulated with him in the hallway. No aggressive behavior noted.

## 2018-01-25 NOTE — PROGRESS NOTES
Problem: Mobility Impaired (Adult and Pediatric)  Goal: *Acute Goals and Plan of Care (Insert Text)  Physical Therapy Goals  Initiated 1/23/2018  1. Patient will move from supine to sit and sit to supine , scoot up and down and roll side to side in bed with independence within 7 day(s). 2.  Patient will transfer from bed to chair and chair to bed with independence using the least restrictive device within 7 day(s). 3.  Patient will perform sit to stand with independence within 7 day(s). 4.  Patient will ambulate with modified independence for 300 feet with the least restrictive device within 7 day(s). physical Therapy TREATMENT  Patient: Agusto Hancock (89 y.o. male)  Date: 1/25/2018  Diagnosis: Alzheimers Disease  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       Precautions: Fall  Chart, physical therapy assessment, plan of care and goals were reviewed. ASSESSMENT:  Patient pleasant and cooperative this date. Patient independent with bed mobility and transfers. Patient ambulated 125 feet with rollator walker and stand-by assistance. Patient required verbal cueing to increase stride length for energy conservation. No loss of balance noted. Patient demonstrates significant forward head and rounded shoulders posture. Progression toward goals:  [x]    Improving appropriately and progressing toward goals  []    Improving slowly and progressing toward goals  []    Not making progress toward goals and plan of care will be adjusted     PLAN:  Patient continues to benefit from skilled intervention to address the above impairments. Continue treatment per established plan of care. Discharge Recommendations:  24/7 supervision   Further Equipment Recommendations for Discharge:  Patient has rollator walker     SUBJECTIVE:   Patient stated I feel like my walking is a little better.     OBJECTIVE DATA SUMMARY:   Critical Behavior:  Neurologic State: Alert, Confused, Irritable  Orientation Level: Disoriented X4     Functional Mobility Training:  Bed Mobility:  Supine to Sit: Independent  Scooting: Independent     Transfers:  Sit to Stand: Modified independent  Stand to Sit: Modified independent     Balance:  Sitting: Intact  Standing: Intact; With support     Ambulation/Gait Training:  Distance (ft): 125 Feet (ft)  Assistive Device: Gait belt;Walker, rollator  Ambulation - Level of Assistance: Stand-by asssistance  Gait Abnormalities: Decreased step clearance  Base of Support: Narrowed  Speed/Bety: Shuffled  Step Length: Left shortened;Right shortened       Pain:  Pain Scale 1: Numeric (0 - 10)  Pain Intensity 1: 0  Pain Location 1: Head     Activity Tolerance:   Good  Please refer to the flowsheet for vital signs taken during this treatment.   After treatment:   [x]    Patient left in no apparent distress sitting up in chair in day room with supervision  []    Patient left in no apparent distress in bed  []    Call bell left within reach  [x]    Nursing notified  []    Caregiver present  []    Bed alarm activated    COMMUNICATION/COLLABORATION:   The patients plan of care was discussed with: Physical Therapist and Registered Nurse    Jad Montgomery, PT   Time Calculation: 10 mins

## 2018-01-26 PROCEDURE — 65220000003 HC RM SEMIPRIVATE PSYCH

## 2018-01-26 PROCEDURE — 74011250637 HC RX REV CODE- 250/637: Performed by: PSYCHIATRY & NEUROLOGY

## 2018-01-26 RX ADMIN — MEMANTINE 10 MG: 10 TABLET ORAL at 08:48

## 2018-01-26 RX ADMIN — Medication 3 MG: at 21:12

## 2018-01-26 RX ADMIN — TAMSULOSIN HYDROCHLORIDE 0.4 MG: 0.4 CAPSULE ORAL at 08:48

## 2018-01-26 RX ADMIN — ASPIRIN 81 MG: 81 TABLET, CHEWABLE ORAL at 08:48

## 2018-01-26 RX ADMIN — SENNOSIDES 17.2 MG: 8.6 TABLET, FILM COATED ORAL at 21:12

## 2018-01-26 RX ADMIN — POLYETHYLENE GLYCOL 3350 17 G: 17 POWDER, FOR SOLUTION ORAL at 08:47

## 2018-01-26 RX ADMIN — Medication 1 MG: at 08:47

## 2018-01-26 RX ADMIN — FINASTERIDE 5 MG: 5 TABLET, FILM COATED ORAL at 08:48

## 2018-01-26 RX ADMIN — MIRTAZAPINE 15 MG: 15 TABLET, FILM COATED ORAL at 21:11

## 2018-01-26 NOTE — PROGRESS NOTES
Problem: Altered Thought Process (Adult/Pediatric)  Goal: *STG: Remains safe in hospital  Outcome: Not Progressing Towards Goal  Pt slept 1 hour. Pt required constant redirection and was yelling and wandering. He is aggressive and angry. Continuing to monitor with q15 min rounds for safety.

## 2018-01-26 NOTE — PROGRESS NOTES
Problem: Altered Thought Process (Adult/Pediatric)  Goal: *LTG: Returns to baseline functioning  Outcome: Progressing Towards Goal  Patient affect is flat with some range noted. Mood remains depressed. Patient noted to smile and initiate conversation more today. Patient has not required as much redirecting today as yesterday. Patient was up most of the morning and laid down afterlunch. Patient has not been aggressive or yelling this shift.

## 2018-01-26 NOTE — BH NOTES
PSYCHIATRIC PROGRESS NOTE         Patient Name  Caden Banegas   Date of Birth 1940   [facility] [id number]   Medical Record Number  [id number]      Age  76 y.o. PCP None   Admit date:  1/18/2018    Room Number  321/01  @ [facility]   Date of Service  1/26/2018           E & M PROGRESS NOTE:         HISTORY       CC:  \" confused , disoriented\"  HISTORY OF PRESENT ILLNESS/INTERVAL HISTORY:  (reviewed/updated 1/26/2018). per initial evaluation: The patient, Caden Banegas, is a 76 y.o. WHITE [place]  male with a past psychiatric history significant for dementia, who presents at this time with complaints of (and/or evidence of) the following emotional symptoms: agitation and memory impairment. Additional symptomatology include poor attention, confused, disoriented to time and place. He is a poor historian, distractible, loud and wandering behavior. The above symptoms have been present since last october. These symptoms are of severe severity. These symptoms are constant  in nature. The patient's condition has been precipitated by and psychosocial stressors ( ). Patient's condition made worse by treatment noncompliance. UDS: +benzo; BAL=0. Caden Banegas presents/reports/evidences the following emotional symptoms today, 1/26/2018:agitation and paranoid behavior. The above symptoms have been present for few months. These symptoms are of severe severity. The symptoms are constant in nature. Additional symptomatology and features include memory impairment, episodic agitation and poor sleep. Pt has been posturing and threatening staff and received prn med. Wandering behavior but redirectable. 1/21- remains confused , disoriented, disrobing,episodic yelling and screaming, disheveled. Refusing po med  1/22- minimal change in presentation, no aggressive episode. Need prompting and redirection. Wandering, confused  1/23- episodic of screaming, yelling outburst. Pt is redirectable .  Confused and disoriented. Sleep remains poor  1/24- irritable, episodic anger outburst but redirectable. Slept 3 hrs but tend to nap during day time. Reported to be posturing at staff but no physical aggression. Appear to do well in the morning  1/25-affect is sl better. Need redirection but does well. No physical aggression. Sleep remains erratic. Poor STM, need help with care but seek assistance. 1/25- pleasant this am, sl better, no aggression and sleep is erratic. No sig behavior problem and accepting med      SIDE EFFECTS: (reviewed/updated 1/26/2018)  None reported or admitted to. No noted toxicity with use of current med   ALLERGIES:(reviewed/updated 1/26/2018)  Allergies   Allergen Reactions    Erythromycin Hives    Isopropyl Alcohol Hives    Neosporin [Neomycin-Bacitracnzn-Polymyxnb] Rash    Pcn [Penicillins] Rash    Sulfa (Sulfonamide Antibiotics) Rash      MEDICATIONS PRIOR TO ADMISSION:(reviewed/updated 1/26/2018)  Prescriptions Prior to Admission   Medication Sig    risperiDONE (RISPERDAL) 1 mg tablet Take 3 mg by mouth nightly. Indications: Behavioral disturbances associated with dementia    risperiDONE (RISPERDAL) 0.5 mg tablet Take 0.5 mg by mouth two (2) times a day. At 6 AM and 2 PM   Indications: Behavioral disturbances associated with dementia    senna (SENNA) 8.6 mg tablet Take 2 Tabs by mouth nightly. Indications: constipation    triamcinolone acetonide (KENALOG) 0.1 % topical cream Apply  to affected area two (2) times daily as needed for Skin Irritation. use thin layer    aspirin 81 mg chewable tablet Take 81 mg by mouth daily. Indications: myocardial infarction prevention, prevention of cerebrovascular accident    bisacodyl (DULCOLAX, BISACODYL,) 10 mg suppository Insert 10 mg into rectum daily as needed (Constipation).  finasteride (PROSCAR) 5 mg tablet Take 5 mg by mouth daily.  Indications: benign prostatic hyperplasia with lower urinary tract sx    ketoconazole (NIZORAL) 2 % shampoo Apply  to affected area daily as needed for Itching. Patient typically uses 2-3 times/week    ondansetron hcl (ZOFRAN, AS HYDROCHLORIDE,) 4 mg tablet Take 4 mg by mouth every eight (8) hours as needed for Nausea.  polyethylene glycol (MIRALAX) 17 gram packet Take 17 g by mouth daily. Indications: constipation    tamsulosin (FLOMAX) 0.4 mg capsule Take 0.4 mg by mouth daily. Indications: benign prostatic hyperplasia with lower urinary tract sx      PAST MEDICAL HISTORY: Past medical history from the initial psychiatric evaluation has been reviewed (reviewed/updated 1/26/2018) with no additional updates (I asked patient and no additional past medical history provided). Past Medical History:   Diagnosis Date    BPH (benign prostatic hyperplasia)    No past surgical history on file. SOCIAL HISTORY: Social history from the initial psychiatric evaluation has been reviewed (reviewed/updated 1/26/2018) with no additional updates (I asked patient and no additional social history provided). Social History     Social History    Marital status: SINGLE     Spouse name: N/A    Number of children: N/A    Years of education: N/A     Occupational History    Not on file. 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 No family history on file.     REVIEW OF SYSTEMS: (reviewed/updated 1/26/2018)  Appetite:no change from normal   Sleep: does not feel rested and poor with DIMS (difficulty initiating & maintaining sleep)   All other Review of Systems: Psychological ROS: positive for - behavioral disorder, concentration difficulties, disorientation, hostility, memory difficulties and sleep disturbances  Respiratory ROS: no cough, shortness of breath, or wheezing  Cardiovascular ROS: no chest pain or dyspnea on exertion         [facility] (MSE):    MSE FINDINGS ARE WITHIN NORMAL LIMITS (WNL) UNLESS OTHERWISE STATED BELOW. ( ALL OF THE BELOW CATEGORIES OF THE MSE HAVE BEEN REVIEWED (reviewed 1/26/2018) AND UPDATED AS DEEMED APPROPRIATE )  General Presentation age appropriate and disheveled, uncooperative and unreliable   Orientation confused, Alert and Oriented x 1   Vital Signs  See below (reviewed 1/26/2018); Vital Signs (BP, Pulse, & Temp) are within normal limits if not listed below.    Gait and Station Stable/steady, no ataxia   Musculoskeletal System No extrapyramidal symptoms (EPS); no abnormal muscular movements or Tardive Dyskinesia (TD); muscle strength and tone are within normal limits   Language No aphasia or dysarthria   Speech:  monotone and pressured   Thought Processes illogical; slow rate of thoughts; poor abstract reasoning/computation   Thought Associations tangential   Thought Content paranoid delusions, free of hallucinations, confabulation  and internally preoccupied   Suicidal Ideations none   Homicidal Ideations none   Mood:  hostile  and anxious    Affect:  anxious, hostile, increased in intensity and mood-incongruent   Memory recent  impaired   Memory remote:  impaired   Concentration/Attention:  distractable   Fund of Knowledge average   Insight:  poor   Reliability poor   Judgment:  poor          VITALS:     Patient Vitals for the past 24 hrs:   Temp Pulse Resp BP SpO2   01/26/18 0651 96.6 °F (35.9 °C) 85 16 149/79 -   01/25/18 1715 98.6 °F (37 °C) 89 18 138/78 97 %     Wt Readings from Last 3 Encounters: 01/18/18 69.4 kg (153 lb)     Temp Readings from Last 3 Encounters:   01/26/18 96.6 °F (35.9 °C)     BP Readings from Last 3 Encounters:   01/26/18 149/79     Pulse Readings from Last 3 Encounters:   01/26/18 85            DATA     LABORATORY DATA:(reviewed/updated 1/26/2018)  No results found for this or any previous visit (from the past 24 hour(s)). No results found for: VALF2, VALAC, VALP, VALPR, DS6, CRBAM, CRBAMP, CARB2, XCRBAM  No results found for: LITHM   RADIOLOGY REPORTS:(reviewed/updated 1/26/2018)  No results found.        MEDICATIONS     ALL MEDICATIONS:   Current Facility-Administered Medications   Medication Dose Route Frequency    memantine (NAMENDA) tablet 10 mg  10 mg Oral BID    risperiDONE (RisperDAL) 1 mg/mL oral solution soln 1 mg  1 mg Oral DAILY    risperiDONE (RisperDAL) 1 mg/mL oral solution soln 3 mg  3 mg Oral QHS    mirtazapine (REMERON) tablet 15 mg  15 mg Oral QHS    aspirin chewable tablet 81 mg  81 mg Oral DAILY    bisacodyl (DULCOLAX) suppository 10 mg  10 mg Rectal DAILY PRN    finasteride (PROSCAR) tablet 5 mg  5 mg Oral DAILY    ketoconazole (NIZORAL) 2 % shampoo   Topical DAILY PRN    polyethylene glycol (MIRALAX) packet 17 g  17 g Oral DAILY    senna (SENOKOT) tablet 17.2 mg  2 Tab Oral QHS    tamsulosin (FLOMAX) capsule 0.4 mg  0.4 mg Oral DAILY    triamcinolone acetonide (KENALOG) 0.1 % cream   Topical BID PRN    OLANZapine (ZyPREXA) tablet 2.5 mg  2.5 mg Oral Q6H PRN    ziprasidone (GEODON) 10 mg in sterile water (preservative free) 0.5 mL injection  10 mg IntraMUSCular BID PRN    benztropine (COGENTIN) tablet 1 mg  1 mg Oral BID PRN    benztropine (COGENTIN) injection 1 mg  1 mg IntraMUSCular BID PRN    zolpidem (AMBIEN) tablet 5 mg  5 mg Oral QHS PRN    acetaminophen (TYLENOL) tablet 650 mg  650 mg Oral Q4H PRN    ibuprofen (MOTRIN) tablet 400 mg  400 mg Oral Q8H PRN    magnesium hydroxide (MILK OF MAGNESIA) 400 mg/5 mL oral suspension 30 mL  30 mL Oral DAILY PRN    nicotine (NICODERM CQ) 21 mg/24 hr patch 1 Patch  1 Patch TransDERmal DAILY PRN      SCHEDULED MEDICATIONS:   Current Facility-Administered Medications   Medication Dose Route Frequency    memantine (NAMENDA) tablet 10 mg  10 mg Oral BID    risperiDONE (RisperDAL) 1 mg/mL oral solution soln 1 mg  1 mg Oral DAILY    risperiDONE (RisperDAL) 1 mg/mL oral solution soln 3 mg  3 mg Oral QHS    mirtazapine (REMERON) tablet 15 mg  15 mg Oral QHS    aspirin chewable tablet 81 mg  81 mg Oral DAILY    finasteride (PROSCAR) tablet 5 mg  5 mg Oral DAILY    polyethylene glycol (MIRALAX) packet 17 g  17 g Oral DAILY    senna (SENOKOT) tablet 17.2 mg  2 Tab Oral QHS    tamsulosin (FLOMAX) capsule 0.4 mg  0.4 mg Oral DAILY          ASSESSMENT & PLAN     DIAGNOSES REQUIRING ACTIVE TREATMENT AND MONITORING: (reviewed/updated 1/26/2018)  Patient Active Hospital Problem List:   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 (1/19/2018)    Assessment: slow progress- no physical aggression, affect sl better, poor memory/ attention, wandering but redirectable    Plan: will ct with current med- Namenda, liq Risperdal, Remeron     Agitation (1/19/2018)    Assessment: severe, no aggression but tendency to get loud, scream and curse staff, need several re direction    Plan: prn med        I will continue to monitor blood levels (Depakote---a drug with a narrow therapeutic index= NTI) and associated labs for drug therapy implemented that require intense monitoring for toxicity as deemed appropriate based on current medication side effects and pharmacodynamically determined drug 1/2 lives. In summary, Cristo Hoffman, is a 76 y.o.  male who presents with a severe exacerbation of the principal diagnosis of 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  Patient's condition is worsening/not improving/not stable.   Patient requires continued inpatient hospitalization for further stabilization, safety monitoring and medication management. I will continue to coordinate the provision of individual, milieu, occupational, group, and substance abuse therapies to address target symptoms/diagnoses as deemed appropriate for the individual patient. A coordinated, multidisplinary treatment team round was conducted with the patient (this team consists of the nurse, psychiatric unit pharmcist,  and writer). Complete current electronic health record for patient has been reviewed today including consultant notes, ancillary staff notes, nurses and psychiatric tech notes. Suicide risk assessment completed and patient deemed to be of low risk for suicide at this time. The following regarding medications was addressed during rounds with patient:   the risks and benefits of the proposed medication. The patient was given the opportunity to ask questions. Informed consent given to the use of the above medications. Will continue to adjust psychiatric and non-psychiatric medications (see above \"medication\" section and orders section for details) as deemed appropriate & based upon diagnoses and response to treatment. I will continue to order blood tests/labs and diagnostic tests as deemed appropriate and review results as they become available (see orders for details and above listed lab/test results). I will order psychiatric records from previous [facility] hospitals to further elucidate the nature of patient's psychopathology and review once available. I will gather additional collateral information from friends, family and o/p treatment team to further elucidate the nature of patient's psychopathology and baselline level of psychiatric functioning.          I certify that this patient's inpatient psychiatric hospital services furnished since the previous certification were, and continue to be, required for treatment that could reasonably be expected to improve the patient's condition, or for diagnostic study, and that the patient continues to need, on a daily basis, active treatment furnished directly by or requiring the supervision of inpatient psychiatric facility personnel. In addition, the hospital records show that services furnished were intensive treatment services, admission or related services, or equivalent services.     EXPECTED DISCHARGE DATE/DAY: TBD     DISPOSITION: Home       Signed By:   Griffin Dominique MD  1/26/2018

## 2018-01-26 NOTE — BH NOTES
GROUP THERAPY PROGRESS NOTE    Kian Hagenbaronsonu is participating in reflections.      Group time: 30 minutes    Personal goal for participation: participate in their treatment plan    Goal orientation: personal    Group therapy participation: Patient did not attend nor participated in group this evening    Therapeutic interventions reviewed and discussed: no    Impression of participation: Negative Participation

## 2018-01-26 NOTE — BH NOTES
Pt has been calling out for an \"Dia\" all night saying \"please come home\". Constantly asking writer where she is and when she's coming back. Not sure who that is but she must be someone dear to his heart.

## 2018-01-26 NOTE — BH NOTES
Social Work     Clinician talked with the pt's caregiver Deonte Jeronimo , he has come to visit him every night. We scheduled to meet on 1/29/17 @ 1:30pm. Mr. Hamida Marie is representative  for the family. Clinician also provided an update to the pt's son Alden Stone. Pt was seen today in treatment team , his mood is less labile during the daytime he was easier to redirect, however he is still having difficulty sleeping at night.

## 2018-01-26 NOTE — BH NOTES
GROUP THERAPY PROGRESS NOTE    The patient Libia Glass a 76 y.o. male is participating in Coping Skills Group. Group time: 45 minutes    Personal goal for participation: To participate in emotions game    Goal orientation:  personal    Group therapy participation: active    Therapeutic interventions reviewed and discussed: life scenarios through pictures    Impression of participation:  The patient was attentive.     Suzanne Whitlock  1/26/2018  1:58 PM

## 2018-01-26 NOTE — PROGRESS NOTES
Problem: Altered Thought Process (Adult/Pediatric)  Goal: *STG: Complies with medication therapy  Outcome: Progressing Towards Goal  Patient up walking without walker at times. Staff reminds patient to use walker. Patient is steadier and is taking longer steps. Patient walked into other patients room x once and was difficult to redirect. Up in day room for dinner and visitation. Patient has been medication and meal compliant, and staff will continue safety checks Q 15 minutes. Caregiver brought medical history and past medicines and placed on front of chart. Less redirection needed. Less aggressiveness.

## 2018-01-27 PROCEDURE — 74011250637 HC RX REV CODE- 250/637: Performed by: PSYCHIATRY & NEUROLOGY

## 2018-01-27 PROCEDURE — 65220000003 HC RM SEMIPRIVATE PSYCH

## 2018-01-27 RX ADMIN — ZOLPIDEM TARTRATE 5 MG: 5 TABLET, FILM COATED ORAL at 01:15

## 2018-01-27 RX ADMIN — SENNOSIDES 17.2 MG: 8.6 TABLET, FILM COATED ORAL at 21:17

## 2018-01-27 RX ADMIN — ASPIRIN 81 MG: 81 TABLET, CHEWABLE ORAL at 09:29

## 2018-01-27 RX ADMIN — FINASTERIDE 5 MG: 5 TABLET, FILM COATED ORAL at 09:28

## 2018-01-27 RX ADMIN — POLYETHYLENE GLYCOL 3350 17 G: 17 POWDER, FOR SOLUTION ORAL at 09:28

## 2018-01-27 RX ADMIN — TAMSULOSIN HYDROCHLORIDE 0.4 MG: 0.4 CAPSULE ORAL at 09:28

## 2018-01-27 RX ADMIN — MIRTAZAPINE 15 MG: 15 TABLET, FILM COATED ORAL at 21:17

## 2018-01-27 RX ADMIN — Medication 1 MG: at 09:29

## 2018-01-27 RX ADMIN — MEMANTINE 10 MG: 10 TABLET ORAL at 16:47

## 2018-01-27 RX ADMIN — MEMANTINE 10 MG: 10 TABLET ORAL at 09:29

## 2018-01-27 RX ADMIN — Medication 3 MG: at 21:18

## 2018-01-27 NOTE — BH NOTES
Problem: Altered Thought Process (Adult/Pediatric)  Goal: *STG: Remains safe in hospital  Outcome: Progressing Towards Goal  Pt is visible in the milieu. Pt is only oriented x 1 to self. Pt is very hostile, impulsive. Pt screams out with violence whenever he is frustrated at his own confusions. Pt is confused and disoriented due to late stage dementia. Pt is restless and hard headed. Pt has no fall this shift. Will continue to monitor q 15 for safety, mood and behavior changes.

## 2018-01-27 NOTE — BH NOTES
Patient slept at late evening, then he was up and unable to back to bed until 0115 hrs, in and out of the room, yelling out loud and disruptive. Ambien 5 mg PO PRN given. Effective. Slept for 4 hours.

## 2018-01-27 NOTE — BH NOTES
Problem: Altered Thought Process (Adult/Pediatric)  Goal: *STG: Remains safe in hospital  Outcome: Progressing Towards Goal  Pt is visible in the milieu and very active. Pt is only oriented x 1 to self. Pt is confused and disoriented due to late stage dementia. Pt is restless and hard headed, in and out dayroom. Pt follows a female pt who he thinks that she is his wife. Pt refused repeated staff verbal directions not to follow her and call her his wife. Pt was given PRN to help him calm down. Pt accepted without incident. Will continue to monitor q 15 for safety, mood and behavior changes.

## 2018-01-27 NOTE — PROGRESS NOTES
Problem: Altered Thought Process (Adult/Pediatric)  Goal: *STG: Remains safe in hospital  Outcome: Progressing Towards Goal  Patient has been asleep during medication time and dinner. Up in hallway with walker, difficult to  Redirect wanting to walk around hallway to back office and conference room. Patient will yell back at times to staff and becomes frustrated easily. Staff will continue safety management issues. Resting in bed during visiting hours.

## 2018-01-28 PROCEDURE — 65220000003 HC RM SEMIPRIVATE PSYCH

## 2018-01-28 PROCEDURE — 74011250637 HC RX REV CODE- 250/637: Performed by: PSYCHIATRY & NEUROLOGY

## 2018-01-28 RX ADMIN — Medication 3 MG: at 21:31

## 2018-01-28 RX ADMIN — SENNOSIDES 17.2 MG: 8.6 TABLET, FILM COATED ORAL at 21:31

## 2018-01-28 RX ADMIN — Medication 1 MG: at 08:50

## 2018-01-28 RX ADMIN — FINASTERIDE 5 MG: 5 TABLET, FILM COATED ORAL at 08:51

## 2018-01-28 RX ADMIN — ASPIRIN 81 MG: 81 TABLET, CHEWABLE ORAL at 08:51

## 2018-01-28 RX ADMIN — MEMANTINE 10 MG: 10 TABLET ORAL at 16:48

## 2018-01-28 RX ADMIN — MIRTAZAPINE 15 MG: 15 TABLET, FILM COATED ORAL at 21:31

## 2018-01-28 RX ADMIN — POLYETHYLENE GLYCOL 3350 17 G: 17 POWDER, FOR SOLUTION ORAL at 08:51

## 2018-01-28 RX ADMIN — TAMSULOSIN HYDROCHLORIDE 0.4 MG: 0.4 CAPSULE ORAL at 08:51

## 2018-01-28 RX ADMIN — MEMANTINE 10 MG: 10 TABLET ORAL at 08:50

## 2018-01-28 NOTE — BH NOTES
Problem: Altered Thought Process (Adult/Pediatric)  Goal: *STG: Remains safe in hospital  Outcome: Progressing Towards Goal  Pt is mostly isolative from the milieu this evening. Pt is only oriented x 1 to self. Pt is very hostile, impulsive. Pt came out of his room in his underwear to scream out in the hallway. Pt is confused and disoriented due to late stage dementia. Pt is restless and very hard headed. Pt has no fall this shift. Will continue to monitor q 15 for safety, mood and behavior changes.

## 2018-01-28 NOTE — BH NOTES
Problem: Altered Thought Process (Adult/Pediatric)  Goal: *STG: Remains safe in hospital  Outcome: Progressing Towards Goal  Pt is mostly isolative from the milieu this evening. Pt is only oriented x 1 to self. Pt is very hostile, impulsive. Pt came out of his room in his underwear to scream out with violence in the hallway whenever he is frustrated at his own confusions. Pt is confused and disoriented due to late stage dementia. Pt is restless and hard headed. Pt has no fall this shift. Will continue to monitor q 15 for safety, mood and behavior changes.

## 2018-01-28 NOTE — BH NOTES
PSYCHIATRIC PROGRESS NOTE         Patient Name  Sotero Barthel   Date of Birth 1940   [facility] [id number]   Medical Record Number  [id number]      Age  76 y.o. PCP None   Admit date:  1/18/2018    Room Number  321/01  @ [facility]   Date of Service  1/27/2018           E & M PROGRESS NOTE:         HISTORY       CC:  \" confused , disoriented\"  HISTORY OF PRESENT ILLNESS/INTERVAL HISTORY:  (reviewed/updated 1/27/2018). per initial evaluation: The patient, Sotero Barthel, is a 76 y.o. WHITE [place]  male with a past psychiatric history significant for dementia, who presents at this time with complaints of (and/or evidence of) the following emotional symptoms: agitation and memory impairment. Additional symptomatology include poor attention, confused, disoriented to time and place. He is a poor historian, distractible, loud and wandering behavior. The above symptoms have been present since last october. These symptoms are of severe severity. These symptoms are constant  in nature. The patient's condition has been precipitated by and psychosocial stressors ( ). Patient's condition made worse by treatment noncompliance. UDS: +benzo; BAL=0. Sotero Barthel presents/reports/evidences the following emotional symptoms today, 1/27/2018:agitation and paranoid behavior. The above symptoms have been present for few months. These symptoms are of severe severity. The symptoms are constant in nature. Additional symptomatology and features include memory impairment, episodic agitation and poor sleep. Pt has been posturing and threatening staff and received prn med. Wandering behavior but redirectable. 1/21- remains confused , disoriented, disrobing,episodic yelling and screaming, disheveled. Refusing po med  1/22- minimal change in presentation, no aggressive episode. Need prompting and redirection. Wandering, confused  1/23- episodic of screaming, yelling outburst. Pt is redirectable .  Confused and disoriented. Sleep remains poor  1/24- irritable, episodic anger outburst but redirectable. Slept 3 hrs but tend to nap during day time. Reported to be posturing at staff but no physical aggression. Appear to do well in the morning  1/25-affect is sl better. Need redirection but does well. No physical aggression. Sleep remains erratic. Poor STM, need help with care but seek assistance. 1/26- pleasant this am, sl better, no aggression and sleep is erratic. No sig behavior problem and accepting med  1/27-Multiple episodes with loud yelling and screaming, impulsive and difficult to redirect      SIDE EFFECTS: (reviewed/updated 1/27/2018)  None reported or admitted to. No noted toxicity with use of current med   ALLERGIES:(reviewed/updated 1/27/2018)  Allergies   Allergen Reactions    Erythromycin Hives    Isopropyl Alcohol Hives    Neosporin [Neomycin-Bacitracnzn-Polymyxnb] Rash    Pcn [Penicillins] Rash    Sulfa (Sulfonamide Antibiotics) Rash      MEDICATIONS PRIOR TO ADMISSION:(reviewed/updated 1/27/2018)  Prescriptions Prior to Admission   Medication Sig    risperiDONE (RISPERDAL) 1 mg tablet Take 3 mg by mouth nightly. Indications: Behavioral disturbances associated with dementia    risperiDONE (RISPERDAL) 0.5 mg tablet Take 0.5 mg by mouth two (2) times a day. At 6 AM and 2 PM   Indications: Behavioral disturbances associated with dementia    senna (SENNA) 8.6 mg tablet Take 2 Tabs by mouth nightly. Indications: constipation    triamcinolone acetonide (KENALOG) 0.1 % topical cream Apply  to affected area two (2) times daily as needed for Skin Irritation. use thin layer    aspirin 81 mg chewable tablet Take 81 mg by mouth daily. Indications: myocardial infarction prevention, prevention of cerebrovascular accident    bisacodyl (DULCOLAX, BISACODYL,) 10 mg suppository Insert 10 mg into rectum daily as needed (Constipation).  finasteride (PROSCAR) 5 mg tablet Take 5 mg by mouth daily.  Indications: benign prostatic hyperplasia with lower urinary tract sx    ketoconazole (NIZORAL) 2 % shampoo Apply  to affected area daily as needed for Itching. Patient typically uses 2-3 times/week    ondansetron hcl (ZOFRAN, AS HYDROCHLORIDE,) 4 mg tablet Take 4 mg by mouth every eight (8) hours as needed for Nausea.  polyethylene glycol (MIRALAX) 17 gram packet Take 17 g by mouth daily. Indications: constipation    tamsulosin (FLOMAX) 0.4 mg capsule Take 0.4 mg by mouth daily. Indications: benign prostatic hyperplasia with lower urinary tract sx      PAST MEDICAL HISTORY: Past medical history from the initial psychiatric evaluation has been reviewed (reviewed/updated 1/27/2018) with no additional updates (I asked patient and no additional past medical history provided). Past Medical History:   Diagnosis Date    BPH (benign prostatic hyperplasia)    No past surgical history on file. SOCIAL HISTORY: Social history from the initial psychiatric evaluation has been reviewed (reviewed/updated 1/27/2018) with no additional updates (I asked patient and no additional social history provided). Social History     Social History    Marital status: SINGLE     Spouse name: N/A    Number of children: N/A    Years of education: N/A     Occupational History    Not on file. 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 No family history on file.     REVIEW OF SYSTEMS: (reviewed/updated 1/27/2018)  Appetite:no change from normal   Sleep: does not feel rested and poor with DIMS (difficulty initiating & maintaining sleep)   All other Review of Systems: Psychological ROS: positive for - behavioral disorder, concentration difficulties, disorientation, hostility, memory difficulties and sleep disturbances  Respiratory ROS: no cough, shortness of breath, or wheezing  Cardiovascular ROS: no chest pain or dyspnea on exertion         [address] [facility] (MSE):    MSE FINDINGS ARE WITHIN NORMAL LIMITS (WNL) UNLESS OTHERWISE STATED BELOW. ( ALL OF THE BELOW CATEGORIES OF THE MSE HAVE BEEN REVIEWED (reviewed 1/27/2018) AND UPDATED AS DEEMED APPROPRIATE )  General Presentation age appropriate and disheveled, uncooperative and unreliable   Orientation confused, Alert and Oriented x 1   Vital Signs  See below (reviewed 1/27/2018); Vital Signs (BP, Pulse, & Temp) are within normal limits if not listed below.    Gait and Station Stable/steady, no ataxia   Musculoskeletal System No extrapyramidal symptoms (EPS); no abnormal muscular movements or Tardive Dyskinesia (TD); muscle strength and tone are within normal limits   Language No aphasia or dysarthria   Speech:  monotone and pressured   Thought Processes illogical; slow rate of thoughts; poor abstract reasoning/computation   Thought Associations tangential   Thought Content paranoid delusions, free of hallucinations, confabulation  and internally preoccupied   Suicidal Ideations none   Homicidal Ideations none   Mood:  hostile  and anxious    Affect:  anxious, hostile, increased in intensity and mood-incongruent   Memory recent  impaired   Memory remote:  impaired   Concentration/Attention:  distractable   Fund of Knowledge average   Insight:  poor   Reliability poor   Judgment:  poor          VITALS:     Patient Vitals for the past 24 hrs:   Temp Pulse Resp BP   01/27/18 0709 98 °F (36.7 °C) 90 18 128/74 Wt Readings from Last 3 Encounters:   01/18/18 69.4 kg (153 lb)     Temp Readings from Last 3 Encounters:   01/27/18 98 °F (36.7 °C)     BP Readings from Last 3 Encounters:   01/27/18 128/74     Pulse Readings from Last 3 Encounters:   01/27/18 90            DATA     LABORATORY DATA:(reviewed/updated 1/27/2018)  No results found for this or any previous visit (from the past 24 hour(s)). No results found for: VALF2, VALAC, VALP, VALPR, DS6, CRBAM, CRBAMP, CARB2, XCRBAM  No results found for: LITHM   RADIOLOGY REPORTS:(reviewed/updated 1/27/2018)  No results found.        MEDICATIONS     ALL MEDICATIONS:   Current Facility-Administered Medications   Medication Dose Route Frequency    memantine (NAMENDA) tablet 10 mg  10 mg Oral BID    risperiDONE (RisperDAL) 1 mg/mL oral solution soln 1 mg  1 mg Oral DAILY    risperiDONE (RisperDAL) 1 mg/mL oral solution soln 3 mg  3 mg Oral QHS    mirtazapine (REMERON) tablet 15 mg  15 mg Oral QHS    aspirin chewable tablet 81 mg  81 mg Oral DAILY    bisacodyl (DULCOLAX) suppository 10 mg  10 mg Rectal DAILY PRN    finasteride (PROSCAR) tablet 5 mg  5 mg Oral DAILY    ketoconazole (NIZORAL) 2 % shampoo   Topical DAILY PRN    polyethylene glycol (MIRALAX) packet 17 g  17 g Oral DAILY    senna (SENOKOT) tablet 17.2 mg  2 Tab Oral QHS    tamsulosin (FLOMAX) capsule 0.4 mg  0.4 mg Oral DAILY    triamcinolone acetonide (KENALOG) 0.1 % cream   Topical BID PRN    OLANZapine (ZyPREXA) tablet 2.5 mg  2.5 mg Oral Q6H PRN    ziprasidone (GEODON) 10 mg in sterile water (preservative free) 0.5 mL injection  10 mg IntraMUSCular BID PRN    benztropine (COGENTIN) tablet 1 mg  1 mg Oral BID PRN    benztropine (COGENTIN) injection 1 mg  1 mg IntraMUSCular BID PRN    zolpidem (AMBIEN) tablet 5 mg  5 mg Oral QHS PRN    acetaminophen (TYLENOL) tablet 650 mg  650 mg Oral Q4H PRN    ibuprofen (MOTRIN) tablet 400 mg  400 mg Oral Q8H PRN    magnesium hydroxide (MILK OF MAGNESIA) 400 mg/5 mL oral suspension 30 mL  30 mL Oral DAILY PRN    nicotine (NICODERM CQ) 21 mg/24 hr patch 1 Patch  1 Patch TransDERmal DAILY PRN      SCHEDULED MEDICATIONS:   Current Facility-Administered Medications   Medication Dose Route Frequency    memantine (NAMENDA) tablet 10 mg  10 mg Oral BID    risperiDONE (RisperDAL) 1 mg/mL oral solution soln 1 mg  1 mg Oral DAILY    risperiDONE (RisperDAL) 1 mg/mL oral solution soln 3 mg  3 mg Oral QHS    mirtazapine (REMERON) tablet 15 mg  15 mg Oral QHS    aspirin chewable tablet 81 mg  81 mg Oral DAILY    finasteride (PROSCAR) tablet 5 mg  5 mg Oral DAILY    polyethylene glycol (MIRALAX) packet 17 g  17 g Oral DAILY    senna (SENOKOT) tablet 17.2 mg  2 Tab Oral QHS    tamsulosin (FLOMAX) capsule 0.4 mg  0.4 mg Oral DAILY          ASSESSMENT & PLAN     DIAGNOSES REQUIRING ACTIVE TREATMENT AND MONITORING: (reviewed/updated 1/27/2018)  Patient Active Hospital Problem List:   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 (1/19/2018)    Assessment: slow progress- no physical aggression, affect sl better, poor memory/ attention, wandering but redirectable    Plan: will ct with current med- Namenda, liq Risperdal, Remeron     Agitation (1/19/2018)    Assessment: severe, no aggression but tendency to get loud, scream and curse staff, need several re direction    Plan: prn med        I will continue to monitor blood levels (Depakote---a drug with a narrow therapeutic index= NTI) and associated labs for drug therapy implemented that require intense monitoring for toxicity as deemed appropriate based on current medication side effects and pharmacodynamically determined drug 1/2 lives. In summary, Frances Elder, is a 76 y.o.  male who presents with a severe exacerbation of the principal diagnosis of 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  Patient's condition is worsening/not improving/not stable.   Patient requires continued inpatient hospitalization for further stabilization, safety monitoring and medication management. I will continue to coordinate the provision of individual, milieu, occupational, group, and substance abuse therapies to address target symptoms/diagnoses as deemed appropriate for the individual patient. A coordinated, multidisplinary treatment team round was conducted with the patient (this team consists of the nurse, psychiatric unit pharmcist,  and writer). Complete current electronic health record for patient has been reviewed today including consultant notes, ancillary staff notes, nurses and psychiatric tech notes. Suicide risk assessment completed and patient deemed to be of low risk for suicide at this time. The following regarding medications was addressed during rounds with patient:   the risks and benefits of the proposed medication. The patient was given the opportunity to ask questions. Informed consent given to the use of the above medications. Will continue to adjust psychiatric and non-psychiatric medications (see above \"medication\" section and orders section for details) as deemed appropriate & based upon diagnoses and response to treatment. I will continue to order blood tests/labs and diagnostic tests as deemed appropriate and review results as they become available (see orders for details and above listed lab/test results). I will order psychiatric records from previous [facility] hospitals to further elucidate the nature of patient's psychopathology and review once available. I will gather additional collateral information from friends, family and o/p treatment team to further elucidate the nature of patient's psychopathology and baselline level of psychiatric functioning.          I certify that this patient's inpatient psychiatric hospital services furnished since the previous certification were, and continue to be, required for treatment that could reasonably be expected to improve the patient's condition, or for diagnostic study, and that the patient continues to need, on a daily basis, active treatment furnished directly by or requiring the supervision of inpatient psychiatric facility personnel. In addition, the hospital records show that services furnished were intensive treatment services, admission or related services, or equivalent services.     EXPECTED DISCHARGE DATE/DAY: TBD     DISPOSITION: Home       Signed By:   Leisa Dorantes MD  1/27/2018

## 2018-01-28 NOTE — BH NOTES
PSYCHIATRIC PROGRESS NOTE         Patient Name  Mandi Malave   Date of Birth 1940   [facility] [id number]   Medical Record Number  [id number]      Age  76 y.o. PCP None   Admit date:  1/18/2018    Room Number  321/01  @ [facility]   Date of Service  1/28/2018           E & M PROGRESS NOTE:         HISTORY       CC:  \" confused , disoriented\"  HISTORY OF PRESENT ILLNESS/INTERVAL HISTORY:  (reviewed/updated 1/28/2018). per initial evaluation: The patient, Mandi Malave, is a 76 y.o. WHITE [place]  male with a past psychiatric history significant for dementia, who presents at this time with complaints of (and/or evidence of) the following emotional symptoms: agitation and memory impairment. Additional symptomatology include poor attention, confused, disoriented to time and place. He is a poor historian, distractible, loud and wandering behavior. The above symptoms have been present since last october. These symptoms are of severe severity. These symptoms are constant  in nature. The patient's condition has been precipitated by and psychosocial stressors ( ). Patient's condition made worse by treatment noncompliance. UDS: +benzo; BAL=0. Mandi Malave presents/reports/evidences the following emotional symptoms today, 1/28/2018:agitation and paranoid behavior. The above symptoms have been present for few months. These symptoms are of severe severity. The symptoms are constant in nature. Additional symptomatology and features include memory impairment, episodic agitation and poor sleep. Pt has been posturing and threatening staff and received prn med. Wandering behavior but redirectable. 1/21- remains confused , disoriented, disrobing,episodic yelling and screaming, disheveled. Refusing po med  1/22- minimal change in presentation, no aggressive episode. Need prompting and redirection. Wandering, confused  1/23- episodic of screaming, yelling outburst. Pt is redirectable .  Confused and disoriented. Sleep remains poor  1/24- irritable, episodic anger outburst but redirectable. Slept 3 hrs but tend to nap during day time. Reported to be posturing at staff but no physical aggression. Appear to do well in the morning  1/25-affect is sl better. Need redirection but does well. No physical aggression. Sleep remains erratic. Poor STM, need help with care but seek assistance. 1/26- pleasant this am, sl better, no aggression and sleep is erratic. No sig behavior problem and accepting med  1/27-Multiple episodes with loud yelling and screaming, impulsive and difficult to redirect. 1/28- Remains behavioral management challenge due to aggressiveness and dementia. Higher sedation will lead to falls propensity. Staff is doing excellent job in persistently redirecting him. SIDE EFFECTS: (reviewed/updated 1/28/2018)  None reported or admitted to. No noted toxicity with use of current med   ALLERGIES:(reviewed/updated 1/28/2018)  Allergies   Allergen Reactions    Erythromycin Hives    Isopropyl Alcohol Hives    Neosporin [Neomycin-Bacitracnzn-Polymyxnb] Rash    Pcn [Penicillins] Rash    Sulfa (Sulfonamide Antibiotics) Rash      MEDICATIONS PRIOR TO ADMISSION:(reviewed/updated 1/28/2018)  Prescriptions Prior to Admission   Medication Sig    risperiDONE (RISPERDAL) 1 mg tablet Take 3 mg by mouth nightly. Indications: Behavioral disturbances associated with dementia    risperiDONE (RISPERDAL) 0.5 mg tablet Take 0.5 mg by mouth two (2) times a day. At 6 AM and 2 PM   Indications: Behavioral disturbances associated with dementia    senna (SENNA) 8.6 mg tablet Take 2 Tabs by mouth nightly. Indications: constipation    triamcinolone acetonide (KENALOG) 0.1 % topical cream Apply  to affected area two (2) times daily as needed for Skin Irritation. use thin layer    aspirin 81 mg chewable tablet Take 81 mg by mouth daily.  Indications: myocardial infarction prevention, prevention of cerebrovascular accident  bisacodyl (DULCOLAX, BISACODYL,) 10 mg suppository Insert 10 mg into rectum daily as needed (Constipation).  finasteride (PROSCAR) 5 mg tablet Take 5 mg by mouth daily. Indications: benign prostatic hyperplasia with lower urinary tract sx    ketoconazole (NIZORAL) 2 % shampoo Apply  to affected area daily as needed for Itching. Patient typically uses 2-3 times/week    ondansetron hcl (ZOFRAN, AS HYDROCHLORIDE,) 4 mg tablet Take 4 mg by mouth every eight (8) hours as needed for Nausea.  polyethylene glycol (MIRALAX) 17 gram packet Take 17 g by mouth daily. Indications: constipation    tamsulosin (FLOMAX) 0.4 mg capsule Take 0.4 mg by mouth daily. Indications: benign prostatic hyperplasia with lower urinary tract sx      PAST MEDICAL HISTORY: Past medical history from the initial psychiatric evaluation has been reviewed (reviewed/updated 1/28/2018) with no additional updates (I asked patient and no additional past medical history provided). Past Medical History:   Diagnosis Date    BPH (benign prostatic hyperplasia)    No past surgical history on file. SOCIAL HISTORY: Social history from the initial psychiatric evaluation has been reviewed (reviewed/updated 1/28/2018) with no additional updates (I asked patient and no additional social history provided). Social History     Social History    Marital status: SINGLE     Spouse name: N/A    Number of children: N/A    Years of education: N/A     Occupational History    Not on file.      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 No family history on file. REVIEW OF SYSTEMS: (reviewed/updated 1/28/2018)  Appetite:no change from normal   Sleep: does not feel rested and poor with DIMS (difficulty initiating & maintaining sleep)   All other Review of Systems: Psychological ROS: positive for - behavioral disorder, concentration difficulties, disorientation, hostility, memory difficulties and sleep disturbances  Respiratory ROS: no cough, shortness of breath, or wheezing  Cardiovascular ROS: no chest pain or dyspnea on exertion         [facility] (MSE):    MSE FINDINGS ARE WITHIN NORMAL LIMITS (WNL) UNLESS OTHERWISE STATED BELOW. ( ALL OF THE BELOW CATEGORIES OF THE MSE HAVE BEEN REVIEWED (reviewed 1/28/2018) AND UPDATED AS DEEMED APPROPRIATE )  General Presentation age appropriate and disheveled, uncooperative and unreliable   Orientation confused, Alert and Oriented x 1   Vital Signs  See below (reviewed 1/28/2018); Vital Signs (BP, Pulse, & Temp) are within normal limits if not listed below.    Gait and Station Stable/steady, no ataxia   Musculoskeletal System No extrapyramidal symptoms (EPS); no abnormal muscular movements or Tardive Dyskinesia (TD); muscle strength and tone are within normal limits   Language No aphasia or dysarthria   Speech:  monotone and pressured   Thought Processes illogical; slow rate of thoughts; poor abstract reasoning/computation   Thought Associations tangential   Thought Content paranoid delusions, free of hallucinations, confabulation  and internally preoccupied   Suicidal Ideations none   Homicidal Ideations none   Mood:  hostile  and anxious    Affect:  anxious, hostile, increased in intensity and mood-incongruent   Memory recent  impaired   Memory remote:  impaired   Concentration/Attention:  distractable   Fund of Knowledge average   Insight:  poor   Reliability poor   Judgment:  poor          VITALS:     Patient Vitals for the past 24 hrs:   Temp Pulse Resp BP   01/28/18 0615 98 °F (36.7 °C) 95 16 130/83     Wt Readings from Last 3 Encounters:   01/18/18 69.4 kg (153 lb)     Temp Readings from Last 3 Encounters:   01/28/18 98 °F (36.7 °C)     BP Readings from Last 3 Encounters:   01/28/18 130/83     Pulse Readings from Last 3 Encounters:   01/28/18 95            DATA     LABORATORY DATA:(reviewed/updated 1/28/2018)  No results found for this or any previous visit (from the past 24 hour(s)). No results found for: VALF2, VALAC, VALP, VALPR, DS6, CRBAM, CRBAMP, CARB2, XCRBAM  No results found for: LITHM   RADIOLOGY REPORTS:(reviewed/updated 1/28/2018)  No results found.        MEDICATIONS     ALL MEDICATIONS:   Current Facility-Administered Medications   Medication Dose Route Frequency    memantine (NAMENDA) tablet 10 mg  10 mg Oral BID    risperiDONE (RisperDAL) 1 mg/mL oral solution soln 1 mg  1 mg Oral DAILY    risperiDONE (RisperDAL) 1 mg/mL oral solution soln 3 mg  3 mg Oral QHS    mirtazapine (REMERON) tablet 15 mg  15 mg Oral QHS    aspirin chewable tablet 81 mg  81 mg Oral DAILY    bisacodyl (DULCOLAX) suppository 10 mg  10 mg Rectal DAILY PRN    finasteride (PROSCAR) tablet 5 mg  5 mg Oral DAILY    ketoconazole (NIZORAL) 2 % shampoo   Topical DAILY PRN    polyethylene glycol (MIRALAX) packet 17 g  17 g Oral DAILY    senna (SENOKOT) tablet 17.2 mg  2 Tab Oral QHS    tamsulosin (FLOMAX) capsule 0.4 mg  0.4 mg Oral DAILY    triamcinolone acetonide (KENALOG) 0.1 % cream   Topical BID PRN    OLANZapine (ZyPREXA) tablet 2.5 mg  2.5 mg Oral Q6H PRN    ziprasidone (GEODON) 10 mg in sterile water (preservative free) 0.5 mL injection  10 mg IntraMUSCular BID PRN    benztropine (COGENTIN) tablet 1 mg  1 mg Oral BID PRN    benztropine (COGENTIN) injection 1 mg  1 mg IntraMUSCular BID PRN    zolpidem (AMBIEN) tablet 5 mg  5 mg Oral QHS PRN    acetaminophen (TYLENOL) tablet 650 mg  650 mg Oral Q4H PRN    ibuprofen (MOTRIN) tablet 400 mg  400 mg Oral Q8H PRN    magnesium hydroxide (MILK OF MAGNESIA) 400 mg/5 mL oral suspension 30 mL  30 mL Oral DAILY PRN    nicotine (NICODERM CQ) 21 mg/24 hr patch 1 Patch  1 Patch TransDERmal DAILY PRN      SCHEDULED MEDICATIONS:   Current Facility-Administered Medications   Medication Dose Route Frequency    memantine (NAMENDA) tablet 10 mg  10 mg Oral BID    risperiDONE (RisperDAL) 1 mg/mL oral solution soln 1 mg  1 mg Oral DAILY    risperiDONE (RisperDAL) 1 mg/mL oral solution soln 3 mg  3 mg Oral QHS    mirtazapine (REMERON) tablet 15 mg  15 mg Oral QHS    aspirin chewable tablet 81 mg  81 mg Oral DAILY    finasteride (PROSCAR) tablet 5 mg  5 mg Oral DAILY    polyethylene glycol (MIRALAX) packet 17 g  17 g Oral DAILY    senna (SENOKOT) tablet 17.2 mg  2 Tab Oral QHS    tamsulosin (FLOMAX) capsule 0.4 mg  0.4 mg Oral DAILY          ASSESSMENT & PLAN     DIAGNOSES REQUIRING ACTIVE TREATMENT AND MONITORING: (reviewed/updated 1/28/2018)  Patient Active Hospital Problem List:   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 (1/19/2018)    Assessment: slow progress- no physical aggression, affect sl better, poor memory/ attention, wandering but redirectable    Plan: will ct with current med- Namenda, liq Risperdal, Remeron     Agitation (1/19/2018)    Assessment: severe, no aggression but tendency to get loud, scream and curse staff, need several re direction    Plan: prn med        I will continue to monitor blood levels (Depakote---a drug with a narrow therapeutic index= NTI) and associated labs for drug therapy implemented that require intense monitoring for toxicity as deemed appropriate based on current medication side effects and pharmacodynamically determined drug 1/2 lives.     In summary, Katy Ndiaye, is a 76 y.o.  male who presents with a severe exacerbation of the principal diagnosis of 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  Patient's condition is worsening/not improving/not stable. Patient requires continued inpatient hospitalization for further stabilization, safety monitoring and medication management. I will continue to coordinate the provision of individual, milieu, occupational, group, and substance abuse therapies to address target symptoms/diagnoses as deemed appropriate for the individual patient. A coordinated, multidisplinary treatment team round was conducted with the patient (this team consists of the nurse, psychiatric unit pharmcist,  and writer). Complete current electronic health record for patient has been reviewed today including consultant notes, ancillary staff notes, nurses and psychiatric tech notes. Suicide risk assessment completed and patient deemed to be of low risk for suicide at this time. The following regarding medications was addressed during rounds with patient:   the risks and benefits of the proposed medication. The patient was given the opportunity to ask questions. Informed consent given to the use of the above medications. Will continue to adjust psychiatric and non-psychiatric medications (see above \"medication\" section and orders section for details) as deemed appropriate & based upon diagnoses and response to treatment. I will continue to order blood tests/labs and diagnostic tests as deemed appropriate and review results as they become available (see orders for details and above listed lab/test results). I will order psychiatric records from previous [facility] hospitals to further elucidate the nature of patient's psychopathology and review once available. I will gather additional collateral information from friends, family and o/p treatment team to further elucidate the nature of patient's psychopathology and baselline level of psychiatric functioning.          I certify that this patient's inpatient psychiatric hospital services furnished since the previous certification were, and continue to be, required for treatment that could reasonably be expected to improve the patient's condition, or for diagnostic study, and that the patient continues to need, on a daily basis, active treatment furnished directly by or requiring the supervision of inpatient psychiatric facility personnel. In addition, the hospital records show that services furnished were intensive treatment services, admission or related services, or equivalent services.     EXPECTED DISCHARGE DATE/DAY: TBD     DISPOSITION: Home       Signed By:   Bebe Dueñas MD  1/28/2018

## 2018-01-28 NOTE — BH NOTES
Patient was in and out of the room with under wear, required redirection to stay in the room. Slept for 5 hours from 0100 hrs. Q 15 minutes monitoring maintained for the safety and support.

## 2018-01-29 PROCEDURE — 65220000003 HC RM SEMIPRIVATE PSYCH

## 2018-01-29 PROCEDURE — 97116 GAIT TRAINING THERAPY: CPT

## 2018-01-29 PROCEDURE — 74011250637 HC RX REV CODE- 250/637: Performed by: FAMILY MEDICINE

## 2018-01-29 PROCEDURE — 74011250637 HC RX REV CODE- 250/637: Performed by: PSYCHIATRY & NEUROLOGY

## 2018-01-29 RX ORDER — ONDANSETRON 4 MG/1
4 TABLET, ORALLY DISINTEGRATING ORAL
Status: DISCONTINUED | OUTPATIENT
Start: 2018-01-29 | End: 2018-02-06 | Stop reason: HOSPADM

## 2018-01-29 RX ADMIN — MEMANTINE 10 MG: 10 TABLET ORAL at 08:57

## 2018-01-29 RX ADMIN — FINASTERIDE 5 MG: 5 TABLET, FILM COATED ORAL at 08:57

## 2018-01-29 RX ADMIN — TAMSULOSIN HYDROCHLORIDE 0.4 MG: 0.4 CAPSULE ORAL at 08:57

## 2018-01-29 RX ADMIN — MIRTAZAPINE 15 MG: 15 TABLET, FILM COATED ORAL at 21:18

## 2018-01-29 RX ADMIN — MEMANTINE 10 MG: 10 TABLET ORAL at 16:49

## 2018-01-29 RX ADMIN — ONDANSETRON 4 MG: 4 TABLET, ORALLY DISINTEGRATING ORAL at 15:44

## 2018-01-29 RX ADMIN — Medication 3 MG: at 21:17

## 2018-01-29 RX ADMIN — POLYETHYLENE GLYCOL 3350 17 G: 17 POWDER, FOR SOLUTION ORAL at 08:57

## 2018-01-29 RX ADMIN — SENNOSIDES 17.2 MG: 8.6 TABLET, FILM COATED ORAL at 21:18

## 2018-01-29 RX ADMIN — Medication 1 MG: at 08:57

## 2018-01-29 RX ADMIN — ASPIRIN 81 MG: 81 TABLET, CHEWABLE ORAL at 08:57

## 2018-01-29 NOTE — BH NOTES
PSYCHIATRIC PROGRESS NOTE         Patient Name  Libia Glass   Date of Birth 1940   [facility] [id number]   Medical Record Number  [id number]      Age  76 y.o. PCP None   Admit date:  1/18/2018    Room Number  321/01  @ [facility]   Date of Service  1/29/2018           E & M PROGRESS NOTE:         HISTORY       CC:  \" confused , disoriented\"  HISTORY OF PRESENT ILLNESS/INTERVAL HISTORY:  (reviewed/updated 1/29/2018). per initial evaluation: The patient, Libia Glass, is a 76 y.o. WHITE [place]  male with a past psychiatric history significant for dementia, who presents at this time with complaints of (and/or evidence of) the following emotional symptoms: agitation and memory impairment. Additional symptomatology include poor attention, confused, disoriented to time and place. He is a poor historian, distractible, loud and wandering behavior. The above symptoms have been present since last october. These symptoms are of severe severity. These symptoms are constant  in nature. The patient's condition has been precipitated by and psychosocial stressors ( ). Patient's condition made worse by treatment noncompliance. UDS: +benzo; BAL=0. Libia Glass presents/reports/evidences the following emotional symptoms today, 1/29/2018:agitation and paranoid behavior. The above symptoms have been present for few months. These symptoms are of severe severity. The symptoms are constant in nature. Additional symptomatology and features include memory impairment, episodic agitation and poor sleep. Pt has been posturing and threatening staff and received prn med. Wandering behavior but redirectable. 1/21- remains confused , disoriented, disrobing,episodic yelling and screaming, disheveled. Refusing po med  1/22- minimal change in presentation, no aggressive episode. Need prompting and redirection. Wandering, confused  1/23- episodic of screaming, yelling outburst. Pt is redirectable .  Confused and disoriented. Sleep remains poor  1/24- irritable, episodic anger outburst but redirectable. Slept 3 hrs but tend to nap during day time. Reported to be posturing at staff but no physical aggression. Appear to do well in the morning  1/25-affect is sl better. Need redirection but does well. No physical aggression. Sleep remains erratic. Poor STM, need help with care but seek assistance. 1/26- pleasant this am, sl better, no aggression and sleep is erratic. No sig behavior problem and accepting med  1/27-Multiple episodes with loud yelling and screaming, impulsive and difficult to redirect. 1/28- Remains behavioral management challenge due to aggressiveness and dementia. Higher sedation will lead to falls propensity. Staff is doing excellent job in persistently redirecting him. 1/29- has episode of screaming and yelling and need redirection from staff. Behavior problem +. Sleep remains erratic. Memory impairment. SIDE EFFECTS: (reviewed/updated 1/29/2018)  None reported or admitted to. No noted toxicity with use of current med   ALLERGIES:(reviewed/updated 1/29/2018)  Allergies   Allergen Reactions    Erythromycin Hives    Isopropyl Alcohol Hives    Neosporin [Neomycin-Bacitracnzn-Polymyxnb] Rash    Pcn [Penicillins] Rash    Sulfa (Sulfonamide Antibiotics) Rash      MEDICATIONS PRIOR TO ADMISSION:(reviewed/updated 1/29/2018)  Prescriptions Prior to Admission   Medication Sig    risperiDONE (RISPERDAL) 1 mg tablet Take 3 mg by mouth nightly. Indications: Behavioral disturbances associated with dementia    risperiDONE (RISPERDAL) 0.5 mg tablet Take 0.5 mg by mouth two (2) times a day. At 6 AM and 2 PM   Indications: Behavioral disturbances associated with dementia    senna (SENNA) 8.6 mg tablet Take 2 Tabs by mouth nightly. Indications: constipation    triamcinolone acetonide (KENALOG) 0.1 % topical cream Apply  to affected area two (2) times daily as needed for Skin Irritation.  use thin layer    aspirin 81 mg chewable tablet Take 81 mg by mouth daily. Indications: myocardial infarction prevention, prevention of cerebrovascular accident    bisacodyl (DULCOLAX, BISACODYL,) 10 mg suppository Insert 10 mg into rectum daily as needed (Constipation).  finasteride (PROSCAR) 5 mg tablet Take 5 mg by mouth daily. Indications: benign prostatic hyperplasia with lower urinary tract sx    ketoconazole (NIZORAL) 2 % shampoo Apply  to affected area daily as needed for Itching. Patient typically uses 2-3 times/week    ondansetron hcl (ZOFRAN, AS HYDROCHLORIDE,) 4 mg tablet Take 4 mg by mouth every eight (8) hours as needed for Nausea.  polyethylene glycol (MIRALAX) 17 gram packet Take 17 g by mouth daily. Indications: constipation    tamsulosin (FLOMAX) 0.4 mg capsule Take 0.4 mg by mouth daily. Indications: benign prostatic hyperplasia with lower urinary tract sx      PAST MEDICAL HISTORY: Past medical history from the initial psychiatric evaluation has been reviewed (reviewed/updated 1/29/2018) with no additional updates (I asked patient and no additional past medical history provided). Past Medical History:   Diagnosis Date    BPH (benign prostatic hyperplasia)    No past surgical history on file. SOCIAL HISTORY: Social history from the initial psychiatric evaluation has been reviewed (reviewed/updated 1/29/2018) with no additional updates (I asked patient and no additional social history provided). Social History     Social History    Marital status: SINGLE     Spouse name: N/A    Number of children: N/A    Years of education: N/A     Occupational History    Not on file.      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 No family history on file. REVIEW OF SYSTEMS: (reviewed/updated 1/29/2018)  Appetite:no change from normal   Sleep: does not feel rested and poor with DIMS (difficulty initiating & maintaining sleep)   All other Review of Systems: Psychological ROS: positive for - behavioral disorder, concentration difficulties, disorientation, hostility, memory difficulties and sleep disturbances  Respiratory ROS: no cough, shortness of breath, or wheezing  Cardiovascular ROS: no chest pain or dyspnea on exertion         [facility] (MSE):    MSE FINDINGS ARE WITHIN NORMAL LIMITS (WNL) UNLESS OTHERWISE STATED BELOW. ( ALL OF THE BELOW CATEGORIES OF THE MSE HAVE BEEN REVIEWED (reviewed 1/29/2018) AND UPDATED AS DEEMED APPROPRIATE )  General Presentation age appropriate and disheveled, uncooperative and unreliable   Orientation confused, Alert and Oriented x 1   Vital Signs  See below (reviewed 1/29/2018); Vital Signs (BP, Pulse, & Temp) are within normal limits if not listed below.    Gait and Station Stable/steady, no ataxia   Musculoskeletal System No extrapyramidal symptoms (EPS); no abnormal muscular movements or Tardive Dyskinesia (TD); muscle strength and tone are within normal limits   Language No aphasia or dysarthria   Speech:  monotone and pressured   Thought Processes illogical; slow rate of thoughts; poor abstract reasoning/computation   Thought Associations tangential   Thought Content paranoid delusions, free of hallucinations, confabulation  and internally preoccupied   Suicidal Ideations none   Homicidal Ideations none   Mood:  hostile  and anxious    Affect:  anxious, hostile, increased in intensity and mood-incongruent   Memory recent  impaired   Memory remote:  impaired   Concentration/Attention:  distractable   Fund of Knowledge average   Insight:  poor   Reliability poor   Judgment:  poor          VITALS:     Patient Vitals for the past 24 hrs:   Temp Pulse Resp BP   01/29/18 0630 97.7 °F (36.5 °C) (!) 104 18 130/80     Wt Readings from Last 3 Encounters:   01/18/18 69.4 kg (153 lb)     Temp Readings from Last 3 Encounters:   01/29/18 97.7 °F (36.5 °C)     BP Readings from Last 3 Encounters:   01/29/18 130/80     Pulse Readings from Last 3 Encounters:   01/29/18 (!) 104            DATA     LABORATORY DATA:(reviewed/updated 1/29/2018)  No results found for this or any previous visit (from the past 24 hour(s)). No results found for: VALF2, VALAC, VALP, VALPR, DS6, CRBAM, CRBAMP, CARB2, XCRBAM  No results found for: LITHM   RADIOLOGY REPORTS:(reviewed/updated 1/29/2018)  No results found.        MEDICATIONS     ALL MEDICATIONS:   Current Facility-Administered Medications   Medication Dose Route Frequency    memantine (NAMENDA) tablet 10 mg  10 mg Oral BID    risperiDONE (RisperDAL) 1 mg/mL oral solution soln 1 mg  1 mg Oral DAILY    risperiDONE (RisperDAL) 1 mg/mL oral solution soln 3 mg  3 mg Oral QHS    mirtazapine (REMERON) tablet 15 mg  15 mg Oral QHS    aspirin chewable tablet 81 mg  81 mg Oral DAILY    bisacodyl (DULCOLAX) suppository 10 mg  10 mg Rectal DAILY PRN    finasteride (PROSCAR) tablet 5 mg  5 mg Oral DAILY    ketoconazole (NIZORAL) 2 % shampoo   Topical DAILY PRN    polyethylene glycol (MIRALAX) packet 17 g  17 g Oral DAILY    senna (SENOKOT) tablet 17.2 mg  2 Tab Oral QHS    tamsulosin (FLOMAX) capsule 0.4 mg  0.4 mg Oral DAILY    triamcinolone acetonide (KENALOG) 0.1 % cream   Topical BID PRN    OLANZapine (ZyPREXA) tablet 2.5 mg  2.5 mg Oral Q6H PRN    ziprasidone (GEODON) 10 mg in sterile water (preservative free) 0.5 mL injection  10 mg IntraMUSCular BID PRN    benztropine (COGENTIN) tablet 1 mg  1 mg Oral BID PRN    benztropine (COGENTIN) injection 1 mg  1 mg IntraMUSCular BID PRN    zolpidem (AMBIEN) tablet 5 mg  5 mg Oral QHS PRN    acetaminophen (TYLENOL) tablet 650 mg  650 mg Oral Q4H PRN    ibuprofen (MOTRIN) tablet 400 mg  400 mg Oral Q8H PRN    magnesium hydroxide (MILK OF MAGNESIA) 400 mg/5 mL oral suspension 30 mL  30 mL Oral DAILY PRN    nicotine (NICODERM CQ) 21 mg/24 hr patch 1 Patch  1 Patch TransDERmal DAILY PRN      SCHEDULED MEDICATIONS:   Current Facility-Administered Medications   Medication Dose Route Frequency    memantine (NAMENDA) tablet 10 mg  10 mg Oral BID    risperiDONE (RisperDAL) 1 mg/mL oral solution soln 1 mg  1 mg Oral DAILY    risperiDONE (RisperDAL) 1 mg/mL oral solution soln 3 mg  3 mg Oral QHS    mirtazapine (REMERON) tablet 15 mg  15 mg Oral QHS    aspirin chewable tablet 81 mg  81 mg Oral DAILY    finasteride (PROSCAR) tablet 5 mg  5 mg Oral DAILY    polyethylene glycol (MIRALAX) packet 17 g  17 g Oral DAILY    senna (SENOKOT) tablet 17.2 mg  2 Tab Oral QHS    tamsulosin (FLOMAX) capsule 0.4 mg  0.4 mg Oral DAILY          ASSESSMENT & PLAN     DIAGNOSES REQUIRING ACTIVE TREATMENT AND MONITORING: (reviewed/updated 1/29/2018)  Patient Active Hospital Problem List:   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 (1/19/2018)    Assessment: minimal change- poor memory, behavior problem+    Plan: will ct with current med- Namenda, liq Risperdal, Remeron.  Consider changing AP if no change in behavior     Agitation (1/19/2018)    Assessment: severe, no aggression but tendency to get loud, scream and curse staff, need several re direction    Plan: prn med        I will continue to monitor blood levels (Depakote---a drug with a narrow therapeutic index= NTI) and associated labs for drug therapy implemented that require intense monitoring for toxicity as deemed appropriate based on current medication side effects and pharmacodynamically determined drug 1/2 lives. In summary, Katy Ndiaye, is a 76 y.o.  male who presents with a severe exacerbation of the principal diagnosis of 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  Patient's condition is worsening/not improving/not stable. Patient requires continued inpatient hospitalization for further stabilization, safety monitoring and medication management. I will continue to coordinate the provision of individual, milieu, occupational, group, and substance abuse therapies to address target symptoms/diagnoses as deemed appropriate for the individual patient. A coordinated, multidisplinary treatment team round was conducted with the patient (this team consists of the nurse, psychiatric unit pharmcist,  and writer). Complete current electronic health record for patient has been reviewed today including consultant notes, ancillary staff notes, nurses and psychiatric tech notes. Suicide risk assessment completed and patient deemed to be of low risk for suicide at this time. The following regarding medications was addressed during rounds with patient:   the risks and benefits of the proposed medication. The patient was given the opportunity to ask questions. Informed consent given to the use of the above medications. Will continue to adjust psychiatric and non-psychiatric medications (see above \"medication\" section and orders section for details) as deemed appropriate & based upon diagnoses and response to treatment. I will continue to order blood tests/labs and diagnostic tests as deemed appropriate and review results as they become available (see orders for details and above listed lab/test results). I will order psychiatric records from previous [facility] hospitals to further elucidate the nature of patient's psychopathology and review once available.     I will gather additional collateral information from friends, family and o/p treatment team to further elucidate the nature of patient's psychopathology and baselline level of psychiatric functioning. I certify that this patient's inpatient psychiatric hospital services furnished since the previous certification were, and continue to be, required for treatment that could reasonably be expected to improve the patient's condition, or for diagnostic study, and that the patient continues to need, on a daily basis, active treatment furnished directly by or requiring the supervision of inpatient psychiatric facility personnel. In addition, the hospital records show that services furnished were intensive treatment services, admission or related services, or equivalent services.     EXPECTED DISCHARGE DATE/DAY: TBD     DISPOSITION: Home       Signed By:   Katt Jiménez MD  1/29/2018

## 2018-01-29 NOTE — PROGRESS NOTES
Problem: Altered Thought Process (Adult/Pediatric)  Goal: *STG: Demonstrates ability to understand and use improved judgment in daily activities and relationships  Outcome: Progressing Towards Goal  Pt is alert/oriented to person and place. Pt had less anger outbursts on this shift. He is not attending groups. Mood is angry. Meds/meal compliant. No PRNs given this shift. No behavioral issues this shift. Denies SI/Hi and denies A/V hallucinations. Will continue to monitor pt with q15 min rounds for safety.

## 2018-01-29 NOTE — BH NOTES
Social Work     Clinician met with Maya Corbin the pt's caregiver in his independent living apartment. We discussed the pt's progress, medications and discharge plans. The concerns of the caregivers are to ensure they could manage the pt's behaviors at night. The pt is currently on the waiting list for [facility]. The family and caregivers realize that the pt will eventually need placement in a memory care unit. Pt will follow-up with [facility].

## 2018-01-29 NOTE — PROGRESS NOTES
Problem: Mobility Impaired (Adult and Pediatric)  Goal: *Acute Goals and Plan of Care (Insert Text)  Physical Therapy Goals  Initiated 1/23/2018  1. Patient will move from supine to sit and sit to supine , scoot up and down and roll side to side in bed with independence within 7 day(s). 2.  Patient will transfer from bed to chair and chair to bed with independence using the least restrictive device within 7 day(s). 3.  Patient will perform sit to stand with independence within 7 day(s). 4.  Patient will ambulate with modified independence for 300 feet with the least restrictive device within 7 day(s). physical Therapy TREATMENT  Seen 1140 to 1155  Patient: Kian Baca (82 y.o. male)  Date: 1/29/2018  Diagnosis: Alzheimers Disease  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       Precautions: Fall  Chart, physical therapy assessment, plan of care and goals were reviewed. ASSESSMENT:  Pt presents with decreased activity tolerance and safety awareness, his mobility is likely close to his baseline. He is mod independent for bed mobility and SBA to ambulate 125' with his rollator. He walks with flexed and rounded shoulder posture, vc's to lift his head and keep the walker close. Will follw a couple of visits to encourage mobility. Progression toward goals:  [x]    Improving appropriately and progressing toward goals  []    Improving slowly and progressing toward goals  []    Not making progress toward goals and plan of care will be adjusted     PLAN:  Patient continues to benefit from skilled intervention to address the above impairments. Continue treatment per established plan of care. Discharge Recommendations:  [facility]  Further Equipment Recommendations for Discharge:  none     SUBJECTIVE:   Patient stated I'l walk with you.     OBJECTIVE DATA SUMMARY:   Critical Behavior:  Neurologic State: Confused  Orientation Level: Oriented to person        Functional Mobility Training:  Bed Mobility:  Supine to Sit: Independent  Transfers:  Sit to Stand: Modified independent  Stand to Sit: Modified independent  Balance:  Sitting: Intact  Standing: Intact; With support  Ambulation/Gait Training:  Distance (ft): 125 Feet (ft)  Assistive Device: Walker, rollator  Ambulation - Level of Assistance: Stand-by asssistance  Gait Abnormalities: Shuffling gait  Base of Support: Narrowed  Speed/Bety: Shuffled  Step Length: Left shortened;Right shortened  Activity Tolerance: Tolerated well  Please refer to the flowsheet for vital signs taken during this treatment.   After treatment:   [x]    Patient left in no apparent distress sitting up in chair  []    Patient left in no apparent distress in bed  [x]    Call bell left within reach  []    Nursing notified  []    Caregiver present  []    Bed alarm activated    COMMUNICATION/COLLABORATION:   The patients plan of care was discussed with: Registered Nurse    Nader Melendez PT

## 2018-01-29 NOTE — BH NOTES
Pt remain isolative to room, complaint with meals and med's and his room. Pt stuart, yells and lashes out at staff when attempting to help him. Pt irritated at times, easily redirectable, when speaking softly to him. Pt remain isolative at meal times, no interaction with peers or staff. Pt remain on Q 15 min checks for safety, will monitor and offer support when needed.

## 2018-01-29 NOTE — PROGRESS NOTES
Problem: Altered Thought Process (Adult/Pediatric)  Goal: *STG: Remains safe in hospital  Outcome: Progressing Towards Goal  Pt slept 1 hour. Pt had uneventful night and required no PRN's. Pt had no complaints or signs of distress throughout night. Respirations were unlabored. Continuing to monitor with q15 min rounds for safety.

## 2018-01-30 ENCOUNTER — APPOINTMENT (OUTPATIENT)
Dept: CT IMAGING | Age: 78
DRG: 057 | End: 2018-01-30
Attending: PSYCHIATRY & NEUROLOGY
Payer: MEDICARE

## 2018-01-30 LAB
GLUCOSE BLD STRIP.AUTO-MCNC: 131 MG/DL (ref 65–100)
SERVICE CMNT-IMP: ABNORMAL

## 2018-01-30 PROCEDURE — 82962 GLUCOSE BLOOD TEST: CPT

## 2018-01-30 PROCEDURE — 65220000003 HC RM SEMIPRIVATE PSYCH

## 2018-01-30 PROCEDURE — 74011250637 HC RX REV CODE- 250/637: Performed by: PSYCHIATRY & NEUROLOGY

## 2018-01-30 PROCEDURE — 70450 CT HEAD/BRAIN W/O DYE: CPT

## 2018-01-30 RX ADMIN — ASPIRIN 81 MG: 81 TABLET, CHEWABLE ORAL at 09:06

## 2018-01-30 RX ADMIN — MEMANTINE 10 MG: 10 TABLET ORAL at 09:06

## 2018-01-30 RX ADMIN — SENNOSIDES 17.2 MG: 8.6 TABLET, FILM COATED ORAL at 21:21

## 2018-01-30 RX ADMIN — FINASTERIDE 5 MG: 5 TABLET, FILM COATED ORAL at 09:06

## 2018-01-30 RX ADMIN — POLYETHYLENE GLYCOL 3350 17 G: 17 POWDER, FOR SOLUTION ORAL at 09:06

## 2018-01-30 RX ADMIN — MIRTAZAPINE 15 MG: 15 TABLET, FILM COATED ORAL at 21:21

## 2018-01-30 RX ADMIN — Medication 3 MG: at 21:21

## 2018-01-30 RX ADMIN — Medication 1 MG: at 09:06

## 2018-01-30 RX ADMIN — MEMANTINE 10 MG: 10 TABLET ORAL at 17:25

## 2018-01-30 RX ADMIN — TAMSULOSIN HYDROCHLORIDE 0.4 MG: 0.4 CAPSULE ORAL at 09:06

## 2018-01-30 NOTE — BH NOTES
Dr. Wilhelm Gleason office returned call. Informed the  of the patient's weakness, VS, and mental status. Patient was unable to assist in transfer to wheelchair and stretcher. Patient was more alert after the completion of the cat scan. Patient was given his lunch. He is presently eating it without assistance.

## 2018-01-30 NOTE — BH NOTES
PSYCHIATRIC PROGRESS NOTE         Patient Name  Joellen Ward   Date of Birth 1940   [facility] [id number]   Medical Record Number  [id number]      Age  76 y.o. PCP None   Admit date:  1/18/2018    Room Number  321/01  @ [facility]   Date of Service  1/30/2018           E & M PROGRESS NOTE:         HISTORY       CC:  \" confused , disoriented\"  HISTORY OF PRESENT ILLNESS/INTERVAL HISTORY:  (reviewed/updated 1/30/2018). per initial evaluation: The patient, Joellen Ward, is a 76 y.o. WHITE [place]  male with a past psychiatric history significant for dementia, who presents at this time with complaints of (and/or evidence of) the following emotional symptoms: agitation and memory impairment. Additional symptomatology include poor attention, confused, disoriented to time and place. He is a poor historian, distractible, loud and wandering behavior. The above symptoms have been present since last october. These symptoms are of severe severity. These symptoms are constant  in nature. The patient's condition has been precipitated by and psychosocial stressors ( ). Patient's condition made worse by treatment noncompliance. UDS: +benzo; BAL=0. Joellen Ward presents/reports/evidences the following emotional symptoms today, 1/30/2018:agitation and paranoid behavior. The above symptoms have been present for few months. These symptoms are of severe severity. The symptoms are constant in nature. Additional symptomatology and features include memory impairment, episodic agitation and poor sleep. Pt has been posturing and threatening staff and received prn med. Wandering behavior but redirectable. 1/21- remains confused , disoriented, disrobing,episodic yelling and screaming, disheveled. Refusing po med  1/22- minimal change in presentation, no aggressive episode. Need prompting and redirection. Wandering, confused  1/23- episodic of screaming, yelling outburst. Pt is redirectable .  Confused and disoriented. Sleep remains poor  1/24- irritable, episodic anger outburst but redirectable. Slept 3 hrs but tend to nap during day time. Reported to be posturing at staff but no physical aggression. Appear to do well in the morning  1/25-affect is sl better. Need redirection but does well. No physical aggression. Sleep remains erratic. Poor STM, need help with care but seek assistance. 1/26- pleasant this am, sl better, no aggression and sleep is erratic. No sig behavior problem and accepting med  1/27-Multiple episodes with loud yelling and screaming, impulsive and difficult to redirect. 1/28- Remains behavioral management challenge due to aggressiveness and dementia. Higher sedation will lead to falls propensity. Staff is doing excellent job in persistently redirecting him. 1/29- has episode of screaming and yelling and need redirection from staff. Behavior problem +. Sleep remains erratic. Memory impairment. 1/30- slept 4 hrs, likes to take a nap during morning. No agitation. Affect is sl better. Episodic screaming and yelling but redirectable. SIDE EFFECTS: (reviewed/updated 1/30/2018)  None reported or admitted to. No noted toxicity with use of current med   ALLERGIES:(reviewed/updated 1/30/2018)  Allergies   Allergen Reactions    Erythromycin Hives    Isopropyl Alcohol Hives    Neosporin [Neomycin-Bacitracnzn-Polymyxnb] Rash    Pcn [Penicillins] Rash    Sulfa (Sulfonamide Antibiotics) Rash      MEDICATIONS PRIOR TO ADMISSION:(reviewed/updated 1/30/2018)  Prescriptions Prior to Admission   Medication Sig    risperiDONE (RISPERDAL) 1 mg tablet Take 3 mg by mouth nightly. Indications: Behavioral disturbances associated with dementia    risperiDONE (RISPERDAL) 0.5 mg tablet Take 0.5 mg by mouth two (2) times a day. At 6 AM and 2 PM   Indications: Behavioral disturbances associated with dementia    senna (SENNA) 8.6 mg tablet Take 2 Tabs by mouth nightly.  Indications: constipation    triamcinolone acetonide (KENALOG) 0.1 % topical cream Apply  to affected area two (2) times daily as needed for Skin Irritation. use thin layer    aspirin 81 mg chewable tablet Take 81 mg by mouth daily. Indications: myocardial infarction prevention, prevention of cerebrovascular accident    bisacodyl (DULCOLAX, BISACODYL,) 10 mg suppository Insert 10 mg into rectum daily as needed (Constipation).  finasteride (PROSCAR) 5 mg tablet Take 5 mg by mouth daily. Indications: benign prostatic hyperplasia with lower urinary tract sx    ketoconazole (NIZORAL) 2 % shampoo Apply  to affected area daily as needed for Itching. Patient typically uses 2-3 times/week    ondansetron hcl (ZOFRAN, AS HYDROCHLORIDE,) 4 mg tablet Take 4 mg by mouth every eight (8) hours as needed for Nausea.  polyethylene glycol (MIRALAX) 17 gram packet Take 17 g by mouth daily. Indications: constipation    tamsulosin (FLOMAX) 0.4 mg capsule Take 0.4 mg by mouth daily. Indications: benign prostatic hyperplasia with lower urinary tract sx      PAST MEDICAL HISTORY: Past medical history from the initial psychiatric evaluation has been reviewed (reviewed/updated 1/30/2018) with no additional updates (I asked patient and no additional past medical history provided). Past Medical History:   Diagnosis Date    BPH (benign prostatic hyperplasia)    No past surgical history on file. SOCIAL HISTORY: Social history from the initial psychiatric evaluation has been reviewed (reviewed/updated 1/30/2018) with no additional updates (I asked patient and no additional social history provided). Social History     Social History    Marital status: SINGLE     Spouse name: N/A    Number of children: N/A    Years of education: N/A     Occupational History    Not on file.      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 No family history on file. REVIEW OF SYSTEMS: (reviewed/updated 1/30/2018)  Appetite:no change from normal   Sleep: does not feel rested and poor with DIMS (difficulty initiating & maintaining sleep)   All other Review of Systems: Psychological ROS: positive for - behavioral disorder, concentration difficulties, disorientation, hostility, memory difficulties and sleep disturbances  Respiratory ROS: no cough, shortness of breath, or wheezing  Cardiovascular ROS: no chest pain or dyspnea on exertion         [facility] (MSE):    MSE FINDINGS ARE WITHIN NORMAL LIMITS (WNL) UNLESS OTHERWISE STATED BELOW. ( ALL OF THE BELOW CATEGORIES OF THE MSE HAVE BEEN REVIEWED (reviewed 1/30/2018) AND UPDATED AS DEEMED APPROPRIATE )  General Presentation age appropriate and disheveled, uncooperative and unreliable   Orientation confused, Alert and Oriented x 1   Vital Signs  See below (reviewed 1/30/2018); Vital Signs (BP, Pulse, & Temp) are within normal limits if not listed below.    Gait and Station Stable/steady, no ataxia   Musculoskeletal System No extrapyramidal symptoms (EPS); no abnormal muscular movements or Tardive Dyskinesia (TD); muscle strength and tone are within normal limits   Language No aphasia or dysarthria   Speech:  monotone and pressured   Thought Processes illogical; slow rate of thoughts; poor abstract reasoning/computation   Thought Associations tangential   Thought Content paranoid delusions, free of hallucinations, confabulation and internally preoccupied   Suicidal Ideations none   Homicidal Ideations none   Mood:  hostile  and anxious    Affect:  anxious, hostile, increased in intensity and mood-incongruent   Memory recent  impaired   Memory remote:  impaired   Concentration/Attention:  distractable   Fund of Knowledge average   Insight:  poor   Reliability poor   Judgment:  poor          VITALS:     No data found. Wt Readings from Last 3 Encounters:   01/18/18 69.4 kg (153 lb)     Temp Readings from Last 3 Encounters:   No data found for Temp     BP Readings from Last 3 Encounters:   01/29/18 130/80     Pulse Readings from Last 3 Encounters:   01/29/18 (!) 104            DATA     LABORATORY DATA:(reviewed/updated 1/30/2018)  No results found for this or any previous visit (from the past 24 hour(s)). No results found for: VALF2, VALAC, VALP, VALPR, DS6, CRBAM, CRBAMP, CARB2, XCRBAM  No results found for: LITHM   RADIOLOGY REPORTS:(reviewed/updated 1/30/2018)  No results found.        MEDICATIONS     ALL MEDICATIONS:   Current Facility-Administered Medications   Medication Dose Route Frequency    ondansetron (ZOFRAN ODT) tablet 4 mg  4 mg Oral Q8H PRN    memantine (NAMENDA) tablet 10 mg  10 mg Oral BID    risperiDONE (RisperDAL) 1 mg/mL oral solution soln 1 mg  1 mg Oral DAILY    risperiDONE (RisperDAL) 1 mg/mL oral solution soln 3 mg  3 mg Oral QHS    mirtazapine (REMERON) tablet 15 mg  15 mg Oral QHS    aspirin chewable tablet 81 mg  81 mg Oral DAILY    bisacodyl (DULCOLAX) suppository 10 mg  10 mg Rectal DAILY PRN    finasteride (PROSCAR) tablet 5 mg  5 mg Oral DAILY    ketoconazole (NIZORAL) 2 % shampoo   Topical DAILY PRN    polyethylene glycol (MIRALAX) packet 17 g  17 g Oral DAILY    senna (SENOKOT) tablet 17.2 mg  2 Tab Oral QHS    tamsulosin (FLOMAX) capsule 0.4 mg  0.4 mg Oral DAILY    triamcinolone acetonide (KENALOG) 0.1 % cream   Topical BID PRN    OLANZapine (ZyPREXA) tablet 2.5 mg  2.5 mg Oral Q6H PRN    ziprasidone (GEODON) 10 mg in sterile water (preservative free) 0.5 mL injection  10 mg IntraMUSCular BID PRN    benztropine (COGENTIN) tablet 1 mg  1 mg Oral BID PRN    benztropine (COGENTIN) injection 1 mg  1 mg IntraMUSCular BID PRN    zolpidem (AMBIEN) tablet 5 mg  5 mg Oral QHS PRN    acetaminophen (TYLENOL) tablet 650 mg  650 mg Oral Q4H PRN    ibuprofen (MOTRIN) tablet 400 mg  400 mg Oral Q8H PRN    magnesium hydroxide (MILK OF MAGNESIA) 400 mg/5 mL oral suspension 30 mL  30 mL Oral DAILY PRN    nicotine (NICODERM CQ) 21 mg/24 hr patch 1 Patch  1 Patch TransDERmal DAILY PRN      SCHEDULED MEDICATIONS:   Current Facility-Administered Medications   Medication Dose Route Frequency    memantine (NAMENDA) tablet 10 mg  10 mg Oral BID    risperiDONE (RisperDAL) 1 mg/mL oral solution soln 1 mg  1 mg Oral DAILY    risperiDONE (RisperDAL) 1 mg/mL oral solution soln 3 mg  3 mg Oral QHS    mirtazapine (REMERON) tablet 15 mg  15 mg Oral QHS    aspirin chewable tablet 81 mg  81 mg Oral DAILY    finasteride (PROSCAR) tablet 5 mg  5 mg Oral DAILY    polyethylene glycol (MIRALAX) packet 17 g  17 g Oral DAILY    senna (SENOKOT) tablet 17.2 mg  2 Tab Oral QHS    tamsulosin (FLOMAX) capsule 0.4 mg  0.4 mg Oral DAILY          ASSESSMENT & PLAN     DIAGNOSES REQUIRING ACTIVE TREATMENT AND MONITORING: (reviewed/updated 1/30/2018)  Patient Active Hospital Problem List:   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 (1/19/2018)    Assessment: minimal change- poor memory, behavior problem- less frequent    Plan: will ct with current med- Namenda, liq Risperdal, Remeron.  Consider changing AP if no change in behavior     Agitation (1/19/2018)    Assessment: severe, no aggression but tendency to get loud, scream and curse staff, need several re direction    Plan: prn med        I will continue to monitor blood levels (Depakote---a drug with a narrow therapeutic index= NTI) and associated labs for drug therapy implemented that require intense monitoring for toxicity as deemed appropriate based on current medication side effects and pharmacodynamically determined drug 1/2 lives. In summary, Kian Baca, is a 76 y.o.  male who presents with a severe exacerbation of the principal diagnosis of 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  Patient's condition is worsening/not improving/not stable. Patient requires continued inpatient hospitalization for further stabilization, safety monitoring and medication management. I will continue to coordinate the provision of individual, milieu, occupational, group, and substance abuse therapies to address target symptoms/diagnoses as deemed appropriate for the individual patient. A coordinated, multidisplinary treatment team round was conducted with the patient (this team consists of the nurse, psychiatric unit pharmcist,  and writer). Complete current electronic health record for patient has been reviewed today including consultant notes, ancillary staff notes, nurses and psychiatric tech notes. Suicide risk assessment completed and patient deemed to be of low risk for suicide at this time. The following regarding medications was addressed during rounds with patient:   the risks and benefits of the proposed medication. The patient was given the opportunity to ask questions. Informed consent given to the use of the above medications. Will continue to adjust psychiatric and non-psychiatric medications (see above \"medication\" section and orders section for details) as deemed appropriate & based upon diagnoses and response to treatment. I will continue to order blood tests/labs and diagnostic tests as deemed appropriate and review results as they become available (see orders for details and above listed lab/test results).     I will order psychiatric records from previous [facility] to further elucidate the nature of patient's psychopathology and review once available. I will gather additional collateral information from friends, family and o/p treatment team to further elucidate the nature of patient's psychopathology and baselline level of psychiatric functioning. I certify that this patient's inpatient psychiatric hospital services furnished since the previous certification were, and continue to be, required for treatment that could reasonably be expected to improve the patient's condition, or for diagnostic study, and that the patient continues to need, on a daily basis, active treatment furnished directly by or requiring the supervision of inpatient psychiatric facility personnel. In addition, the hospital records show that services furnished were intensive treatment services, admission or related services, or equivalent services.     EXPECTED DISCHARGE DATE/DAY: TBD     DISPOSITION: Home       Signed By:   Adilia Ewing MD  1/30/2018

## 2018-01-30 NOTE — BH NOTES
Patient alert and oriented at baseline. Continues to be confused at times. Isolative to room. Med and meal compliant. Denies pain/discomfort. Denies hallucinations/delusions. Continues on Q15 min safety checks. Will continue to monitor and continue plan of care.

## 2018-01-30 NOTE — BH NOTES
Patient was found on hands and knees in the bathroom. When asked what happened, patient stated he fell. Patient was able to get to bed and assist in pulling up from the floor. Patient indicated he did not hit his head, nor injure himself. Writer noted a reddened area on his left knee. No other scratches, bruises, or cuts noted. Patient was given a total bed bath. Writer noted patient kept eyes closed and needed to have name called several times to assist with turns. When patient was asked to sit up on side of bed, he had difficulty staying in an upright position, which he could normally do. Patient could not stand, even with assistance. Patient was assisted back in bed and vitals were obtained. Patient is oriented to self and place (hospital.) Patient continues to dose off while writer carries out assessment. Blood glucose obtained; reading was 131. Dr. Danny Mo was notified. Dr. Isaias Webb was paged.

## 2018-01-30 NOTE — BH NOTES
Pages[facility] Doctor Minerva again. Patient's pupils are reactive to light. Patient was able to squeeze my hands, the left  is weak, but the right  is strong. Patient able to push hand with both LEs with equal strength. No facial weakness noted. Patient is able to raise his arms for BP cuff to place on his arm. Dr. Norberto Rodrigues called for order for CT head.

## 2018-01-30 NOTE — BH NOTES
Pt slept 4 hours. Pt had uneventful night and required no PRN's. Pt had no complaints or signs of distress throughout night. Respirations were unlabored. Continuing to monitor with q15 min rounds for safety.

## 2018-01-30 NOTE — BH NOTES
REFLECTIONS GROUP THERAPY PROGRESS NOTE    The patient Rumae Score is participating in Reflections Group Therapy. Group time: 30 minutes    Personal goal for participation: Share with group about feelings and concerns throughout the course of the day. Goal orientation: personal    Group therapy participation: active    Therapeutic interventions reviewed and discussed: Yes    Impression of participation:   Positive input.     Zohra Barillas  1/29/2018

## 2018-01-31 PROCEDURE — 74011250637 HC RX REV CODE- 250/637: Performed by: PSYCHIATRY & NEUROLOGY

## 2018-01-31 PROCEDURE — 93005 ELECTROCARDIOGRAM TRACING: CPT

## 2018-01-31 PROCEDURE — 65220000003 HC RM SEMIPRIVATE PSYCH

## 2018-01-31 RX ADMIN — Medication 1 MG: at 09:32

## 2018-01-31 RX ADMIN — FINASTERIDE 5 MG: 5 TABLET, FILM COATED ORAL at 09:32

## 2018-01-31 RX ADMIN — MIRTAZAPINE 15 MG: 15 TABLET, FILM COATED ORAL at 22:09

## 2018-01-31 RX ADMIN — TAMSULOSIN HYDROCHLORIDE 0.4 MG: 0.4 CAPSULE ORAL at 09:32

## 2018-01-31 RX ADMIN — MEMANTINE 10 MG: 10 TABLET ORAL at 17:40

## 2018-01-31 RX ADMIN — ASPIRIN 81 MG: 81 TABLET, CHEWABLE ORAL at 09:32

## 2018-01-31 RX ADMIN — Medication 3 MG: at 22:09

## 2018-01-31 RX ADMIN — MEMANTINE 10 MG: 10 TABLET ORAL at 09:32

## 2018-01-31 RX ADMIN — SENNOSIDES 17.2 MG: 8.6 TABLET, FILM COATED ORAL at 22:09

## 2018-01-31 RX ADMIN — MAGNESIUM HYDROXIDE 30 ML: 400 SUSPENSION ORAL at 00:26

## 2018-01-31 RX ADMIN — POLYETHYLENE GLYCOL 3350 17 G: 17 POWDER, FOR SOLUTION ORAL at 09:31

## 2018-01-31 NOTE — BH NOTES
Pt visible on the unit earlier this shift, meals and meds complaint. Pt gait unsteady, slow moving and noted getting tied quickly. Pt less irritated and agitated when approached by staff. Pt sat quiet in day room with his head down, no interaction with peers noted. Pt continue to sit until lunch, returned to his room and observed resting in bed with no problem. Pt remain on Q 15 min checks for safety, will monitor and offer support when needed.

## 2018-01-31 NOTE — BH NOTES
PSYCHIATRIC PROGRESS NOTE         Patient Name  Caden Banegas   Date of Birth 1940   [facility] [id number]   Medical Record Number  [id number]      Age  76 y.o. PCP None   Admit date:  1/18/2018    Room Number  321/01  @ [facility]   Date of Service  1/31/2018           E & M PROGRESS NOTE:         HISTORY       CC:  \" confused , disoriented\"  HISTORY OF PRESENT ILLNESS/INTERVAL HISTORY:  (reviewed/updated 1/31/2018). per initial evaluation: The patient, Caden Banegas, is a 76 y.o. WHITE [place]  male with a past psychiatric history significant for dementia, who presents at this time with complaints of (and/or evidence of) the following emotional symptoms: agitation and memory impairment. Additional symptomatology include poor attention, confused, disoriented to time and place. He is a poor historian, distractible, loud and wandering behavior. The above symptoms have been present since last october. These symptoms are of severe severity. These symptoms are constant  in nature. The patient's condition has been precipitated by and psychosocial stressors ( ). Patient's condition made worse by treatment noncompliance. UDS: +benzo; BAL=0. Caden Banegas presents/reports/evidences the following emotional symptoms today, 1/31/2018:agitation and paranoid behavior. The above symptoms have been present for few months. These symptoms are of severe severity. The symptoms are constant in nature. Additional symptomatology and features include memory impairment, episodic agitation and poor sleep. Pt has been posturing and threatening staff and received prn med. Wandering behavior but redirectable. 1/21- remains confused , disoriented, disrobing,episodic yelling and screaming, disheveled. Refusing po med  1/22- minimal change in presentation, no aggressive episode. Need prompting and redirection. Wandering, confused  1/23- episodic of screaming, yelling outburst. Pt is redirectable .  Confused and disoriented. Sleep remains poor  1/24- irritable, episodic anger outburst but redirectable. Slept 3 hrs but tend to nap during day time. Reported to be posturing at staff but no physical aggression. Appear to do well in the morning  1/25-affect is sl better. Need redirection but does well. No physical aggression. Sleep remains erratic. Poor STM, need help with care but seek assistance. 1/26- pleasant this am, sl better, no aggression and sleep is erratic. No sig behavior problem and accepting med  1/27-Multiple episodes with loud yelling and screaming, impulsive and difficult to redirect. 1/28- Remains behavioral management challenge due to aggressiveness and dementia. Higher sedation will lead to falls propensity. Staff is doing excellent job in persistently redirecting him. 1/29- has episode of screaming and yelling and need redirection from staff. Behavior problem +. Sleep remains erratic. Memory impairment. 1/30- slept 4 hrs, likes to take a nap during morning. No agitation. Affect is sl better. Episodic screaming and yelling but redirectable. 1/31- feeling lethargic, ?weakness and unsteady gait, CT Head done to r/o AMS- no acute changes. Remains confused  with poor STM. No agitation and accepting med. Need redirection      SIDE EFFECTS: (reviewed/updated 1/31/2018)  None reported or admitted to. No noted toxicity with use of current med   ALLERGIES:(reviewed/updated 1/31/2018)  Allergies   Allergen Reactions    Erythromycin Hives    Isopropyl Alcohol Hives    Neosporin [Neomycin-Bacitracnzn-Polymyxnb] Rash    Pcn [Penicillins] Rash    Sulfa (Sulfonamide Antibiotics) Rash      MEDICATIONS PRIOR TO ADMISSION:(reviewed/updated 1/31/2018)  Prescriptions Prior to Admission   Medication Sig    risperiDONE (RISPERDAL) 1 mg tablet Take 3 mg by mouth nightly. Indications: Behavioral disturbances associated with dementia    risperiDONE (RISPERDAL) 0.5 mg tablet Take 0.5 mg by mouth two (2) times a day.  At 6 AM and 2 PM   Indications: Behavioral disturbances associated with dementia    senna (SENNA) 8.6 mg tablet Take 2 Tabs by mouth nightly. Indications: constipation    triamcinolone acetonide (KENALOG) 0.1 % topical cream Apply  to affected area two (2) times daily as needed for Skin Irritation. use thin layer    aspirin 81 mg chewable tablet Take 81 mg by mouth daily. Indications: myocardial infarction prevention, prevention of cerebrovascular accident    bisacodyl (DULCOLAX, BISACODYL,) 10 mg suppository Insert 10 mg into rectum daily as needed (Constipation).  finasteride (PROSCAR) 5 mg tablet Take 5 mg by mouth daily. Indications: benign prostatic hyperplasia with lower urinary tract sx    ketoconazole (NIZORAL) 2 % shampoo Apply  to affected area daily as needed for Itching. Patient typically uses 2-3 times/week    ondansetron hcl (ZOFRAN, AS HYDROCHLORIDE,) 4 mg tablet Take 4 mg by mouth every eight (8) hours as needed for Nausea.  polyethylene glycol (MIRALAX) 17 gram packet Take 17 g by mouth daily. Indications: constipation    tamsulosin (FLOMAX) 0.4 mg capsule Take 0.4 mg by mouth daily. Indications: benign prostatic hyperplasia with lower urinary tract sx      PAST MEDICAL HISTORY: Past medical history from the initial psychiatric evaluation has been reviewed (reviewed/updated 1/31/2018) with no additional updates (I asked patient and no additional past medical history provided). Past Medical History:   Diagnosis Date    BPH (benign prostatic hyperplasia)    No past surgical history on file. SOCIAL HISTORY: Social history from the initial psychiatric evaluation has been reviewed (reviewed/updated 1/31/2018) with no additional updates (I asked patient and no additional social history provided). Social History     Social History    Marital status: SINGLE     Spouse name: N/A    Number of children: N/A    Years of education: N/A     Occupational History    Not on file.      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 No family history on file. REVIEW OF SYSTEMS: (reviewed/updated 1/31/2018)  Appetite:no change from normal   Sleep: does not feel rested and poor with DIMS (difficulty initiating & maintaining sleep)   All other Review of Systems: Psychological ROS: positive for - behavioral disorder, concentration difficulties, disorientation, hostility, memory difficulties and sleep disturbances  Respiratory ROS: no cough, shortness of breath, or wheezing  Cardiovascular ROS: no chest pain or dyspnea on exertion         [facility] [place]    [facility] [place] WITHIN NORMAL LIMITS (WNL) UNLESS OTHERWISE STATED BELOW. ( ALL OF THE BELOW CATEGORIES OF THE MSE HAVE BEEN REVIEWED (reviewed 1/31/2018) AND UPDATED AS DEEMED APPROPRIATE )  General Presentation age appropriate and disheveled, uncooperative and unreliable   Orientation confused, Alert and Oriented x 1   Vital Signs  See below (reviewed 1/31/2018); Vital Signs (BP, Pulse, & Temp) are within normal limits if not listed below.    Gait and Station Stable/steady, no ataxia   Musculoskeletal System No extrapyramidal symptoms (EPS); no abnormal muscular movements or Tardive Dyskinesia (TD); muscle strength and tone are within normal limits   Language No aphasia or dysarthria   Speech:  monotone and pressured   Thought Processes illogical; slow rate of thoughts; poor abstract reasoning/computation   Thought Associations tangential   Thought Content Free of delusion, free of hallucination   Suicidal Ideations none   Homicidal Ideations none   Mood:  anxious   Affect:  anxious, , increased in intensity and mood-incongruent   Memory recent  impaired   Memory remote:  impaired   Concentration/Attention:  distractable   Fund of Knowledge average   Insight:  poor   Reliability poor   Judgment:  poor          VITALS:     Patient Vitals for the past 24 hrs:   Temp Pulse Resp BP SpO2   01/30/18 1920 98.3 °F (36.8 °C) 86 16 131/77 98 %   01/30/18 1140 97.3 °F (36.3 °C) 95 12 122/68 94 %     Wt Readings from Last 3 Encounters:   01/18/18 69.4 kg (153 lb)     Temp Readings from Last 3 Encounters:   01/30/18 98.3 °F (36.8 °C)     BP Readings from Last 3 Encounters:   01/30/18 131/77     Pulse Readings from Last 3 Encounters:   01/30/18 86             DATA     LABORATORY DATA:(reviewed/updated 1/31/2018)  Recent Results (from the past 24 hour(s))   GLUCOSE, POC    Collection Time: 01/30/18 11:24 AM   Result Value Ref Range    Glucose (POC) 131 (H) 65 - 100 mg/dL    Performed by Pardeep Ada      No results found for: VALF2, VALAC, VALP, VALPR, DS6, CRBAM, CRBAMP, CARB2, XCRBAM  No results found for: LITHM   RADIOLOGY REPORTS:(reviewed/updated 1/31/2018)  No results found.        MEDICATIONS     ALL MEDICATIONS:   Current Facility-Administered Medications   Medication Dose Route Frequency    ondansetron (ZOFRAN ODT) tablet 4 mg  4 mg Oral Q8H PRN    memantine (NAMENDA) tablet 10 mg  10 mg Oral BID    risperiDONE (RisperDAL) 1 mg/mL oral solution soln 1 mg  1 mg Oral DAILY    risperiDONE (RisperDAL) 1 mg/mL oral solution soln 3 mg  3 mg Oral QHS    mirtazapine (REMERON) tablet 15 mg  15 mg Oral QHS    aspirin chewable tablet 81 mg  81 mg Oral DAILY    bisacodyl (DULCOLAX) suppository 10 mg  10 mg Rectal DAILY PRN    finasteride (PROSCAR) tablet 5 mg  5 mg Oral DAILY    ketoconazole (NIZORAL) 2 % shampoo   Topical DAILY PRN    polyethylene glycol (MIRALAX) packet 17 g  17 g Oral DAILY    senna (SENOKOT) tablet 17.2 mg  2 Tab Oral QHS    tamsulosin (FLOMAX) capsule 0.4 mg  0.4 mg Oral DAILY    triamcinolone acetonide (KENALOG) 0.1 % cream   Topical BID PRN    OLANZapine (ZyPREXA) tablet 2.5 mg  2.5 mg Oral Q6H PRN    ziprasidone (GEODON) 10 mg in sterile water (preservative free) 0.5 mL injection  10 mg IntraMUSCular BID PRN    benztropine (COGENTIN) tablet 1 mg  1 mg Oral BID PRN    benztropine (COGENTIN) injection 1 mg  1 mg IntraMUSCular BID PRN    zolpidem (AMBIEN) tablet 5 mg  5 mg Oral QHS PRN    acetaminophen (TYLENOL) tablet 650 mg  650 mg Oral Q4H PRN    ibuprofen (MOTRIN) tablet 400 mg  400 mg Oral Q8H PRN    magnesium hydroxide (MILK OF MAGNESIA) 400 mg/5 mL oral suspension 30 mL  30 mL Oral DAILY PRN    nicotine (NICODERM CQ) 21 mg/24 hr patch 1 Patch  1 Patch TransDERmal DAILY PRN      SCHEDULED MEDICATIONS:   Current Facility-Administered Medications   Medication Dose Route Frequency    memantine (NAMENDA) tablet 10 mg  10 mg Oral BID    risperiDONE (RisperDAL) 1 mg/mL oral solution soln 1 mg  1 mg Oral DAILY    risperiDONE (RisperDAL) 1 mg/mL oral solution soln 3 mg  3 mg Oral QHS    mirtazapine (REMERON) tablet 15 mg  15 mg Oral QHS    aspirin chewable tablet 81 mg  81 mg Oral DAILY    finasteride (PROSCAR) tablet 5 mg  5 mg Oral DAILY    polyethylene glycol (MIRALAX) packet 17 g  17 g Oral DAILY    senna (SENOKOT) tablet 17.2 mg  2 Tab Oral QHS    tamsulosin (FLOMAX) capsule 0.4 mg  0.4 mg Oral DAILY          ASSESSMENT & PLAN     DIAGNOSES REQUIRING ACTIVE TREATMENT AND MONITORING: (reviewed/updated 1/31/2018)  Patient Active Hospital Problem List:   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  No agitation and accepting med    Plan: will ct with current med- Namenda, liq Risperdal, Remeron. May be at his baseline  IM to follow for weakness/?AMS     Agitation (1/19/2018)    Assessment: severe, no aggression but tendency to get loud, scream and curse staff, need several re direction    Plan: prn med        I will continue to monitor blood levels (Depakote---a drug with a narrow therapeutic index= NTI) and associated labs for drug therapy implemented that require intense monitoring for toxicity as deemed appropriate based on current medication side effects and pharmacodynamically determined drug 1/2 lives. In summary, Yoselin Ennis, is a 76 y.o.  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 Patient requires continued inpatient hospitalization for further stabilization, safety monitoring and medication management. I will continue to coordinate the provision of individual, milieu, occupational, group, and substance abuse therapies to address target symptoms/diagnoses as deemed appropriate for the individual patient. A coordinated, multidisplinary treatment team round was conducted with the patient (this team consists of the nurse, psychiatric unit pharmcist,  and writer). Complete current electronic health record for patient has been reviewed today including consultant notes, ancillary staff notes, nurses and psychiatric tech notes. Suicide risk assessment completed and patient deemed to be of low risk for suicide at this time. The following regarding medications was addressed during rounds with patient:   the risks and benefits of the proposed medication. The patient was given the opportunity to ask questions. Informed consent given to the use of the above medications.  Will continue to adjust psychiatric and non-psychiatric medications (see above \"medication\" section and orders section for details) as deemed appropriate & based upon diagnoses and response to treatment. I will continue to order blood tests/labs and diagnostic tests as deemed appropriate and review results as they become available (see orders for details and above listed lab/test results). I will order psychiatric records from previous [facility] hospitals to further elucidate the nature of patient's psychopathology and review once available. I will gather additional collateral information from friends, family and o/p treatment team to further elucidate the nature of patient's psychopathology and baselline level of psychiatric functioning. I certify that this patient's inpatient psychiatric hospital services furnished since the previous certification were, and continue to be, required for treatment that could reasonably be expected to improve the patient's condition, or for diagnostic study, and that the patient continues to need, on a daily basis, active treatment furnished directly by or requiring the supervision of inpatient psychiatric facility personnel. In addition, the hospital records show that services furnished were intensive treatment services, admission or related services, or equivalent services.     EXPECTED DISCHARGE DATE/DAY: TBD     DISPOSITION: Home       Signed By:   Jermaine Norman MD  1/31/2018

## 2018-01-31 NOTE — BH NOTES
Pt has been isolated to his  room awake and quiet in bed most of the time during the shift. CT of head result normal Pt was food and med compliant, Pt was appropriate with staff without aggression. Pt denied any S/I and H/I at this time. Pt was encouraged to attend all group activities and to discuss any issues or concerns with staff. Staff will also continue to monitor pt with Q -15 checks for safety and needs.

## 2018-01-31 NOTE — PROGRESS NOTES
General Daily Progress Note    Admit Date: 1/18/2018    Subjective:     Asked to see patient for tachycardia. Pt is noted to have poor PO intake including fluids. EKG shows sinus tachycardia.       Current Facility-Administered Medications   Medication Dose Route Frequency    ondansetron (ZOFRAN ODT) tablet 4 mg  4 mg Oral Q8H PRN    memantine (NAMENDA) tablet 10 mg  10 mg Oral BID    risperiDONE (RisperDAL) 1 mg/mL oral solution soln 1 mg  1 mg Oral DAILY    risperiDONE (RisperDAL) 1 mg/mL oral solution soln 3 mg  3 mg Oral QHS    mirtazapine (REMERON) tablet 15 mg  15 mg Oral QHS    aspirin chewable tablet 81 mg  81 mg Oral DAILY    bisacodyl (DULCOLAX) suppository 10 mg  10 mg Rectal DAILY PRN    finasteride (PROSCAR) tablet 5 mg  5 mg Oral DAILY    ketoconazole (NIZORAL) 2 % shampoo   Topical DAILY PRN    polyethylene glycol (MIRALAX) packet 17 g  17 g Oral DAILY    senna (SENOKOT) tablet 17.2 mg  2 Tab Oral QHS    tamsulosin (FLOMAX) capsule 0.4 mg  0.4 mg Oral DAILY    triamcinolone acetonide (KENALOG) 0.1 % cream   Topical BID PRN    OLANZapine (ZyPREXA) tablet 2.5 mg  2.5 mg Oral Q6H PRN    ziprasidone (GEODON) 10 mg in sterile water (preservative free) 0.5 mL injection  10 mg IntraMUSCular BID PRN    benztropine (COGENTIN) tablet 1 mg  1 mg Oral BID PRN    benztropine (COGENTIN) injection 1 mg  1 mg IntraMUSCular BID PRN    zolpidem (AMBIEN) tablet 5 mg  5 mg Oral QHS PRN    acetaminophen (TYLENOL) tablet 650 mg  650 mg Oral Q4H PRN    ibuprofen (MOTRIN) tablet 400 mg  400 mg Oral Q8H PRN    magnesium hydroxide (MILK OF MAGNESIA) 400 mg/5 mL oral suspension 30 mL  30 mL Oral DAILY PRN    nicotine (NICODERM CQ) 21 mg/24 hr patch 1 Patch  1 Patch TransDERmal DAILY PRN            Objective:     Patient Vitals for the past 8 hrs:   BP Temp Pulse Resp   01/31/18 1804 135/88 97.2 °F (36.2 °C) (!) 133 18             Physical Exam:   Visit Vitals    /88    Pulse (!) 133    Temp 97.2 °F (36.2 °C)    Resp 18    Ht 5' 10\" (1.778 m)    Wt 69.4 kg (153 lb)    SpO2 98%    BMI 21.95 kg/m2     General:  Alert, cooperative, no distress, appears stated age. Head:  Normocephalic, without obvious abnormality, atraumatic. Eyes:  Conjunctivae/corneas clear. PERRL, EOMs intact. Fundi benign           Lungs:   Clear to auscultation bilaterally. Chest wall:  No tenderness or deformity. Heart:  Regular rate and rhythm, S1, S2 normal, no murmur, click, rub or gallop. Abdomen:   Soft, non-tender. Bowel sounds normal. No masses,  No organomegaly. Extremities: Extremities normal, atraumatic, no cyanosis or edema. Pulses: 2+ and symmetric all extremities. Skin: Skin color, texture, turgor normal. No rashes or lesions             No results found for this or any previous visit (from the past 24 hour(s)). Assessment:     Principal Problem:    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 (1/19/2018)    Active Problems:    Agitation (1/19/2018)    Sinus tachycardia likely related to volume depletion    Plan:     Push PO fluids. Checks labwork for electrolytes.

## 2018-01-31 NOTE — BH NOTES
Pt slept 2 hours. At one point he was on toilet for about 1 hour and was told to get up and began yelling. He received MoM for difficulty having bowel movement. Pt had no complaints or signs of distress throughout night. Respirations were unlabored. Continuing to monitor with q15 min rounds for safety.

## 2018-02-01 LAB
ALBUMIN SERPL-MCNC: 3.2 G/DL (ref 3.5–5)
ALBUMIN/GLOB SERPL: 0.8 {RATIO} (ref 1.1–2.2)
ALP SERPL-CCNC: 88 U/L (ref 45–117)
ALT SERPL-CCNC: 67 U/L (ref 12–78)
ANION GAP SERPL CALC-SCNC: 13 MMOL/L (ref 5–15)
AST SERPL-CCNC: 118 U/L (ref 15–37)
BASOPHILS # BLD: 0 K/UL (ref 0–0.1)
BASOPHILS NFR BLD: 0 % (ref 0–1)
BILIRUB SERPL-MCNC: 0.8 MG/DL (ref 0.2–1)
BUN SERPL-MCNC: 26 MG/DL (ref 6–20)
BUN/CREAT SERPL: 26 (ref 12–20)
CALCIUM SERPL-MCNC: 8.6 MG/DL (ref 8.5–10.1)
CHLORIDE SERPL-SCNC: 107 MMOL/L (ref 97–108)
CO2 SERPL-SCNC: 23 MMOL/L (ref 21–32)
CREAT SERPL-MCNC: 1.01 MG/DL (ref 0.7–1.3)
DIFFERENTIAL METHOD BLD: ABNORMAL
EOSINOPHIL # BLD: 0 K/UL (ref 0–0.4)
EOSINOPHIL NFR BLD: 0 % (ref 0–7)
ERYTHROCYTE [DISTWIDTH] IN BLOOD BY AUTOMATED COUNT: 12.4 % (ref 11.5–14.5)
GLOBULIN SER CALC-MCNC: 3.8 G/DL (ref 2–4)
GLUCOSE SERPL-MCNC: 113 MG/DL (ref 65–100)
HCT VFR BLD AUTO: 46.9 % (ref 36.6–50.3)
HGB BLD-MCNC: 15.9 G/DL (ref 12.1–17)
IMM GRANULOCYTES # BLD: 0 K/UL (ref 0–0.04)
IMM GRANULOCYTES NFR BLD AUTO: 0 % (ref 0–0.5)
LYMPHOCYTES # BLD: 1.3 K/UL (ref 0.8–3.5)
LYMPHOCYTES NFR BLD: 13 % (ref 12–49)
MCH RBC QN AUTO: 31.9 PG (ref 26–34)
MCHC RBC AUTO-ENTMCNC: 33.9 G/DL (ref 30–36.5)
MCV RBC AUTO: 94.2 FL (ref 80–99)
MONOCYTES # BLD: 1.1 K/UL (ref 0–1)
MONOCYTES NFR BLD: 10 % (ref 5–13)
NEUTS SEG # BLD: 8.1 K/UL (ref 1.8–8)
NEUTS SEG NFR BLD: 76 % (ref 32–75)
NRBC # BLD: 0 K/UL (ref 0–0.01)
NRBC BLD-RTO: 0 PER 100 WBC
PLATELET # BLD AUTO: 211 K/UL (ref 150–400)
PMV BLD AUTO: 10.2 FL (ref 8.9–12.9)
POTASSIUM SERPL-SCNC: 3.6 MMOL/L (ref 3.5–5.1)
PROT SERPL-MCNC: 7 G/DL (ref 6.4–8.2)
RBC # BLD AUTO: 4.98 M/UL (ref 4.1–5.7)
SODIUM SERPL-SCNC: 143 MMOL/L (ref 136–145)
WBC # BLD AUTO: 10.6 K/UL (ref 4.1–11.1)

## 2018-02-01 PROCEDURE — 65220000003 HC RM SEMIPRIVATE PSYCH

## 2018-02-01 PROCEDURE — 74011250637 HC RX REV CODE- 250/637: Performed by: PSYCHIATRY & NEUROLOGY

## 2018-02-01 PROCEDURE — 36415 COLL VENOUS BLD VENIPUNCTURE: CPT | Performed by: INTERNAL MEDICINE

## 2018-02-01 PROCEDURE — 80053 COMPREHEN METABOLIC PANEL: CPT | Performed by: INTERNAL MEDICINE

## 2018-02-01 PROCEDURE — 85025 COMPLETE CBC W/AUTO DIFF WBC: CPT | Performed by: INTERNAL MEDICINE

## 2018-02-01 PROCEDURE — 74011000250 HC RX REV CODE- 250: Performed by: PSYCHIATRY & NEUROLOGY

## 2018-02-01 PROCEDURE — 93005 ELECTROCARDIOGRAM TRACING: CPT

## 2018-02-01 PROCEDURE — 74011250636 HC RX REV CODE- 250/636: Performed by: PSYCHIATRY & NEUROLOGY

## 2018-02-01 RX ADMIN — WATER 10 MG: 1 INJECTION INTRAMUSCULAR; INTRAVENOUS; SUBCUTANEOUS at 22:53

## 2018-02-01 RX ADMIN — Medication 1 MG: at 10:31

## 2018-02-01 RX ADMIN — POLYETHYLENE GLYCOL 3350 17 G: 17 POWDER, FOR SOLUTION ORAL at 10:31

## 2018-02-01 RX ADMIN — FINASTERIDE 5 MG: 5 TABLET, FILM COATED ORAL at 10:31

## 2018-02-01 RX ADMIN — MEMANTINE 10 MG: 10 TABLET ORAL at 17:31

## 2018-02-01 RX ADMIN — MEMANTINE 10 MG: 10 TABLET ORAL at 10:31

## 2018-02-01 RX ADMIN — MAGNESIUM HYDROXIDE 30 ML: 400 SUSPENSION ORAL at 15:12

## 2018-02-01 RX ADMIN — ASPIRIN 81 MG: 81 TABLET, CHEWABLE ORAL at 10:31

## 2018-02-01 RX ADMIN — TAMSULOSIN HYDROCHLORIDE 0.4 MG: 0.4 CAPSULE ORAL at 10:30

## 2018-02-01 NOTE — BH NOTES
Pt slept 3 hours. He was calm and cooperative with lab draw this morning. Pt had uneventful night and required no PRN's. Pt had no complaints or signs of distress throughout night. Respirations were unlabored. Continuing to monitor with q15 min rounds for safety.

## 2018-02-01 NOTE — BH NOTES
PSYCHIATRIC PROGRESS NOTE         Patient Name  Cristo Hoffman   Date of Birth 1940   [facility] [id number]   Medical Record Number  [id number]      Age  76 y.o. PCP None   Admit date:  1/18/2018    Room Number  321/01  @ [facility]   Date of Service  2/1/2018           E & M PROGRESS NOTE:         HISTORY       CC:  \" confused , disoriented\"  HISTORY OF PRESENT ILLNESS/INTERVAL HISTORY:  (reviewed/updated 2/1/2018). per initial evaluation: The patient, Cristo Hoffman, is a 76 y.o. WHITE [place]  male with a past psychiatric history significant for dementia, who presents at this time with complaints of (and/or evidence of) the following emotional symptoms: agitation and memory impairment. Additional symptomatology include poor attention, confused, disoriented to time and place. He is a poor historian, distractible, loud and wandering behavior. The above symptoms have been present since last october. These symptoms are of severe severity. These symptoms are constant  in nature. The patient's condition has been precipitated by and psychosocial stressors ( ). Patient's condition made worse by treatment noncompliance. UDS: +benzo; BAL=0. Cristo Hoffman presents/reports/evidences the following emotional symptoms today, 2/1/2018:agitation and paranoid behavior. The above symptoms have been present for few months. These symptoms are of severe severity. The symptoms are constant in nature. Additional symptomatology and features include memory impairment, episodic agitation and poor sleep. Pt has been posturing and threatening staff and received prn med. Wandering behavior but redirectable. 1/21- remains confused , disoriented, disrobing,episodic yelling and screaming, disheveled. Refusing po med  1/22- minimal change in presentation, no aggressive episode. Need prompting and redirection. Wandering, confused  1/23- episodic of screaming, yelling outburst. Pt is redirectable .  Confused and disoriented. Sleep remains poor  1/24- irritable, episodic anger outburst but redirectable. Slept 3 hrs but tend to nap during day time. Reported to be posturing at staff but no physical aggression. Appear to do well in the morning  1/25-affect is sl better. Need redirection but does well. No physical aggression. Sleep remains erratic. Poor STM, need help with care but seek assistance. 1/26- pleasant this am, sl better, no aggression and sleep is erratic. No sig behavior problem and accepting med  1/27-Multiple episodes with loud yelling and screaming, impulsive and difficult to redirect. 1/28- Remains behavioral management challenge due to aggressiveness and dementia. Higher sedation will lead to falls propensity. Staff is doing excellent job in persistently redirecting him. 1/29- has episode of screaming and yelling and need redirection from staff. Behavior problem +. Sleep remains erratic. Memory impairment. 1/30- slept 4 hrs, likes to take a nap during morning. No agitation. Affect is sl better. Episodic screaming and yelling but redirectable. 1/31- feeling lethargic, ?weakness and unsteady gait, CT Head done to r/o AMS- no acute changes. Remains confused  with poor STM. No agitation and accepting med. Need redirection  2/1- slept 3 hrs, has poor food and fluid intake, evaluated by IM for lethargy- labs and EKG done. Alert, disoriented with poor memory. No aggresison      SIDE EFFECTS: (reviewed/updated 2/1/2018)  None reported or admitted to. No noted toxicity with use of current med   ALLERGIES:(reviewed/updated 2/1/2018)  Allergies   Allergen Reactions    Erythromycin Hives    Isopropyl Alcohol Hives    Neosporin [Neomycin-Bacitracnzn-Polymyxnb] Rash    Pcn [Penicillins] Rash    Sulfa (Sulfonamide Antibiotics) Rash      MEDICATIONS PRIOR TO ADMISSION:(reviewed/updated 2/1/2018)  Prescriptions Prior to Admission   Medication Sig    risperiDONE (RISPERDAL) 1 mg tablet Take 3 mg by mouth nightly. Indications: Behavioral disturbances associated with dementia    risperiDONE (RISPERDAL) 0.5 mg tablet Take 0.5 mg by mouth two (2) times a day. At 6 AM and 2 PM   Indications: Behavioral disturbances associated with dementia    senna (SENNA) 8.6 mg tablet Take 2 Tabs by mouth nightly. Indications: constipation    triamcinolone acetonide (KENALOG) 0.1 % topical cream Apply  to affected area two (2) times daily as needed for Skin Irritation. use thin layer    aspirin 81 mg chewable tablet Take 81 mg by mouth daily. Indications: myocardial infarction prevention, prevention of cerebrovascular accident    bisacodyl (DULCOLAX, BISACODYL,) 10 mg suppository Insert 10 mg into rectum daily as needed (Constipation).  finasteride (PROSCAR) 5 mg tablet Take 5 mg by mouth daily. Indications: benign prostatic hyperplasia with lower urinary tract sx    ketoconazole (NIZORAL) 2 % shampoo Apply  to affected area daily as needed for Itching. Patient typically uses 2-3 times/week    ondansetron hcl (ZOFRAN, AS HYDROCHLORIDE,) 4 mg tablet Take 4 mg by mouth every eight (8) hours as needed for Nausea.  polyethylene glycol (MIRALAX) 17 gram packet Take 17 g by mouth daily. Indications: constipation    tamsulosin (FLOMAX) 0.4 mg capsule Take 0.4 mg by mouth daily. Indications: benign prostatic hyperplasia with lower urinary tract sx      PAST MEDICAL HISTORY: Past medical history from the initial psychiatric evaluation has been reviewed (reviewed/updated 2/1/2018) with no additional updates (I asked patient and no additional past medical history provided). Past Medical History:   Diagnosis Date    BPH (benign prostatic hyperplasia)    No past surgical history on file. SOCIAL HISTORY: Social history from the initial psychiatric evaluation has been reviewed (reviewed/updated 2/1/2018) with no additional updates (I asked patient and no additional social history provided).    Social History     Social History    Marital status: SINGLE     Spouse name: N/A    Number of children: N/A    Years of education: N/A     Occupational History    Not on file. 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 No family history on file. REVIEW OF SYSTEMS: (reviewed/updated 2/1/2018)  Appetite:no change from normal   Sleep: does not feel rested and poor with DIMS (difficulty initiating & maintaining sleep)   All other Review of Systems: Psychological ROS: positive for - behavioral disorder, concentration difficulties, disorientation, hostility, memory difficulties and sleep disturbances  Respiratory ROS: no cough, shortness of breath, or wheezing  Cardiovascular ROS: no chest pain or dyspnea on exertion         [address] (MSE):    MSE FINDINGS ARE WITHIN NORMAL LIMITS (WNL) UNLESS OTHERWISE STATED BELOW. ( ALL OF THE BELOW CATEGORIES OF THE MSE HAVE BEEN REVIEWED (reviewed 2/1/2018) AND UPDATED AS DEEMED APPROPRIATE )  General Presentation age appropriate and disheveled, uncooperative and unreliable   Orientation confused, Alert and Oriented x 1   Vital Signs  See below (reviewed 2/1/2018); Vital Signs (BP, Pulse, & Temp) are within normal limits if not listed below.    Gait and Station Stable/steady, no ataxia   Musculoskeletal System No extrapyramidal symptoms (EPS); no abnormal muscular movements or Tardive Dyskinesia (TD); muscle strength and tone are within normal limits   Language No aphasia or dysarthria   Speech:  monotone and pressured   Thought Processes illogical; slow rate of thoughts; poor abstract reasoning/computation   Thought Associations tangential   Thought Content Free of delusion, free of hallucination   Suicidal Ideations none   Homicidal Ideations none   Mood:  anxious   Affect:  anxious, , increased in intensity and mood-incongruent   Memory recent  impaired   Memory remote:  impaired   Concentration/Attention:  distractable   Fund of Knowledge average   Insight:  poor   Reliability poor   Judgment:  poor          VITALS:     Patient Vitals for the past 24 hrs:   Temp Pulse Resp BP   02/01/18 0545 97.2 °F (36.2 °C) (!) 101 18 139/70   01/31/18 1804 97.2 °F (36.2 °C) (!) 133 18 135/88     Wt Readings from Last 3 Encounters:   01/18/18 69.4 kg (153 lb)     Temp Readings from Last 3 Encounters:   02/01/18 97.2 °F (36.2 °C)     BP Readings from Last 3 Encounters:   02/01/18 139/70     Pulse Readings from Last 3 Encounters:   02/01/18 (!) 101             DATA     LABORATORY DATA:(reviewed/updated 2/1/2018)  Recent Results (from the past 24 hour(s))   CBC WITH AUTOMATED DIFF    Collection Time: 02/01/18  4:42 AM   Result Value Ref Range    WBC 10.6 4.1 - 11.1 K/uL    RBC 4.98 4.10 - 5.70 M/uL    HGB 15.9 12.1 - 17.0 g/dL    HCT 46.9 36.6 - 50.3 %    MCV 94.2 80.0 - 99.0 FL    MCH 31.9 26.0 - 34.0 PG    MCHC 33.9 30.0 - 36.5 g/dL    RDW 12.4 11.5 - 14.5 %    PLATELET 414 862 - 932 K/uL    MPV 10.2 8.9 - 12.9 FL    NRBC 0.0 0  WBC    ABSOLUTE NRBC 0.00 0.00 - 0.01 K/uL    NEUTROPHILS 76 (H) 32 - 75 %    LYMPHOCYTES 13 12 - 49 %    MONOCYTES 10 5 - 13 %    EOSINOPHILS 0 0 - 7 %    BASOPHILS 0 0 - 1 %    IMMATURE GRANULOCYTES 0 0.0 - 0.5 %    ABS. NEUTROPHILS 8.1 (H) 1.8 - 8.0 K/UL    ABS. LYMPHOCYTES 1.3 0.8 - 3.5 K/UL    ABS. MONOCYTES 1.1 (H) 0.0 - 1.0 K/UL    ABS. EOSINOPHILS 0.0 0.0 - 0.4 K/UL    ABS.  BASOPHILS 0.0 0.0 - 0.1 K/UL    ABS. IMM. GRANS. 0.0 0.00 - 0.04 K/UL    DF AUTOMATED     METABOLIC PANEL, COMPREHENSIVE    Collection Time: 02/01/18  4:42 AM   Result Value Ref Range    Sodium 143 136 - 145 mmol/L    Potassium 3.6 3.5 - 5.1 mmol/L    Chloride 107 97 - 108 mmol/L    CO2 23 21 - 32 mmol/L    Anion gap 13 5 - 15 mmol/L    Glucose 113 (H) 65 - 100 mg/dL    BUN 26 (H) 6 - 20 MG/DL    Creatinine 1.01 0.70 - 1.30 MG/DL    BUN/Creatinine ratio 26 (H) 12 - 20      GFR est AA >60 >60 ml/min/1.73m2    GFR est non-AA >60 >60 ml/min/1.73m2    Calcium 8.6 8.5 - 10.1 MG/DL    Bilirubin, total 0.8 0.2 - 1.0 MG/DL    ALT (SGPT) 67 12 - 78 U/L    AST (SGOT) 118 (H) 15 - 37 U/L    Alk. phosphatase 88 45 - 117 U/L    Protein, total 7.0 6.4 - 8.2 g/dL    Albumin 3.2 (L) 3.5 - 5.0 g/dL    Globulin 3.8 2.0 - 4.0 g/dL    A-G Ratio 0.8 (L) 1.1 - 2.2     EKG, 12 LEAD, INITIAL    Collection Time: 02/01/18  7:48 AM   Result Value Ref Range    Ventricular Rate 87 BPM    Atrial Rate 87 BPM    P-R Interval 160 ms    QRS Duration 140 ms    Q-T Interval 416 ms    QTC Calculation (Bezet) 500 ms    Calculated P Axis 58 degrees    Calculated R Axis -9 degrees    Calculated T Axis -3 degrees    Diagnosis       Normal sinus rhythm  Right bundle branch block  Abnormal ECG  No previous ECGs available       No results found for: VALF2, VALAC, VALP, VALPR, DS6, CRBAM, CRBAMP, CARB2, XCRBAM  No results found for: LITHM   RADIOLOGY REPORTS:(reviewed/updated 2/1/2018)  No results found.        MEDICATIONS     ALL MEDICATIONS:   Current Facility-Administered Medications   Medication Dose Route Frequency    ondansetron (ZOFRAN ODT) tablet 4 mg  4 mg Oral Q8H PRN    memantine (NAMENDA) tablet 10 mg  10 mg Oral BID    risperiDONE (RisperDAL) 1 mg/mL oral solution soln 1 mg  1 mg Oral DAILY    risperiDONE (RisperDAL) 1 mg/mL oral solution soln 3 mg  3 mg Oral QHS    mirtazapine (REMERON) tablet 15 mg  15 mg Oral QHS    aspirin chewable tablet 81 mg  81 mg Oral DAILY    bisacodyl (DULCOLAX) suppository 10 mg  10 mg Rectal DAILY PRN    finasteride (PROSCAR) tablet 5 mg  5 mg Oral DAILY    ketoconazole (NIZORAL) 2 % shampoo   Topical DAILY PRN    polyethylene glycol (MIRALAX) packet 17 g  17 g Oral DAILY    senna (SENOKOT) tablet 17.2 mg  2 Tab Oral QHS    tamsulosin (FLOMAX) capsule 0.4 mg  0.4 mg Oral DAILY    triamcinolone acetonide (KENALOG) 0.1 % cream   Topical BID PRN    OLANZapine (ZyPREXA) tablet 2.5 mg  2.5 mg Oral Q6H PRN    ziprasidone (GEODON) 10 mg in sterile water (preservative free) 0.5 mL injection  10 mg IntraMUSCular BID PRN    benztropine (COGENTIN) tablet 1 mg  1 mg Oral BID PRN    benztropine (COGENTIN) injection 1 mg  1 mg IntraMUSCular BID PRN    zolpidem (AMBIEN) tablet 5 mg  5 mg Oral QHS PRN    acetaminophen (TYLENOL) tablet 650 mg  650 mg Oral Q4H PRN    ibuprofen (MOTRIN) tablet 400 mg  400 mg Oral Q8H PRN    magnesium hydroxide (MILK OF MAGNESIA) 400 mg/5 mL oral suspension 30 mL  30 mL Oral DAILY PRN    nicotine (NICODERM CQ) 21 mg/24 hr patch 1 Patch  1 Patch TransDERmal DAILY PRN      SCHEDULED MEDICATIONS:   Current Facility-Administered Medications   Medication Dose Route Frequency    memantine (NAMENDA) tablet 10 mg  10 mg Oral BID    risperiDONE (RisperDAL) 1 mg/mL oral solution soln 1 mg  1 mg Oral DAILY    risperiDONE (RisperDAL) 1 mg/mL oral solution soln 3 mg  3 mg Oral QHS    mirtazapine (REMERON) tablet 15 mg  15 mg Oral QHS    aspirin chewable tablet 81 mg  81 mg Oral DAILY    finasteride (PROSCAR) tablet 5 mg  5 mg Oral DAILY    polyethylene glycol (MIRALAX) packet 17 g  17 g Oral DAILY    senna (SENOKOT) tablet 17.2 mg  2 Tab Oral QHS    tamsulosin (FLOMAX) capsule 0.4 mg  0.4 mg Oral DAILY          ASSESSMENT & PLAN     DIAGNOSES REQUIRING ACTIVE TREATMENT AND MONITORING: (reviewed/updated 2/1/2018)  Patient Active Hospital Problem List:   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 No agitation and accepting med    Plan: will ct with current med- Namenda, liq Risperdal, Remeron. May be at his baseline  IM to follow for weakness/?AMS     Agitation (1/19/2018)    Assessment: severe, no aggression but tendency to get loud, scream and curse staff, need several re direction    Plan: prn med    Evaluated by IM for poor food/fluid intake- labs/EKG- sinus tachy, QTc 500- will repeat once medically stable      In summary, Abdifatah Jenkins, is a 76 y.o.  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 Patient requires continued inpatient hospitalization for further stabilization, safety monitoring and medication management. I will continue to coordinate the provision of individual, milieu, occupational, group, and substance abuse therapies to address target symptoms/diagnoses as deemed appropriate for the individual patient. A coordinated, multidisplinary treatment team round was conducted with the patient (this team consists of the nurse, psychiatric unit pharmcist,  and writer). Complete current electronic health record for patient has been reviewed today including consultant notes, ancillary staff notes, nurses and psychiatric tech notes. Suicide risk assessment completed and patient deemed to be of low risk for suicide at this time. The following regarding medications was addressed during rounds with patient:   the risks and benefits of the proposed medication. The patient was given the opportunity to ask questions. Informed consent given to the use of the above medications.  Will continue to adjust psychiatric and non-psychiatric medications (see above \"medication\" section and orders section for details) as deemed appropriate & based upon diagnoses and response to treatment. I will continue to order blood tests/labs and diagnostic tests as deemed appropriate and review results as they become available (see orders for details and above listed lab/test results). I will order psychiatric records from previous [facility] to further elucidate the nature of patient's psychopathology and review once available. I will gather additional collateral information from friends, family and o/p treatment team to further elucidate the nature of patient's psychopathology and baselline level of psychiatric functioning. I certify that this patient's inpatient psychiatric hospital services furnished since the previous certification were, and continue to be, required for treatment that could reasonably be expected to improve the patient's condition, or for diagnostic study, and that the patient continues to need, on a daily basis, active treatment furnished directly by or requiring the supervision of inpatient psychiatric facility personnel. In addition, the hospital records show that services furnished were intensive treatment services, admission or related services, or equivalent services.     EXPECTED DISCHARGE DATE/DAY: TBD     DISPOSITION: Home       Signed By:   Alfonzo Mcdowell MD  2/1/2018

## 2018-02-01 NOTE — PROGRESS NOTES
GROUP THERAPY PROGRESS NOTE      Ramiro Gautam was not present for medication group.       Bibi Pond, PHARMD, BCPS

## 2018-02-01 NOTE — BH NOTES
Pt has been isolated to his room resting quietly most of the time during the shift. Pt's appetite has decreased, pt offered food and fluid. Pt did not eat, but drank Ginger-valerio. Pt's pulse was 139 at the beginning of the shift. Dr. Celine Fields consulted, EKG done, EKG was sinus tachy with 101 beats. Dr Celine Fields recommends pushing of fluids and labs tomorrow morning. Pt denied any S/I and H/I at this time, and has not shown any aggression on this shift. Pt had episode of bowel and bladder incontinence, pt assisted to the bathroom, cleaned and linen changed. Pt encouraged to attend all group activities and to discuss any issues or concerns with staff. Staff will also continue to monitor pt with Q -15 checks for safety.

## 2018-02-01 NOTE — BH NOTES
REFLECTIONS GROUP THERAPY PROGRESS NOTE    The patient Tatyana Grover is participating in Reflections Group Therapy. Group time: 30 minutes    Personal goal for participation: Share with group about feelings and concerns throughout the course of the day. Goal orientation: personal    Group therapy participation: active    Therapeutic interventions reviewed and discussed: Yes    Impression of participation:   Positive input.     Dolly Uribe  1/31/2018

## 2018-02-01 NOTE — BH NOTES
Pt observed inn his room this shift, breakfast and lunch eaten in his room and fed by staff. Pt gait unsteady and slow moving, pt also stated he couldn't get up, required help from male and female staff to get him into the bath room. Pt remain frustrated and irritated when approach, pt offered no self disclosures, remain on Q 15 min checks for safety, will monitor and offer support when needed.

## 2018-02-02 LAB
ATRIAL RATE: 101 BPM
ATRIAL RATE: 87 BPM
CALCULATED P AXIS, ECG09: 45 DEGREES
CALCULATED P AXIS, ECG09: 58 DEGREES
CALCULATED R AXIS, ECG10: -24 DEGREES
CALCULATED R AXIS, ECG10: -9 DEGREES
CALCULATED T AXIS, ECG11: -3 DEGREES
CALCULATED T AXIS, ECG11: 5 DEGREES
DIAGNOSIS, 93000: NORMAL
DIAGNOSIS, 93000: NORMAL
P-R INTERVAL, ECG05: 160 MS
P-R INTERVAL, ECG05: 172 MS
Q-T INTERVAL, ECG07: 376 MS
Q-T INTERVAL, ECG07: 416 MS
QRS DURATION, ECG06: 140 MS
QRS DURATION, ECG06: 146 MS
QTC CALCULATION (BEZET), ECG08: 487 MS
QTC CALCULATION (BEZET), ECG08: 500 MS
VENTRICULAR RATE, ECG03: 101 BPM
VENTRICULAR RATE, ECG03: 87 BPM

## 2018-02-02 PROCEDURE — 65220000003 HC RM SEMIPRIVATE PSYCH

## 2018-02-02 PROCEDURE — 74011250637 HC RX REV CODE- 250/637: Performed by: PSYCHIATRY & NEUROLOGY

## 2018-02-02 RX ADMIN — Medication 1 MG: at 08:24

## 2018-02-02 RX ADMIN — TAMSULOSIN HYDROCHLORIDE 0.4 MG: 0.4 CAPSULE ORAL at 08:25

## 2018-02-02 RX ADMIN — SENNOSIDES 17.2 MG: 8.6 TABLET, FILM COATED ORAL at 21:21

## 2018-02-02 RX ADMIN — FINASTERIDE 5 MG: 5 TABLET, FILM COATED ORAL at 08:25

## 2018-02-02 RX ADMIN — POLYETHYLENE GLYCOL 3350 17 G: 17 POWDER, FOR SOLUTION ORAL at 08:24

## 2018-02-02 RX ADMIN — MEMANTINE 10 MG: 10 TABLET ORAL at 16:38

## 2018-02-02 RX ADMIN — Medication 3 MG: at 21:21

## 2018-02-02 RX ADMIN — MIRTAZAPINE 15 MG: 15 TABLET, FILM COATED ORAL at 21:20

## 2018-02-02 RX ADMIN — ASPIRIN 81 MG: 81 TABLET, CHEWABLE ORAL at 08:25

## 2018-02-02 RX ADMIN — MEMANTINE 10 MG: 10 TABLET ORAL at 08:25

## 2018-02-02 NOTE — BH NOTES
Social Work     Clinician talked with the pt's caregiver Deonte Jeronimo and gave him the update on the pt's condition. Since the pt's fall at the beginning of the week the pt has continued to be lethargic. The MD ordered a UA to rule out a UTI, as pt's CT scan was normal. Mr. Hamida Marie will continue to visit with the pt until he is discharged.

## 2018-02-02 NOTE — BH NOTES
PSYCHIATRIC PROGRESS NOTE         Patient Name  Anuj Galvan   Date of Birth 1940   [facility] [id number]   Medical Record Number  [id number]      Age  76 y.o. PCP None   Admit date:  1/18/2018    Room Number  321/01  @ [facility]   Date of Service  2/2/2018           E & M PROGRESS NOTE:         HISTORY       CC:  \" confused , disoriented\"  HISTORY OF PRESENT ILLNESS/INTERVAL HISTORY:  (reviewed/updated 2/2/2018). per initial evaluation: The patient, Anuj Galvan, is a 76 y.o. WHITE [place]  male with a past psychiatric history significant for dementia, who presents at this time with complaints of (and/or evidence of) the following emotional symptoms: agitation and memory impairment. Additional symptomatology include poor attention, confused, disoriented to time and place. He is a poor historian, distractible, loud and wandering behavior. The above symptoms have been present since last october. These symptoms are of severe severity. These symptoms are constant  in nature. The patient's condition has been precipitated by and psychosocial stressors ( ). Patient's condition made worse by treatment noncompliance. UDS: +benzo; BAL=0. Anuj Galvan presents/reports/evidences the following emotional symptoms today, 2/2/2018:agitation and paranoid behavior. The above symptoms have been present for few months. These symptoms are of severe severity. The symptoms are constant in nature. Additional symptomatology and features include memory impairment, episodic agitation and poor sleep. Pt has been posturing and threatening staff and received prn med. Wandering behavior but redirectable. 1/21- remains confused , disoriented, disrobing,episodic yelling and screaming, disheveled. Refusing po med  1/22- minimal change in presentation, no aggressive episode. Need prompting and redirection. Wandering, confused  1/23- episodic of screaming, yelling outburst. Pt is redirectable .  Confused and disoriented. Sleep remains poor  1/24- irritable, episodic anger outburst but redirectable. Slept 3 hrs but tend to nap during day time. Reported to be posturing at staff but no physical aggression. Appear to do well in the morning  1/25-affect is sl better. Need redirection but does well. No physical aggression. Sleep remains erratic. Poor STM, need help with care but seek assistance. 1/26- pleasant this am, sl better, no aggression and sleep is erratic. No sig behavior problem and accepting med  1/27-Multiple episodes with loud yelling and screaming, impulsive and difficult to redirect. 1/28- Remains behavioral management challenge due to aggressiveness and dementia. Higher sedation will lead to falls propensity. Staff is doing excellent job in persistently redirecting him. 1/29- has episode of screaming and yelling and need redirection from staff. Behavior problem +. Sleep remains erratic. Memory impairment. 1/30- slept 4 hrs, likes to take a nap during morning. No agitation. Affect is sl better. Episodic screaming and yelling but redirectable. 1/31- feeling lethargic, ?weakness and unsteady gait, CT Head done to r/o AMS- no acute changes. Remains confused  with poor STM. No agitation and accepting med. Need redirection  2/1- slept 3 hrs, has poor food and fluid intake, evaluated by IM for lethargy- labs and EKG done. Alert, disoriented with poor memory. No aggression  2/2- received prn for restlessness, no physical aggression. Slept 4 hrs. Accepting med. pleasantly confused. Need help with ADLs      SIDE EFFECTS: (reviewed/updated 2/2/2018)  None reported or admitted to.   No noted toxicity with use of current med   ALLERGIES:(reviewed/updated 2/2/2018)  Allergies   Allergen Reactions    Erythromycin Hives    Isopropyl Alcohol Hives    Neosporin [Neomycin-Bacitracnzn-Polymyxnb] Rash    Pcn [Penicillins] Rash    Sulfa (Sulfonamide Antibiotics) Rash      MEDICATIONS PRIOR TO ADMISSION:(reviewed/updated 2/2/2018)  Prescriptions Prior to Admission   Medication Sig    risperiDONE (RISPERDAL) 1 mg tablet Take 3 mg by mouth nightly. Indications: Behavioral disturbances associated with dementia    risperiDONE (RISPERDAL) 0.5 mg tablet Take 0.5 mg by mouth two (2) times a day. At 6 AM and 2 PM   Indications: Behavioral disturbances associated with dementia    senna (SENNA) 8.6 mg tablet Take 2 Tabs by mouth nightly. Indications: constipation    triamcinolone acetonide (KENALOG) 0.1 % topical cream Apply  to affected area two (2) times daily as needed for Skin Irritation. use thin layer    aspirin 81 mg chewable tablet Take 81 mg by mouth daily. Indications: myocardial infarction prevention, prevention of cerebrovascular accident    bisacodyl (DULCOLAX, BISACODYL,) 10 mg suppository Insert 10 mg into rectum daily as needed (Constipation).  finasteride (PROSCAR) 5 mg tablet Take 5 mg by mouth daily. Indications: benign prostatic hyperplasia with lower urinary tract sx    ketoconazole (NIZORAL) 2 % shampoo Apply  to affected area daily as needed for Itching. Patient typically uses 2-3 times/week    ondansetron hcl (ZOFRAN, AS HYDROCHLORIDE,) 4 mg tablet Take 4 mg by mouth every eight (8) hours as needed for Nausea.  polyethylene glycol (MIRALAX) 17 gram packet Take 17 g by mouth daily. Indications: constipation    tamsulosin (FLOMAX) 0.4 mg capsule Take 0.4 mg by mouth daily. Indications: benign prostatic hyperplasia with lower urinary tract sx      PAST MEDICAL HISTORY: Past medical history from the initial psychiatric evaluation has been reviewed (reviewed/updated 2/2/2018) with no additional updates (I asked patient and no additional past medical history provided). Past Medical History:   Diagnosis Date    BPH (benign prostatic hyperplasia)    No past surgical history on file.    SOCIAL HISTORY: Social history from the initial psychiatric evaluation has been reviewed (reviewed/updated 2/2/2018) with no additional updates (I asked patient and no additional social history provided). Social History     Social History    Marital status: SINGLE     Spouse name: N/A    Number of children: N/A    Years of education: N/A     Occupational History    Not on file. 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 No family history on file. REVIEW OF SYSTEMS: (reviewed/updated 2/2/2018)  Appetite:no change from normal   Sleep: does not feel rested and poor with DIMS (difficulty initiating & maintaining sleep)   All other Review of Systems: Psychological ROS: positive for - behavioral disorder, concentration difficulties, disorientation, hostility, memory difficulties and sleep disturbances  Respiratory ROS: no cough, shortness of breath, or wheezing  Cardiovascular ROS: no chest pain or dyspnea on exertion         [facility] (MSE):    MSE FINDINGS ARE WITHIN NORMAL LIMITS (WNL) UNLESS OTHERWISE STATED BELOW. ( ALL OF THE BELOW CATEGORIES OF THE MSE HAVE BEEN REVIEWED (reviewed 2/2/2018) AND UPDATED AS DEEMED APPROPRIATE )  General Presentation age appropriate and disheveled, uncooperative and unreliable   Orientation confused, Alert and Oriented x 1   Vital Signs  See below (reviewed 2/2/2018); Vital Signs (BP, Pulse, & Temp) are within normal limits if not listed below.    Gait and Station Stable/steady, no ataxia Musculoskeletal System No extrapyramidal symptoms (EPS); no abnormal muscular movements or Tardive Dyskinesia (TD); muscle strength and tone are within normal limits   Language No aphasia or dysarthria   Speech:  monotone and pressured   Thought Processes illogical; slow rate of thoughts; poor abstract reasoning/computation   Thought Associations tangential   Thought Content Free of delusion, free of hallucination   Suicidal Ideations none   Homicidal Ideations none   Mood:  anxious   Affect:  anxious, , increased in intensity and mood-incongruent   Memory recent  impaired   Memory remote:  impaired   Concentration/Attention:  distractable   Fund of Knowledge average   Insight:  poor   Reliability poor   Judgment:  poor          VITALS:     Patient Vitals for the past 24 hrs:   Temp Pulse Resp BP   02/02/18 0631 98 °F (36.7 °C) 82 18 142/72   02/01/18 1716 97.8 °F (36.6 °C) 93 16 130/76     Wt Readings from Last 3 Encounters:   01/18/18 69.4 kg (153 lb)     Temp Readings from Last 3 Encounters:   02/02/18 98 °F (36.7 °C)     BP Readings from Last 3 Encounters:   02/02/18 142/72     Pulse Readings from Last 3 Encounters:   02/02/18 82             DATA     LABORATORY DATA:(reviewed/updated 2/2/2018)  No results found for this or any previous visit (from the past 24 hour(s)). No results found for: VALF2, VALAC, VALP, VALPR, DS6, CRBAM, CRBAMP, CARB2, XCRBAM  No results found for: LITHM   RADIOLOGY REPORTS:(reviewed/updated 2/2/2018)  No results found.        MEDICATIONS     ALL MEDICATIONS:   Current Facility-Administered Medications   Medication Dose Route Frequency    ondansetron (ZOFRAN ODT) tablet 4 mg  4 mg Oral Q8H PRN    memantine (NAMENDA) tablet 10 mg  10 mg Oral BID    risperiDONE (RisperDAL) 1 mg/mL oral solution soln 1 mg  1 mg Oral DAILY    risperiDONE (RisperDAL) 1 mg/mL oral solution soln 3 mg  3 mg Oral QHS    mirtazapine (REMERON) tablet 15 mg  15 mg Oral QHS    aspirin chewable tablet 81 mg 81 mg Oral DAILY    bisacodyl (DULCOLAX) suppository 10 mg  10 mg Rectal DAILY PRN    finasteride (PROSCAR) tablet 5 mg  5 mg Oral DAILY    ketoconazole (NIZORAL) 2 % shampoo   Topical DAILY PRN    polyethylene glycol (MIRALAX) packet 17 g  17 g Oral DAILY    senna (SENOKOT) tablet 17.2 mg  2 Tab Oral QHS    tamsulosin (FLOMAX) capsule 0.4 mg  0.4 mg Oral DAILY    triamcinolone acetonide (KENALOG) 0.1 % cream   Topical BID PRN    OLANZapine (ZyPREXA) tablet 2.5 mg  2.5 mg Oral Q6H PRN    benztropine (COGENTIN) tablet 1 mg  1 mg Oral BID PRN    benztropine (COGENTIN) injection 1 mg  1 mg IntraMUSCular BID PRN    zolpidem (AMBIEN) tablet 5 mg  5 mg Oral QHS PRN    acetaminophen (TYLENOL) tablet 650 mg  650 mg Oral Q4H PRN    ibuprofen (MOTRIN) tablet 400 mg  400 mg Oral Q8H PRN    magnesium hydroxide (MILK OF MAGNESIA) 400 mg/5 mL oral suspension 30 mL  30 mL Oral DAILY PRN    nicotine (NICODERM CQ) 21 mg/24 hr patch 1 Patch  1 Patch TransDERmal DAILY PRN      SCHEDULED MEDICATIONS:   Current Facility-Administered Medications   Medication Dose Route Frequency    memantine (NAMENDA) tablet 10 mg  10 mg Oral BID    risperiDONE (RisperDAL) 1 mg/mL oral solution soln 1 mg  1 mg Oral DAILY    risperiDONE (RisperDAL) 1 mg/mL oral solution soln 3 mg  3 mg Oral QHS    mirtazapine (REMERON) tablet 15 mg  15 mg Oral QHS    aspirin chewable tablet 81 mg  81 mg Oral DAILY    finasteride (PROSCAR) tablet 5 mg  5 mg Oral DAILY    polyethylene glycol (MIRALAX) packet 17 g  17 g Oral DAILY    senna (SENOKOT) tablet 17.2 mg  2 Tab Oral QHS    tamsulosin (FLOMAX) capsule 0.4 mg  0.4 mg Oral DAILY          ASSESSMENT & PLAN     DIAGNOSES REQUIRING ACTIVE TREATMENT AND MONITORING: (reviewed/updated 2/2/2018)  Patient Active Hospital Problem List:   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  No agitation and accepting med    Plan: will ct with current med- Namenda, liq Risperdal, Remeron. May be at his baseline- will discuss with his Psychiatrist  IM to follow for weakness/?AMS    DC Geodon prn due to QTC prolongation rsik     Agitation (1/19/2018)    Assessment: severe, no aggression but tendency to get loud, scream and curse staff, need several re direction    Plan: prn med    Evaluated by IM for poor food/fluid intake- labs/EKG- sinus tachy, QTc 500- will repeat once medically stable      In summary, Jamaica Howell, is a 76 y.o.  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 Patient requires continued inpatient hospitalization for further stabilization, safety monitoring and medication management. I will continue to coordinate the provision of individual, milieu, occupational, group, and substance abuse therapies to address target symptoms/diagnoses as deemed appropriate for the individual patient. A coordinated, multidisplinary treatment team round was conducted with the patient (this team consists of the nurse, psychiatric unit pharmcist,  and writer). Complete current electronic health record for patient has been reviewed today including consultant notes, ancillary staff notes, nurses and psychiatric tech notes. Suicide risk assessment completed and patient deemed to be of low risk for suicide at this time. The following regarding medications was addressed during rounds with patient:   the risks and benefits of the proposed medication. The patient was given the opportunity to ask questions. Informed consent given to the use of the above medications. Will continue to adjust psychiatric and non-psychiatric medications (see above \"medication\" section and orders section for details) as deemed appropriate & based upon diagnoses and response to treatment.      I will continue to order blood tests/labs and diagnostic tests as deemed appropriate and review results as they become available (see orders for details and above listed lab/test results). I will order psychiatric records from previous [facility] hospitals to further elucidate the nature of patient's psychopathology and review once available. I will gather additional collateral information from friends, family and o/p treatment team to further elucidate the nature of patient's psychopathology and baselline level of psychiatric functioning. I certify that this patient's inpatient psychiatric hospital services furnished since the previous certification were, and continue to be, required for treatment that could reasonably be expected to improve the patient's condition, or for diagnostic study, and that the patient continues to need, on a daily basis, active treatment furnished directly by or requiring the supervision of inpatient psychiatric facility personnel. In addition, the hospital records show that services furnished were intensive treatment services, admission or related services, or equivalent services.     EXPECTED DISCHARGE DATE/DAY: TBD     DISPOSITION: Home       Signed By:   Narinder Gaspar MD  2/2/2018

## 2018-02-02 NOTE — BH NOTES
Pt stays in his room sitting quietly most of the time on this shift with dull affect and depressed mood. Pt ate about 40% of his dinner and hydrated well. Pt has refused night medication after several prompts. At about 2300, pt continues to be restless and agitated, Geodon 10mg IM administered. Pt will continue to be monitored for safety and needs according to the hospital protocol.

## 2018-02-03 PROCEDURE — 74011250637 HC RX REV CODE- 250/637: Performed by: PSYCHIATRY & NEUROLOGY

## 2018-02-03 PROCEDURE — 65220000003 HC RM SEMIPRIVATE PSYCH

## 2018-02-03 RX ADMIN — TAMSULOSIN HYDROCHLORIDE 0.4 MG: 0.4 CAPSULE ORAL at 08:54

## 2018-02-03 RX ADMIN — Medication 3 MG: at 21:29

## 2018-02-03 RX ADMIN — MIRTAZAPINE 15 MG: 15 TABLET, FILM COATED ORAL at 21:29

## 2018-02-03 RX ADMIN — Medication 1 MG: at 08:55

## 2018-02-03 RX ADMIN — POLYETHYLENE GLYCOL 3350 17 G: 17 POWDER, FOR SOLUTION ORAL at 08:56

## 2018-02-03 RX ADMIN — FINASTERIDE 5 MG: 5 TABLET, FILM COATED ORAL at 08:54

## 2018-02-03 RX ADMIN — SENNOSIDES 17.2 MG: 8.6 TABLET, FILM COATED ORAL at 21:29

## 2018-02-03 RX ADMIN — MEMANTINE 10 MG: 10 TABLET ORAL at 16:44

## 2018-02-03 RX ADMIN — MEMANTINE 10 MG: 10 TABLET ORAL at 08:55

## 2018-02-03 RX ADMIN — ASPIRIN 81 MG: 81 TABLET, CHEWABLE ORAL at 08:55

## 2018-02-03 NOTE — PROGRESS NOTES
Pt rested quietly in bed with eyes closed. No signs/symptoms of distress. Will monitor for changes. Q 15 minute checks continue.   . Total hours slept-5.5

## 2018-02-03 NOTE — BH NOTES
COMMUNITY GROUP THERAPY PROGRESS NOTE    The patient Umberto Chappell is participating in the Community Therapy Group. Group time: 30 minutes    Personal goal for participation: To review unit guidelines and treatment plan    Goal orientation: personal    Group therapy participation: active    Therapeutic interventions reviewed and discussed: Yes    Impression of participation: Positive input.     Zohra Robles  2/3/2018

## 2018-02-03 NOTE — PROGRESS NOTES
Pt intermittently confused . Denies SI/HI and contracts for safety. Pt denies psychotic symptoms. Pt is able to reality test. Pt is compliant with medication and meals. Pt attends groups and activities. Pt requires assistance with  ADLS. Continue to assess mood an behavior. Assist to reality test. Monitor on Q 15 checks.

## 2018-02-03 NOTE — BH NOTES
Problem: Dementia-BEHAVIORAL HEALTH (Adult/Pediatric)  Goal: *STG: Remains safe in hospital  Outcome: Progressing Towards Goal  Pt remains confused due to dementia. Pt is hard to direct, not listening to staff well. Will continue to monitor q 15 for safety, mood and behavior changes.

## 2018-02-03 NOTE — BH NOTES
PSYCHIATRIC PROGRESS NOTE         Patient Name  Anjelica Mayorga   Date of Birth 1940   [facility] [id number]   Medical Record Number  [id number]      Age  76 y.o. PCP None   Admit date:  1/18/2018    Room Number  321/01  @ [facility]   Date of Service  2/3/2018           E & M PROGRESS NOTE:         HISTORY       CC:  \" confused , disoriented\"  HISTORY OF PRESENT ILLNESS/INTERVAL HISTORY:  (reviewed/updated 2/3/2018). per initial evaluation: The patient, Anjelica Mayorga, is a 76 y.o. WHITE [place]  male with a past psychiatric history significant for dementia, who presents at this time with complaints of (and/or evidence of) the following emotional symptoms: agitation and memory impairment. Additional symptomatology include poor attention, confused, disoriented to time and place. He is a poor historian, distractible, loud and wandering behavior. The above symptoms have been present since last october. These symptoms are of severe severity. These symptoms are constant  in nature. The patient's condition has been precipitated by and psychosocial stressors ( ). Patient's condition made worse by treatment noncompliance. UDS: +benzo; BAL=0. Anjelica Mayorga presents/reports/evidences the following emotional symptoms today, 2/3/2018:agitation and paranoid behavior. The above symptoms have been present for few months. These symptoms are of severe severity. The symptoms are constant in nature. Additional symptomatology and features include memory impairment, episodic agitation and poor sleep. Pt has been posturing and threatening staff and received prn med. Wandering behavior but redirectable. 1/21- remains confused , disoriented, disrobing,episodic yelling and screaming, disheveled. Refusing po med  1/22- minimal change in presentation, no aggressive episode. Need prompting and redirection. Wandering, confused  1/23- episodic of screaming, yelling outburst. Pt is redirectable .  Confused and disoriented. Sleep remains poor  1/24- irritable, episodic anger outburst but redirectable. Slept 3 hrs but tend to nap during day time. Reported to be posturing at staff but no physical aggression. Appear to do well in the morning  1/25-affect is sl better. Need redirection but does well. No physical aggression. Sleep remains erratic. Poor STM, need help with care but seek assistance. 1/26- pleasant this am, sl better, no aggression and sleep is erratic. No sig behavior problem and accepting med  1/27-Multiple episodes with loud yelling and screaming, impulsive and difficult to redirect. 1/28- Remains behavioral management challenge due to aggressiveness and dementia. Higher sedation will lead to falls propensity. Staff is doing excellent job in persistently redirecting him. 1/29- has episode of screaming and yelling and need redirection from staff. Behavior problem +. Sleep remains erratic. Memory impairment. 1/30- slept 4 hrs, likes to take a nap during morning. No agitation. Affect is sl better. Episodic screaming and yelling but redirectable. 1/31- feeling lethargic, ?weakness and unsteady gait, CT Head done to r/o AMS- no acute changes. Remains confused  with poor STM. No agitation and accepting med. Need redirection  2/1- slept 3 hrs, has poor food and fluid intake, evaluated by IM for lethargy- labs and EKG done. Alert, disoriented with poor memory. No aggression  2/2- received prn for restlessness, no physical aggression. Slept 4 hrs. Accepting med. pleasantly confused. Need help with ADLs  2/3- Has periods with increased agitation and yelling outs. Requires frequent re-directions. No prn's used. SIDE EFFECTS: (reviewed/updated 2/3/2018)  None reported or admitted to.   No noted toxicity with use of current med   ALLERGIES:(reviewed/updated 2/3/2018)  Allergies   Allergen Reactions    Erythromycin Hives    Isopropyl Alcohol Hives    Neosporin [Neomycin-Bacitracnzn-Polymyxnb] Rash    Pcn [Penicillins] Rash    Sulfa (Sulfonamide Antibiotics) Rash      MEDICATIONS PRIOR TO ADMISSION:(reviewed/updated 2/3/2018)  Prescriptions Prior to Admission   Medication Sig    risperiDONE (RISPERDAL) 1 mg tablet Take 3 mg by mouth nightly. Indications: Behavioral disturbances associated with dementia    risperiDONE (RISPERDAL) 0.5 mg tablet Take 0.5 mg by mouth two (2) times a day. At 6 AM and 2 PM   Indications: Behavioral disturbances associated with dementia    senna (SENNA) 8.6 mg tablet Take 2 Tabs by mouth nightly. Indications: constipation    triamcinolone acetonide (KENALOG) 0.1 % topical cream Apply  to affected area two (2) times daily as needed for Skin Irritation. use thin layer    aspirin 81 mg chewable tablet Take 81 mg by mouth daily. Indications: myocardial infarction prevention, prevention of cerebrovascular accident    bisacodyl (DULCOLAX, BISACODYL,) 10 mg suppository Insert 10 mg into rectum daily as needed (Constipation).  finasteride (PROSCAR) 5 mg tablet Take 5 mg by mouth daily. Indications: benign prostatic hyperplasia with lower urinary tract sx    ketoconazole (NIZORAL) 2 % shampoo Apply  to affected area daily as needed for Itching. Patient typically uses 2-3 times/week    ondansetron hcl (ZOFRAN, AS HYDROCHLORIDE,) 4 mg tablet Take 4 mg by mouth every eight (8) hours as needed for Nausea.  polyethylene glycol (MIRALAX) 17 gram packet Take 17 g by mouth daily. Indications: constipation    tamsulosin (FLOMAX) 0.4 mg capsule Take 0.4 mg by mouth daily. Indications: benign prostatic hyperplasia with lower urinary tract sx      PAST MEDICAL HISTORY: Past medical history from the initial psychiatric evaluation has been reviewed (reviewed/updated 2/3/2018) with no additional updates (I asked patient and no additional past medical history provided). Past Medical History:   Diagnosis Date    BPH (benign prostatic hyperplasia)    No past surgical history on file.    SOCIAL HISTORY: Social history from the initial psychiatric evaluation has been reviewed (reviewed/updated 2/3/2018) with no additional updates (I asked patient and no additional social history provided). Social History     Social History    Marital status: SINGLE     Spouse name: N/A    Number of children: N/A    Years of education: N/A     Occupational History    Not on file. 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      FAMILY HISTORY: Family history from the initial psychiatric evaluation has been reviewed (reviewed/updated 2/3/2018) with no additional updates (I asked patient and no additional family history provided). No family history on file. REVIEW OF SYSTEMS: (reviewed/updated 2/3/2018)  Appetite:no change from normal   Sleep: does not feel rested and poor with DIMS (difficulty initiating & maintaining sleep)   All other Review of Systems: Psychological ROS: positive for - behavioral disorder, concentration difficulties, disorientation, hostility, memory difficulties and sleep disturbances  Respiratory ROS: no cough, shortness of breath, or wheezing  Cardiovascular ROS: no chest pain or dyspnea on exertion         [facility] (MSE):    MSE FINDINGS ARE WITHIN NORMAL LIMITS (WNL) UNLESS OTHERWISE STATED BELOW. ( ALL OF THE BELOW CATEGORIES OF THE MSE HAVE BEEN REVIEWED (reviewed 2/3/2018) AND UPDATED AS DEEMED APPROPRIATE )  General Presentation age appropriate and disheveled, uncooperative and unreliable   Orientation confused, Alert and Oriented x 1   Vital Signs  See below (reviewed 2/3/2018);  Vital Signs (BP, Pulse, & Temp) are within normal limits if not listed below. Gait and Station Stable/steady, no ataxia   Musculoskeletal System No extrapyramidal symptoms (EPS); no abnormal muscular movements or Tardive Dyskinesia (TD); muscle strength and tone are within normal limits   Language No aphasia or dysarthria   Speech:  monotone and pressured   Thought Processes illogical; slow rate of thoughts; poor abstract reasoning/computation   Thought Associations tangential   Thought Content Free of delusion, free of hallucination   Suicidal Ideations none   Homicidal Ideations none   Mood:  anxious   Affect:  anxious, , increased in intensity and mood-incongruent   Memory recent  impaired   Memory remote:  impaired   Concentration/Attention:  distractable   Fund of Knowledge average   Insight:  poor   Reliability poor   Judgment:  poor          VITALS:     Patient Vitals for the past 24 hrs:   Temp Pulse Resp BP   02/02/18 2116 97.6 °F (36.4 °C) (!) 111 18 117/81     Wt Readings from Last 3 Encounters:   01/18/18 69.4 kg (153 lb)     Temp Readings from Last 3 Encounters:   02/02/18 97.6 °F (36.4 °C)     BP Readings from Last 3 Encounters:   02/02/18 117/81     Pulse Readings from Last 3 Encounters:   02/02/18 (!) 111             DATA     LABORATORY DATA:(reviewed/updated 2/3/2018)  No results found for this or any previous visit (from the past 24 hour(s)). No results found for: VALF2, VALAC, VALP, VALPR, DS6, CRBAM, CRBAMP, CARB2, XCRBAM  No results found for: LITHM   RADIOLOGY REPORTS:(reviewed/updated 2/3/2018)  No results found.        MEDICATIONS     ALL MEDICATIONS:   Current Facility-Administered Medications   Medication Dose Route Frequency    ondansetron (ZOFRAN ODT) tablet 4 mg  4 mg Oral Q8H PRN    memantine (NAMENDA) tablet 10 mg  10 mg Oral BID    risperiDONE (RisperDAL) 1 mg/mL oral solution soln 1 mg  1 mg Oral DAILY    risperiDONE (RisperDAL) 1 mg/mL oral solution soln 3 mg  3 mg Oral QHS    mirtazapine (REMERON) tablet 15 mg  15 mg Oral QHS    aspirin chewable tablet 81 mg  81 mg Oral DAILY    bisacodyl (DULCOLAX) suppository 10 mg  10 mg Rectal DAILY PRN    finasteride (PROSCAR) tablet 5 mg  5 mg Oral DAILY    ketoconazole (NIZORAL) 2 % shampoo   Topical DAILY PRN    polyethylene glycol (MIRALAX) packet 17 g  17 g Oral DAILY    senna (SENOKOT) tablet 17.2 mg  2 Tab Oral QHS    tamsulosin (FLOMAX) capsule 0.4 mg  0.4 mg Oral DAILY    triamcinolone acetonide (KENALOG) 0.1 % cream   Topical BID PRN    OLANZapine (ZyPREXA) tablet 2.5 mg  2.5 mg Oral Q6H PRN    benztropine (COGENTIN) tablet 1 mg  1 mg Oral BID PRN    benztropine (COGENTIN) injection 1 mg  1 mg IntraMUSCular BID PRN    zolpidem (AMBIEN) tablet 5 mg  5 mg Oral QHS PRN    acetaminophen (TYLENOL) tablet 650 mg  650 mg Oral Q4H PRN    ibuprofen (MOTRIN) tablet 400 mg  400 mg Oral Q8H PRN    magnesium hydroxide (MILK OF MAGNESIA) 400 mg/5 mL oral suspension 30 mL  30 mL Oral DAILY PRN    nicotine (NICODERM CQ) 21 mg/24 hr patch 1 Patch  1 Patch TransDERmal DAILY PRN      SCHEDULED MEDICATIONS:   Current Facility-Administered Medications   Medication Dose Route Frequency    memantine (NAMENDA) tablet 10 mg  10 mg Oral BID    risperiDONE (RisperDAL) 1 mg/mL oral solution soln 1 mg  1 mg Oral DAILY    risperiDONE (RisperDAL) 1 mg/mL oral solution soln 3 mg  3 mg Oral QHS    mirtazapine (REMERON) tablet 15 mg  15 mg Oral QHS    aspirin chewable tablet 81 mg  81 mg Oral DAILY    finasteride (PROSCAR) tablet 5 mg  5 mg Oral DAILY    polyethylene glycol (MIRALAX) packet 17 g  17 g Oral DAILY    senna (SENOKOT) tablet 17.2 mg  2 Tab Oral QHS    tamsulosin (FLOMAX) capsule 0.4 mg  0.4 mg Oral DAILY          ASSESSMENT & PLAN     DIAGNOSES REQUIRING ACTIVE TREATMENT AND MONITORING: (reviewed/updated 2/3/2018)  Patient Active Hospital Problem List:   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 No agitation and accepting med    Plan: will ct with current med- Namenda, liq Risperdal, Remeron. May be at his baseline- will discuss with his Psychiatrist  IM to follow for weakness/?AMS    DC Geodon prn due to QTC prolongation rsik     Agitation (1/19/2018)    Assessment: severe, no aggression but tendency to get loud, scream and curse staff, need several re direction    Plan: prn med    Evaluated by IM for poor food/fluid intake- labs/EKG- sinus tachy, QTc 500- will repeat once medically stable      In summary, Lizz Ellis, is a 76 y.o.  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 Patient requires continued inpatient hospitalization for further stabilization, safety monitoring and medication management. I will continue to coordinate the provision of individual, milieu, occupational, group, and substance abuse therapies to address target symptoms/diagnoses as deemed appropriate for the individual patient. A coordinated, multidisplinary treatment team round was conducted with the patient (this team consists of the nurse, psychiatric unit pharmcist,  and writer). Complete current electronic health record for patient has been reviewed today including consultant notes, ancillary staff notes, nurses and psychiatric tech notes. Suicide risk assessment completed and patient deemed to be of low risk for suicide at this time. The following regarding medications was addressed during rounds with patient:   the risks and benefits of the proposed medication. The patient was given the opportunity to ask questions. Informed consent given to the use of the above medications.  Will continue to adjust psychiatric and non-psychiatric medications (see above \"medication\" section and orders section for details) as deemed appropriate & based upon diagnoses and response to treatment. I will continue to order blood tests/labs and diagnostic tests as deemed appropriate and review results as they become available (see orders for details and above listed lab/test results). I will order psychiatric records from previous [facility] hospitals to further elucidate the nature of patient's psychopathology and review once available. I will gather additional collateral information from friends, family and o/p treatment team to further elucidate the nature of patient's psychopathology and baselline level of psychiatric functioning. I certify that this patient's inpatient psychiatric hospital services furnished since the previous certification were, and continue to be, required for treatment that could reasonably be expected to improve the patient's condition, or for diagnostic study, and that the patient continues to need, on a daily basis, active treatment furnished directly by or requiring the supervision of inpatient psychiatric facility personnel. In addition, the hospital records show that services furnished were intensive treatment services, admission or related services, or equivalent services.     EXPECTED DISCHARGE DATE/DAY: TBD     DISPOSITION: Home       Signed By:   Anthony Rodgers MD  2/3/2018

## 2018-02-04 PROCEDURE — 65220000003 HC RM SEMIPRIVATE PSYCH

## 2018-02-04 PROCEDURE — 74011250637 HC RX REV CODE- 250/637: Performed by: PSYCHIATRY & NEUROLOGY

## 2018-02-04 RX ADMIN — ASPIRIN 81 MG: 81 TABLET, CHEWABLE ORAL at 08:50

## 2018-02-04 RX ADMIN — FINASTERIDE 5 MG: 5 TABLET, FILM COATED ORAL at 08:50

## 2018-02-04 RX ADMIN — MEMANTINE 10 MG: 10 TABLET ORAL at 08:51

## 2018-02-04 RX ADMIN — MEMANTINE 10 MG: 10 TABLET ORAL at 16:29

## 2018-02-04 RX ADMIN — Medication 3 MG: at 22:10

## 2018-02-04 RX ADMIN — SENNOSIDES 17.2 MG: 8.6 TABLET, FILM COATED ORAL at 22:10

## 2018-02-04 RX ADMIN — TAMSULOSIN HYDROCHLORIDE 0.4 MG: 0.4 CAPSULE ORAL at 08:51

## 2018-02-04 RX ADMIN — Medication 1 MG: at 08:50

## 2018-02-04 RX ADMIN — POLYETHYLENE GLYCOL 3350 17 G: 17 POWDER, FOR SOLUTION ORAL at 08:50

## 2018-02-04 RX ADMIN — MIRTAZAPINE 15 MG: 15 TABLET, FILM COATED ORAL at 22:10

## 2018-02-04 NOTE — BH NOTES
Problem: Dementia-BEHAVIORAL HEALTH (Adult/Pediatric)  Goal: *STG: Remains safe in hospital  Outcome: Progressing Towards Goal  Pt is more visible in the unit today. Pt is calmer today, meds/meals compliant. Pt is following more directions today. Pt is eating better and sleeping better. No PRN medication is needed.  Will continue to monitor q 15 for safety, mood and behavior changes.

## 2018-02-04 NOTE — PROGRESS NOTES
Pt rested quietly in bed with eyes closed. Pt up once during the night incontinent of bladder. Bed linen changed. Patient cleaned. No signs/symptoms of distress. Will monitor for changes. Q 15 minute checks continue.  Total hours slept-5

## 2018-02-04 NOTE — PROGRESS NOTES
Pt is confused at times. Denies SI/HI and contracts for safety. Pt denies psychotic symptoms. Pt is compliant with medication and meals. Isolating to his room. Pt requires assistance with  ADLS. Continue to assess mood an behavior. Assist to reality test. Monitor on Q 15 checks.

## 2018-02-04 NOTE — BH NOTES
PSYCHIATRIC PROGRESS NOTE         Patient Name  Janel Uribe   Date of Birth 1940   [facility] [id number]   Medical Record Number  [id number]      Age  76 y.o. PCP None   Admit date:  1/18/2018    Room Number  321/01  @ [facility]   Date of Service  2/4/2018           E & M PROGRESS NOTE:         HISTORY       CC:  \" confused , disoriented\"  HISTORY OF PRESENT ILLNESS/INTERVAL HISTORY:  (reviewed/updated 2/4/2018). per initial evaluation: The patient, Janel Uribe, is a 76 y.o. WHITE [place]  male with a past psychiatric history significant for dementia, who presents at this time with complaints of (and/or evidence of) the following emotional symptoms: agitation and memory impairment. Additional symptomatology include poor attention, confused, disoriented to time and place. He is a poor historian, distractible, loud and wandering behavior. The above symptoms have been present since last october. These symptoms are of severe severity. These symptoms are constant  in nature. The patient's condition has been precipitated by and psychosocial stressors ( ). Patient's condition made worse by treatment noncompliance. UDS: +benzo; BAL=0. Janel Uribe presents/reports/evidences the following emotional symptoms today, 2/4/2018:agitation and paranoid behavior. The above symptoms have been present for few months. These symptoms are of severe severity. The symptoms are constant in nature. Additional symptomatology and features include memory impairment, episodic agitation and poor sleep. Pt has been posturing and threatening staff and received prn med. Wandering behavior but redirectable. 1/21- remains confused , disoriented, disrobing,episodic yelling and screaming, disheveled. Refusing po med  1/22- minimal change in presentation, no aggressive episode. Need prompting and redirection. Wandering, confused  1/23- episodic of screaming, yelling outburst. Pt is redirectable .  Confused and disoriented. Sleep remains poor  1/24- irritable, episodic anger outburst but redirectable. Slept 3 hrs but tend to nap during day time. Reported to be posturing at staff but no physical aggression. Appear to do well in the morning  1/25-affect is sl better. Need redirection but does well. No physical aggression. Sleep remains erratic. Poor STM, need help with care but seek assistance. 1/26- pleasant this am, sl better, no aggression and sleep is erratic. No sig behavior problem and accepting med  1/27-Multiple episodes with loud yelling and screaming, impulsive and difficult to redirect. 1/28- Remains behavioral management challenge due to aggressiveness and dementia. Higher sedation will lead to falls propensity. Staff is doing excellent job in persistently redirecting him. 1/29- has episode of screaming and yelling and need redirection from staff. Behavior problem +. Sleep remains erratic. Memory impairment. 1/30- slept 4 hrs, likes to take a nap during morning. No agitation. Affect is sl better. Episodic screaming and yelling but redirectable. 1/31- feeling lethargic, ?weakness and unsteady gait, CT Head done to r/o AMS- no acute changes. Remains confused  with poor STM. No agitation and accepting med. Need redirection  2/1- slept 3 hrs, has poor food and fluid intake, evaluated by IM for lethargy- labs and EKG done. Alert, disoriented with poor memory. No aggression  2/2- received prn for restlessness, no physical aggression. Slept 4 hrs. Accepting med. pleasantly confused. Need help with ADLs  2/3- Has periods with increased agitation and yelling outs. Requires frequent re-directions. No prn's used. 2/4-No prn's used. Otherwise status quo. SIDE EFFECTS: (reviewed/updated 2/4/2018)  None reported or admitted to.   No noted toxicity with use of current med   ALLERGIES:(reviewed/updated 2/4/2018)  Allergies   Allergen Reactions    Erythromycin Hives    Isopropyl Alcohol Hives    Neosporin [Neomycin-Bacitracnzn-Polymyxnb] Rash    Pcn [Penicillins] Rash    Sulfa (Sulfonamide Antibiotics) Rash      MEDICATIONS PRIOR TO ADMISSION:(reviewed/updated 2/4/2018)  Prescriptions Prior to Admission   Medication Sig    risperiDONE (RISPERDAL) 1 mg tablet Take 3 mg by mouth nightly. Indications: Behavioral disturbances associated with dementia    risperiDONE (RISPERDAL) 0.5 mg tablet Take 0.5 mg by mouth two (2) times a day. At 6 AM and 2 PM   Indications: Behavioral disturbances associated with dementia    senna (SENNA) 8.6 mg tablet Take 2 Tabs by mouth nightly. Indications: constipation    triamcinolone acetonide (KENALOG) 0.1 % topical cream Apply  to affected area two (2) times daily as needed for Skin Irritation. use thin layer    aspirin 81 mg chewable tablet Take 81 mg by mouth daily. Indications: myocardial infarction prevention, prevention of cerebrovascular accident    bisacodyl (DULCOLAX, BISACODYL,) 10 mg suppository Insert 10 mg into rectum daily as needed (Constipation).  finasteride (PROSCAR) 5 mg tablet Take 5 mg by mouth daily. Indications: benign prostatic hyperplasia with lower urinary tract sx    ketoconazole (NIZORAL) 2 % shampoo Apply  to affected area daily as needed for Itching. Patient typically uses 2-3 times/week    ondansetron hcl (ZOFRAN, AS HYDROCHLORIDE,) 4 mg tablet Take 4 mg by mouth every eight (8) hours as needed for Nausea.  polyethylene glycol (MIRALAX) 17 gram packet Take 17 g by mouth daily. Indications: constipation    tamsulosin (FLOMAX) 0.4 mg capsule Take 0.4 mg by mouth daily. Indications: benign prostatic hyperplasia with lower urinary tract sx      PAST MEDICAL HISTORY: Past medical history from the initial psychiatric evaluation has been reviewed (reviewed/updated 2/4/2018) with no additional updates (I asked patient and no additional past medical history provided).    Past Medical History:   Diagnosis Date    BPH (benign prostatic hyperplasia)    No past surgical history on file. SOCIAL HISTORY: Social history from the initial psychiatric evaluation has been reviewed (reviewed/updated 2/4/2018) with no additional updates (I asked patient and no additional social history provided). Social History     Social History    Marital status: SINGLE     Spouse name: N/A    Number of children: N/A    Years of education: N/A     Occupational History    Not on file. 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 No family history on file.     REVIEW OF SYSTEMS: (reviewed/updated 2/4/2018)  Appetite:no change from normal   Sleep: does not feel rested and poor with DIMS (difficulty initiating & maintaining sleep)   All other Review of Systems: Psychological ROS: positive for - behavioral disorder, concentration difficulties, disorientation, hostility, memory difficulties and sleep disturbances  Respiratory ROS: no cough, shortness of breath, or wheezing  Cardiovascular ROS: no chest pain or dyspnea on exertion         [facility] (MSE):    MSE FINDINGS ARE WITHIN NORMAL LIMITS (WNL) UNLESS OTHERWISE STATED BELOW. ( ALL OF THE BELOW CATEGORIES OF THE MSE HAVE BEEN REVIEWED (reviewed 2/4/2018) AND UPDATED AS DEEMED APPROPRIATE )  General Presentation age appropriate and disheveled, uncooperative and unreliable   Orientation confused, Alert and Oriented x 1   Vital Signs See below (reviewed 2/4/2018); Vital Signs (BP, Pulse, & Temp) are within normal limits if not listed below. Gait and Station Stable/steady, no ataxia   Musculoskeletal System No extrapyramidal symptoms (EPS); no abnormal muscular movements or Tardive Dyskinesia (TD); muscle strength and tone are within normal limits   Language No aphasia or dysarthria   Speech:  monotone and pressured   Thought Processes illogical; slow rate of thoughts; poor abstract reasoning/computation   Thought Associations tangential   Thought Content Free of delusion, free of hallucination   Suicidal Ideations none   Homicidal Ideations none   Mood:  anxious   Affect:  anxious, , increased in intensity and mood-incongruent   Memory recent  impaired   Memory remote:  impaired   Concentration/Attention:  distractable   Fund of Knowledge average   Insight:  poor   Reliability poor   Judgment:  poor          VITALS:     Patient Vitals for the past 24 hrs:   Temp Pulse Resp BP   02/04/18 0630 98.2 °F (36.8 °C) (!) 109 18 132/71     Wt Readings from Last 3 Encounters:   01/18/18 69.4 kg (153 lb)     Temp Readings from Last 3 Encounters:   02/04/18 98.2 °F (36.8 °C)     BP Readings from Last 3 Encounters:   02/04/18 132/71     Pulse Readings from Last 3 Encounters:   02/04/18 (!) 109             DATA     LABORATORY DATA:(reviewed/updated 2/4/2018)  No results found for this or any previous visit (from the past 24 hour(s)). No results found for: VALF2, VALAC, VALP, VALPR, DS6, CRBAM, CRBAMP, CARB2, XCRBAM  No results found for: LITHM   RADIOLOGY REPORTS:(reviewed/updated 2/4/2018)  No results found.        MEDICATIONS     ALL MEDICATIONS:   Current Facility-Administered Medications   Medication Dose Route Frequency    ondansetron (ZOFRAN ODT) tablet 4 mg  4 mg Oral Q8H PRN    memantine (NAMENDA) tablet 10 mg  10 mg Oral BID    risperiDONE (RisperDAL) 1 mg/mL oral solution soln 1 mg  1 mg Oral DAILY    risperiDONE (RisperDAL) 1 mg/mL oral solution soln 3 mg  3 mg Oral QHS    mirtazapine (REMERON) tablet 15 mg  15 mg Oral QHS    aspirin chewable tablet 81 mg  81 mg Oral DAILY    bisacodyl (DULCOLAX) suppository 10 mg  10 mg Rectal DAILY PRN    finasteride (PROSCAR) tablet 5 mg  5 mg Oral DAILY    ketoconazole (NIZORAL) 2 % shampoo   Topical DAILY PRN    polyethylene glycol (MIRALAX) packet 17 g  17 g Oral DAILY    senna (SENOKOT) tablet 17.2 mg  2 Tab Oral QHS    tamsulosin (FLOMAX) capsule 0.4 mg  0.4 mg Oral DAILY    triamcinolone acetonide (KENALOG) 0.1 % cream   Topical BID PRN    OLANZapine (ZyPREXA) tablet 2.5 mg  2.5 mg Oral Q6H PRN    benztropine (COGENTIN) tablet 1 mg  1 mg Oral BID PRN    benztropine (COGENTIN) injection 1 mg  1 mg IntraMUSCular BID PRN    zolpidem (AMBIEN) tablet 5 mg  5 mg Oral QHS PRN    acetaminophen (TYLENOL) tablet 650 mg  650 mg Oral Q4H PRN    ibuprofen (MOTRIN) tablet 400 mg  400 mg Oral Q8H PRN    magnesium hydroxide (MILK OF MAGNESIA) 400 mg/5 mL oral suspension 30 mL  30 mL Oral DAILY PRN    nicotine (NICODERM CQ) 21 mg/24 hr patch 1 Patch  1 Patch TransDERmal DAILY PRN      SCHEDULED MEDICATIONS:   Current Facility-Administered Medications   Medication Dose Route Frequency    memantine (NAMENDA) tablet 10 mg  10 mg Oral BID    risperiDONE (RisperDAL) 1 mg/mL oral solution soln 1 mg  1 mg Oral DAILY    risperiDONE (RisperDAL) 1 mg/mL oral solution soln 3 mg  3 mg Oral QHS    mirtazapine (REMERON) tablet 15 mg  15 mg Oral QHS    aspirin chewable tablet 81 mg  81 mg Oral DAILY    finasteride (PROSCAR) tablet 5 mg  5 mg Oral DAILY    polyethylene glycol (MIRALAX) packet 17 g  17 g Oral DAILY    senna (SENOKOT) tablet 17.2 mg  2 Tab Oral QHS    tamsulosin (FLOMAX) capsule 0.4 mg  0.4 mg Oral DAILY          ASSESSMENT & PLAN     DIAGNOSES REQUIRING ACTIVE TREATMENT AND MONITORING: (reviewed/updated 2/4/2018)  Patient Active Hospital Problem List:   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 No agitation and accepting med    Plan: will ct with current med- Namenda, liq Risperdal, Remeron. May be at his baseline- will discuss with his Psychiatrist  IM to follow for weakness/?AMS    DC Geodon prn due to QTC prolongation rsik     Agitation (1/19/2018)    Assessment: severe, no aggression but tendency to get loud, scream and curse staff, need several re direction    Plan: prn med    Evaluated by IM for poor food/fluid intake- labs/EKG- sinus tachy, QTc 500- will repeat once medically stable      In summary, Danielle Baron, is a 76 y.o.  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 Patient requires continued inpatient hospitalization for further stabilization, safety monitoring and medication management. I will continue to coordinate the provision of individual, milieu, occupational, group, and substance abuse therapies to address target symptoms/diagnoses as deemed appropriate for the individual patient. A coordinated, multidisplinary treatment team round was conducted with the patient (this team consists of the nurse, psychiatric unit pharmcist,  and writer). Complete current electronic health record for patient has been reviewed today including consultant notes, ancillary staff notes, nurses and psychiatric tech notes. Suicide risk assessment completed and patient deemed to be of low risk for suicide at this time. The following regarding medications was addressed during rounds with patient:   the risks and benefits of the proposed medication. The patient was given the opportunity to ask questions. Informed consent given to the use of the above medications.  Will continue to adjust psychiatric and non-psychiatric medications (see above \"medication\" section and orders section for details) as deemed appropriate & based upon diagnoses and response to treatment. I will continue to order blood tests/labs and diagnostic tests as deemed appropriate and review results as they become available (see orders for details and above listed lab/test results). I will order psychiatric records from previous [facility] hospitals to further elucidate the nature of patient's psychopathology and review once available. I will gather additional collateral information from friends, family and o/p treatment team to further elucidate the nature of patient's psychopathology and baselline level of psychiatric functioning. I certify that this patient's inpatient psychiatric hospital services furnished since the previous certification were, and continue to be, required for treatment that could reasonably be expected to improve the patient's condition, or for diagnostic study, and that the patient continues to need, on a daily basis, active treatment furnished directly by or requiring the supervision of inpatient psychiatric facility personnel. In addition, the hospital records show that services furnished were intensive treatment services, admission or related services, or equivalent services.     EXPECTED DISCHARGE DATE/DAY: TBD     DISPOSITION: Home       Signed By:   Michelle Castillo MD  2/4/2018

## 2018-02-05 PROCEDURE — 97116 GAIT TRAINING THERAPY: CPT

## 2018-02-05 PROCEDURE — 65220000003 HC RM SEMIPRIVATE PSYCH

## 2018-02-05 PROCEDURE — 74011250637 HC RX REV CODE- 250/637: Performed by: PSYCHIATRY & NEUROLOGY

## 2018-02-05 RX ADMIN — MEMANTINE 10 MG: 10 TABLET ORAL at 18:37

## 2018-02-05 RX ADMIN — MEMANTINE 10 MG: 10 TABLET ORAL at 08:51

## 2018-02-05 RX ADMIN — FINASTERIDE 5 MG: 5 TABLET, FILM COATED ORAL at 08:51

## 2018-02-05 RX ADMIN — POLYETHYLENE GLYCOL 3350 17 G: 17 POWDER, FOR SOLUTION ORAL at 08:51

## 2018-02-05 RX ADMIN — SENNOSIDES 17.2 MG: 8.6 TABLET, FILM COATED ORAL at 21:03

## 2018-02-05 RX ADMIN — MIRTAZAPINE 15 MG: 15 TABLET, FILM COATED ORAL at 21:03

## 2018-02-05 RX ADMIN — TAMSULOSIN HYDROCHLORIDE 0.4 MG: 0.4 CAPSULE ORAL at 08:51

## 2018-02-05 RX ADMIN — Medication 3 MG: at 21:03

## 2018-02-05 RX ADMIN — Medication 1 MG: at 08:51

## 2018-02-05 RX ADMIN — ASPIRIN 81 MG: 81 TABLET, CHEWABLE ORAL at 08:51

## 2018-02-05 NOTE — BH NOTES
Problem: Dementia-BEHAVIORAL HEALTH (Adult/Pediatric)  Goal: *STG: Remains safe in hospital  Outcome: Progressing Towards Goal  Pt is more cooperative in the unit this evening. Pt is meds/meals compliant. Pt is following more directions today. Pt is eating better and sleeping better. No PRN medication is needed. Will continue to monitor q 15 for safety, mood and behavior changes.

## 2018-02-05 NOTE — BH NOTES
PSYCHIATRIC PROGRESS NOTE         Patient Name  Adam Shaw   Date of Birth 1940   [facility] [id number]   Medical Record Number  [id number]      Age  76 y.o. PCP None   Admit date:  1/18/2018    Room Number  321/01  @ [facility]   Date of Service  2/5/2018           E & M PROGRESS NOTE:         HISTORY       CC:  \" confused , disoriented\"  HISTORY OF PRESENT ILLNESS/INTERVAL HISTORY:  (reviewed/updated 2/5/2018). per initial evaluation: The patient, Adam Shaw, is a 76 y.o. WHITE [place]  male with a past psychiatric history significant for dementia, who presents at this time with complaints of (and/or evidence of) the following emotional symptoms: agitation and memory impairment. Additional symptomatology include poor attention, confused, disoriented to time and place. He is a poor historian, distractible, loud and wandering behavior. The above symptoms have been present since last october. These symptoms are of severe severity. These symptoms are constant  in nature. The patient's condition has been precipitated by and psychosocial stressors ( ). Patient's condition made worse by treatment noncompliance. UDS: +benzo; BAL=0. Adam Shaw presents/reports/evidences the following emotional symptoms today, 2/5/2018:agitation and paranoid behavior. The above symptoms have been present for few months. These symptoms are of severe severity. The symptoms are constant in nature. Additional symptomatology and features include memory impairment, episodic agitation and poor sleep. Pt has been posturing and threatening staff and received prn med. Wandering behavior but redirectable. 1/21- remains confused , disoriented, disrobing,episodic yelling and screaming, disheveled. Refusing po med  1/22- minimal change in presentation, no aggressive episode. Need prompting and redirection. Wandering, confused  1/23- episodic of screaming, yelling outburst. Pt is redirectable .  Confused and disoriented. Sleep remains poor  1/24- irritable, episodic anger outburst but redirectable. Slept 3 hrs but tend to nap during day time. Reported to be posturing at staff but no physical aggression. Appear to do well in the morning  1/25-affect is sl better. Need redirection but does well. No physical aggression. Sleep remains erratic. Poor STM, need help with care but seek assistance. 1/26- pleasant this am, sl better, no aggression and sleep is erratic. No sig behavior problem and accepting med  1/27-Multiple episodes with loud yelling and screaming, impulsive and difficult to redirect. 1/28- Remains behavioral management challenge due to aggressiveness and dementia. Higher sedation will lead to falls propensity. Staff is doing excellent job in persistently redirecting him. 1/29- has episode of screaming and yelling and need redirection from staff. Behavior problem +. Sleep remains erratic. Memory impairment. 1/30- slept 4 hrs, likes to take a nap during morning. No agitation. Affect is sl better. Episodic screaming and yelling but redirectable. 1/31- feeling lethargic, ?weakness and unsteady gait, CT Head done to r/o AMS- no acute changes. Remains confused  with poor STM. No agitation and accepting med. Need redirection  2/1- slept 3 hrs, has poor food and fluid intake, evaluated by IM for lethargy- labs and EKG done. Alert, disoriented with poor memory. No aggression  2/2- received prn for restlessness, no physical aggression. Slept 4 hrs. Accepting med. pleasantly confused. Need help with ADLs  2/3- Has periods with increased agitation and yelling outs. Requires frequent re-directions. No prn's used. 2/4-No prn's used. Otherwise status quo.  2/5- no aggressive behavior. Need frequent re direction due to dementia. Has episode of yelling but tend to stop when directed. No physical aggression. accepting meal and med without problem.  Sleep is erratic, tend to nap during daytime      SIDE EFFECTS: (reviewed/updated 2/5/2018)  None reported or admitted to. No noted toxicity with use of current med   ALLERGIES:(reviewed/updated 2/5/2018)  Allergies   Allergen Reactions    Erythromycin Hives    Isopropyl Alcohol Hives    Neosporin [Neomycin-Bacitracnzn-Polymyxnb] Rash    Pcn [Penicillins] Rash    Sulfa (Sulfonamide Antibiotics) Rash      MEDICATIONS PRIOR TO ADMISSION:(reviewed/updated 2/5/2018)  Prescriptions Prior to Admission   Medication Sig    risperiDONE (RISPERDAL) 1 mg tablet Take 3 mg by mouth nightly. Indications: Behavioral disturbances associated with dementia    risperiDONE (RISPERDAL) 0.5 mg tablet Take 0.5 mg by mouth two (2) times a day. At 6 AM and 2 PM   Indications: Behavioral disturbances associated with dementia    senna (SENNA) 8.6 mg tablet Take 2 Tabs by mouth nightly. Indications: constipation    triamcinolone acetonide (KENALOG) 0.1 % topical cream Apply  to affected area two (2) times daily as needed for Skin Irritation. use thin layer    aspirin 81 mg chewable tablet Take 81 mg by mouth daily. Indications: myocardial infarction prevention, prevention of cerebrovascular accident    bisacodyl (DULCOLAX, BISACODYL,) 10 mg suppository Insert 10 mg into rectum daily as needed (Constipation).  finasteride (PROSCAR) 5 mg tablet Take 5 mg by mouth daily. Indications: benign prostatic hyperplasia with lower urinary tract sx    ketoconazole (NIZORAL) 2 % shampoo Apply  to affected area daily as needed for Itching. Patient typically uses 2-3 times/week    ondansetron hcl (ZOFRAN, AS HYDROCHLORIDE,) 4 mg tablet Take 4 mg by mouth every eight (8) hours as needed for Nausea.  polyethylene glycol (MIRALAX) 17 gram packet Take 17 g by mouth daily. Indications: constipation    tamsulosin (FLOMAX) 0.4 mg capsule Take 0.4 mg by mouth daily.  Indications: benign prostatic hyperplasia with lower urinary tract sx      PAST MEDICAL HISTORY: Past medical history from the initial psychiatric evaluation has been reviewed (reviewed/updated 2/5/2018) with no additional updates (I asked patient and no additional past medical history provided). Past Medical History:   Diagnosis Date    BPH (benign prostatic hyperplasia)    No past surgical history on file. SOCIAL HISTORY: Social history from the initial psychiatric evaluation has been reviewed (reviewed/updated 2/5/2018) with no additional updates (I asked patient and no additional social history provided). Social History     Social History    Marital status: SINGLE     Spouse name: N/A    Number of children: N/A    Years of education: N/A     Occupational History    Not on file. 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 No family history on file.     REVIEW OF SYSTEMS: (reviewed/updated 2/5/2018)  Appetite:no change from normal   Sleep: does not feel rested and poor with DIMS (difficulty initiating & maintaining sleep)   All other Review of Systems: Psychological ROS: positive for - concentration difficulties, disorientation, hostility, memory difficulties and sleep disturbances  Respiratory ROS: no cough, shortness of breath, or wheezing  Cardiovascular ROS: no chest pain or dyspnea on exertion         [address] [facility] (MSE):    MSE FINDINGS ARE WITHIN NORMAL LIMITS (WNL) UNLESS OTHERWISE STATED BELOW. ( ALL OF THE BELOW CATEGORIES OF THE MSE HAVE BEEN REVIEWED (reviewed 2/5/2018) AND UPDATED AS DEEMED APPROPRIATE )  General Presentation age appropriate and disheveled, uncooperative and unreliable   Orientation confused, Alert and Oriented x 1   Vital Signs  See below (reviewed 2/5/2018); Vital Signs (BP, Pulse, & Temp) are within normal limits if not listed below. Gait and Station Stable/steady, no ataxia   Musculoskeletal System No extrapyramidal symptoms (EPS); no abnormal muscular movements or Tardive Dyskinesia (TD); muscle strength and tone are within normal limits   Language No aphasia or dysarthria   Speech:  monotone and pressured   Thought Processes illogical; slow rate of thoughts; poor abstract reasoning/computation   Thought Associations tangential   Thought Content Free of delusion, free of hallucination   Suicidal Ideations none   Homicidal Ideations none   Mood:  anxious   Affect:  anxious, , increased in intensity and mood-incongruent   Memory recent  impaired   Memory remote:  impaired   Concentration/Attention:  distractable   Fund of Knowledge average   Insight:  poor   Reliability poor   Judgment:  poor          VITALS:     No data found. Wt Readings from Last 3 Encounters:   01/18/18 69.4 kg (153 lb)     Temp Readings from Last 3 Encounters:   02/04/18 98.2 °F (36.8 °C)     BP Readings from Last 3 Encounters:   02/04/18 132/71     Pulse Readings from Last 3 Encounters:   02/04/18 (!) 109             DATA     LABORATORY DATA:(reviewed/updated 2/5/2018)  No results found for this or any previous visit (from the past 24 hour(s)). No results found for: VALF2, VALAC, VALP, VALPR, DS6, CRBAM, CRBAMP, CARB2, XCRBAM  No results found for: LITHM   RADIOLOGY REPORTS:(reviewed/updated 2/5/2018)  No results found.        MEDICATIONS     ALL MEDICATIONS:   Current Facility-Administered Medications   Medication Dose Route Frequency    ondansetron (ZOFRAN ODT) tablet 4 mg  4 mg Oral Q8H PRN    memantine (NAMENDA) tablet 10 mg  10 mg Oral BID    risperiDONE (RisperDAL) 1 mg/mL oral solution soln 1 mg  1 mg Oral DAILY    risperiDONE (RisperDAL) 1 mg/mL oral solution soln 3 mg  3 mg Oral QHS    mirtazapine (REMERON) tablet 15 mg  15 mg Oral QHS    aspirin chewable tablet 81 mg  81 mg Oral DAILY    bisacodyl (DULCOLAX) suppository 10 mg  10 mg Rectal DAILY PRN    finasteride (PROSCAR) tablet 5 mg  5 mg Oral DAILY    ketoconazole (NIZORAL) 2 % shampoo   Topical DAILY PRN    polyethylene glycol (MIRALAX) packet 17 g  17 g Oral DAILY    senna (SENOKOT) tablet 17.2 mg  2 Tab Oral QHS    tamsulosin (FLOMAX) capsule 0.4 mg  0.4 mg Oral DAILY    triamcinolone acetonide (KENALOG) 0.1 % cream   Topical BID PRN    OLANZapine (ZyPREXA) tablet 2.5 mg  2.5 mg Oral Q6H PRN    benztropine (COGENTIN) tablet 1 mg  1 mg Oral BID PRN    benztropine (COGENTIN) injection 1 mg  1 mg IntraMUSCular BID PRN    zolpidem (AMBIEN) tablet 5 mg  5 mg Oral QHS PRN    acetaminophen (TYLENOL) tablet 650 mg  650 mg Oral Q4H PRN    ibuprofen (MOTRIN) tablet 400 mg  400 mg Oral Q8H PRN    magnesium hydroxide (MILK OF MAGNESIA) 400 mg/5 mL oral suspension 30 mL  30 mL Oral DAILY PRN    nicotine (NICODERM CQ) 21 mg/24 hr patch 1 Patch  1 Patch TransDERmal DAILY PRN      SCHEDULED MEDICATIONS:   Current Facility-Administered Medications   Medication Dose Route Frequency    memantine (NAMENDA) tablet 10 mg  10 mg Oral BID    risperiDONE (RisperDAL) 1 mg/mL oral solution soln 1 mg  1 mg Oral DAILY    risperiDONE (RisperDAL) 1 mg/mL oral solution soln 3 mg  3 mg Oral QHS    mirtazapine (REMERON) tablet 15 mg  15 mg Oral QHS    aspirin chewable tablet 81 mg  81 mg Oral DAILY    finasteride (PROSCAR) tablet 5 mg  5 mg Oral DAILY    polyethylene glycol (MIRALAX) packet 17 g  17 g Oral DAILY    senna (SENOKOT) tablet 17.2 mg  2 Tab Oral QHS    tamsulosin (FLOMAX) capsule 0.4 mg  0.4 mg Oral DAILY          ASSESSMENT & PLAN     DIAGNOSES REQUIRING ACTIVE TREATMENT AND MONITORING: (reviewed/updated 2/5/2018)  Patient Active Hospital Problem List:   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 (1/19/2018)    Assessment: progressing- no agitation or physical aggression- poor memory, no behavior problem- CT head- no acute change. Plan: will ct with current med- Namenda, liq Risperdal, Remeron. May be at his baseline-discussed with Dr Anni Ramírez (pt's Psychiatrist) and updated her with med changes/ behavior management- tend to work well with known staff/ need redirection when confused ( has episode of yelling and screaming but with redirection. He does well), tend to nap during daytime and will need activities to keep him occupied during the day time. Cautious with AP due to prolonged  QTc. DC Geodon prn due to QTC prolongation rsik     Agitation (1/19/2018)    Assessment: improved    Plan: no  prn med        In summary, Frances Elder, is a 76 y.o.  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 Patient requires continued inpatient hospitalization for further stabilization, safety monitoring and medication management. I will continue to coordinate the provision of individual, milieu, occupational, group, and substance abuse therapies to address target symptoms/diagnoses as deemed appropriate for the individual patient. A coordinated, multidisplinary treatment team round was conducted with the patient (this team consists of the nurse, psychiatric unit pharmcist,  and writer). Complete current electronic health record for patient has been reviewed today including consultant notes, ancillary staff notes, nurses and psychiatric tech notes. Suicide risk assessment completed and patient deemed to be of low risk for suicide at this time.      The following regarding medications was addressed during rounds with patient:   the risks and benefits of the proposed medication. The patient was given the opportunity to ask questions. Informed consent given to the use of the above medications. Will continue to adjust psychiatric and non-psychiatric medications (see above \"medication\" section and orders section for details) as deemed appropriate & based upon diagnoses and response to treatment. I will continue to order blood tests/labs and diagnostic tests as deemed appropriate and review results as they become available (see orders for details and above listed lab/test results). I will order psychiatric records from previous [facility] hospitals to further elucidate the nature of patient's psychopathology and review once available. I will gather additional collateral information from friends, family and o/p treatment team to further elucidate the nature of patient's psychopathology and baselline level of psychiatric functioning. I certify that this patient's inpatient psychiatric hospital services furnished since the previous certification were, and continue to be, required for treatment that could reasonably be expected to improve the patient's condition, or for diagnostic study, and that the patient continues to need, on a daily basis, active treatment furnished directly by or requiring the supervision of inpatient psychiatric facility personnel. In addition, the hospital records show that services furnished were intensive treatment services, admission or related services, or equivalent services.     EXPECTED DISCHARGE DATE/DAY: Tuesday     DISPOSITION: Home       Signed By:   Sourav Saab MD  2/5/2018

## 2018-02-05 NOTE — BH NOTES
COMMUNITY GROUP THERAPY PROGRESS NOTE    The patient Roman Farley is participating in the Community Therapy Group. Group time: 30 minutes    Personal goal for participation: To review unit guidelines and treatment plan    Goal orientation: personal    Group therapy participation: minimal    Therapeutic interventions reviewed and discussed: Yes    Impression of participation:  Needed prompting to ensure participation.     Zohra Rutherford  2/4/2018

## 2018-02-05 NOTE — PROGRESS NOTES
Problem: Mobility Impaired (Adult and Pediatric)  Goal: *Acute Goals and Plan of Care (Insert Text)  Physical Therapy Goals  Initiated 1/23/2018  1. Patient will move from supine to sit and sit to supine , scoot up and down and roll side to side in bed with independence within 7 day(s). 2.  Patient will transfer from bed to chair and chair to bed with independence using the least restrictive device within 7   day(s). 3.  Patient will perform sit to stand with independence within 7 day(s). 4.  Patient will ambulate with modified independence for 300 feet with the least restrictive device within 7 day(s). Physical Therapy Goals  Initiated 2/5/2018  1. Patient will move from supine to sit and sit to supine , scoot up and down and roll side to side in bed with modified independence within 7 day(s). 2.  Patient will transfer from bed to chair and chair to bed with supervision/set-up using the least restrictive device within 7 day(s). 3.  Patient will perform sit to stand with supervision/set-up within 7 day(s). 4.  Patient will ambulate with minimal assistance/contact guard assist for 100 feet with the least restrictive device within 7 day(s). physical Therapy REEVALUATION  Seen 1155 to 1210  Patient: Gloria Mora (63 y.o. male)  Date: 2/5/2018  Primary Diagnosis: Alzheimers Disease  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        Precautions:   Fall  Chart, physical therapy assessment, plan of care and goals were reviewed. ASSESSMENT :  Based on the objective data described below, the patient presents with limited functional mobility, forward flexed posture in sitting and standing and shuffling festinating gait pattern. Pt sitting in day room on arrival, asleep, but agreeable to work with Therapy. Pt to noted have significant forward head and flexed trunk posture, attempted to have pt lift head and hold shoulders back in sitting, limited ability this date.  While ambulating, continued with flexed posture, not lifting his head enough to look ahead. Nursing reports that his mobility has decreased significantly. Will follow for functional mobility    Patient will benefit from skilled intervention to address the above impairments. Patients rehabilitation potential is considered to be Fair  Factors which may influence rehabilitation potential include:   []           None noted  [x]           Mental ability/status  [x]           Medical condition  []           Home/family situation and support systems  [x]           Safety awareness  []           Pain tolerance/management  []           Other:      PLAN :  Recommendations and Planned Interventions:  [x]             Bed Mobility Training             []      Neuromuscular Re-Education  [x]             Transfer Training                   []      Orthotic/Prosthetic Training  [x]             Gait Training                         []      Modalities  [x]             Therapeutic Exercises           []      Edema Management/Control  []             Therapeutic Activities            [x]      Patient and Family Training/Education  []             Other (comment):  Frequency/Duration: Patient will be followed by physical therapy 2 times a week to address goals. Discharge Recommendations: [facility]  Further Equipment Recommendations for Discharge: TBD     SUBJECTIVE:   Patient stated I'll try, give me a few minutes.     OBJECTIVE DATA SUMMARY:     Past Medical History:   Diagnosis Date    BPH (benign prostatic hyperplasia)    No past surgical history on file.   Hospital course since last seen and reason for reevaluation: continued debility  Critical Behavior:  Neurologic State: Alert, Confused  Orientation Level: Disoriented to situation, Disoriented to time     Strength:    Strength: Generally decreased, functional    Range Of Motion:  AROM: Generally decreased, functional    Functional Mobility:  Bed Mobility:  N/A  Transfers:  Sit to Stand: Moderate assistance  Stand to Sit: Moderate assistance  Balance:   Sitting: Intact  Standing: Impaired  Standing - Static: Poor  Standing - Dynamic : Poor  Ambulation/Gait Training:  Distance (ft): 10 Feet (ft)  Assistive Device: Gait belt;Walker, rollator  Ambulation - Level of Assistance: Moderate assistance  Gait Abnormalities: Shuffling gait  Base of Support: Narrowed  Speed/Bety: Shuffled  Step Length: Left shortened;Right shortened        Activity Tolerance: Tolerated poorly  Please refer to the flowsheet for vital signs taken during this treatment. After treatment:   [x]  Patient left in no apparent distress sitting up in chair  []  Patient left in no apparent distress in bed  [x]  Call bell left within reach  [x]  Nursing notified  []  Caregiver present  []  Bed alarm activated    COMMUNICATION/EDUCATION:   The patients plan of care was discussed with: Registered Nurse. [x]  Fall prevention education was provided and the patient/caregiver indicated understanding. [x]  Patient/family have participated as able in goal setting and plan of care. [x]  Patient/family agree to work toward stated goals and plan of care. []  Patient understands intent and goals of therapy, but is neutral about his/her participation. []  Patient is unable to participate in goal setting and plan of care.     Thank you for this referral.  Ashleigh Moctezuma, PT   Time Calculation: 15 mins

## 2018-02-05 NOTE — PROGRESS NOTES
Pt is alert/oriented x4. Calm and Cooperative. Appropriate in the milieu. Mood and affect improving. Meds/meal compliant. No PRNs given this shift. No behavioral issues. Will continue to monitor pt with q15 min rounds for safety.

## 2018-02-05 NOTE — PROGRESS NOTES
Pt slept 3 hours. Pt had uneventful night and required no PRN's. Pt had no complaints or signs of distress throughout night. Respirations were unlabored. Continuing to monitor with q15 min rounds for safety.

## 2018-02-06 VITALS
BODY MASS INDEX: 21.9 KG/M2 | WEIGHT: 153 LBS | TEMPERATURE: 97.9 F | OXYGEN SATURATION: 98 % | DIASTOLIC BLOOD PRESSURE: 91 MMHG | HEART RATE: 156 BPM | RESPIRATION RATE: 16 BRPM | HEIGHT: 70 IN | SYSTOLIC BLOOD PRESSURE: 111 MMHG

## 2018-02-06 PROCEDURE — 74011250637 HC RX REV CODE- 250/637: Performed by: PSYCHIATRY & NEUROLOGY

## 2018-02-06 RX ORDER — MEMANTINE HYDROCHLORIDE 10 MG/1
10 TABLET ORAL 2 TIMES DAILY
Qty: 14 TAB | Refills: 0 | Status: SHIPPED | OUTPATIENT
Start: 2018-02-06 | End: 2018-02-13

## 2018-02-06 RX ORDER — MIRTAZAPINE 15 MG/1
15 TABLET, FILM COATED ORAL
Qty: 7 TAB | Refills: 0 | Status: SHIPPED | OUTPATIENT
Start: 2018-02-06 | End: 2018-02-13

## 2018-02-06 RX ORDER — RISPERIDONE 1 MG/ML
1 SOLUTION ORAL DAILY
Qty: 7 ML | Refills: 0 | Status: SHIPPED | OUTPATIENT
Start: 2018-02-07 | End: 2018-02-14

## 2018-02-06 RX ORDER — TAMSULOSIN HYDROCHLORIDE 0.4 MG/1
0.4 CAPSULE ORAL DAILY
Qty: 7 CAP | Refills: 0 | Status: SHIPPED | OUTPATIENT
Start: 2018-02-07 | End: 2018-02-14

## 2018-02-06 RX ORDER — SENNOSIDES 8.6 MG/1
2 TABLET ORAL
Qty: 14 TAB | Refills: 0 | Status: SHIPPED | OUTPATIENT
Start: 2018-02-06 | End: 2018-02-13

## 2018-02-06 RX ORDER — POLYETHYLENE GLYCOL 3350 17 G/17G
17 POWDER, FOR SOLUTION ORAL DAILY
Qty: 7 PACKET | Refills: 0 | Status: SHIPPED | OUTPATIENT
Start: 2018-02-07 | End: 2018-02-14

## 2018-02-06 RX ORDER — FINASTERIDE 5 MG/1
5 TABLET, FILM COATED ORAL DAILY
Qty: 7 TAB | Refills: 0 | Status: SHIPPED | OUTPATIENT
Start: 2018-02-07 | End: 2018-02-14

## 2018-02-06 RX ORDER — GUAIFENESIN 100 MG/5ML
81 LIQUID (ML) ORAL DAILY
Qty: 7 TAB | Refills: 0 | Status: SHIPPED | OUTPATIENT
Start: 2018-02-07 | End: 2018-02-14

## 2018-02-06 RX ORDER — RISPERIDONE 1 MG/ML
3 SOLUTION ORAL
Qty: 21 ML | Refills: 0 | Status: SHIPPED | OUTPATIENT
Start: 2018-02-06 | End: 2018-02-13

## 2018-02-06 RX ADMIN — ASPIRIN 81 MG: 81 TABLET, CHEWABLE ORAL at 09:06

## 2018-02-06 RX ADMIN — TAMSULOSIN HYDROCHLORIDE 0.4 MG: 0.4 CAPSULE ORAL at 09:06

## 2018-02-06 RX ADMIN — FINASTERIDE 5 MG: 5 TABLET, FILM COATED ORAL at 09:06

## 2018-02-06 RX ADMIN — POLYETHYLENE GLYCOL 3350 17 G: 17 POWDER, FOR SOLUTION ORAL at 09:06

## 2018-02-06 RX ADMIN — Medication 1 MG: at 09:07

## 2018-02-06 RX ADMIN — MEMANTINE 10 MG: 10 TABLET ORAL at 09:06

## 2018-02-06 NOTE — BH NOTES
Discharge instructions and patients prescriptions were given to the caregiver and the  explained about the patients medication to the caregiver. All valuables and belongings were given to the caregiver upon discharge. The patient had no home medications to be returned. Staff took patient off the unit along with the caregiver.

## 2018-02-06 NOTE — BH NOTES
Behavioral Health Transition Record to Provider    Patient Name: Yuri Gallo  YOB: 1940  Medical Record Number: [id number]  Date of Admission: 1/18/2018  Date of Discharge: 2/6/2018     Attending Provider: Rivas Gottlieb MD  Discharging Provider: Rivas Gottlieb MD  To contact this individual call [phone]  and ask the  to page. If unavailable, ask to be transferred to [facility] Provider on call. [facility] Provider will be available on call 24/7 and during holidays. Primary Care Provider: None    Allergies   Allergen Reactions    Erythromycin Hives    Isopropyl Alcohol Hives    Neosporin [Neomycin-Bacitracnzn-Polymyxnb] Rash    Pcn [Penicillins] Rash    Sulfa (Sulfonamide Antibiotics) Rash       Reason for Admission: Pt was agitated at his independent living apartment , needed a medication adjustment.       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 average glucose 114 mg/dL   CBC WITH AUTOMATED DIFF   Result Value Ref Range    WBC 10.6 4.1 - 11.1 K/uL    RBC 4.98 4.10 - 5.70 M/uL    HGB 15.9 12.1 - 17.0 g/dL    HCT 46.9 36.6 - 50.3 %    MCV 94.2 80.0 - 99.0 FL    MCH 31.9 26.0 - 34.0 PG    MCHC 33.9 30.0 - 36.5 g/dL    RDW 12.4 11.5 - 14.5 %    PLATELET 815 045 - 413 K/uL    MPV 10.2 8.9 - 12.9 FL    NRBC 0.0 0  WBC    ABSOLUTE NRBC 0.00 0.00 - 0.01 K/uL    NEUTROPHILS 76 (H) 32 - 75 %    LYMPHOCYTES 13 12 - 49 %    MONOCYTES 10 5 - 13 % EOSINOPHILS 0 0 - 7 %    BASOPHILS 0 0 - 1 %    IMMATURE GRANULOCYTES 0 0.0 - 0.5 %    ABS. NEUTROPHILS 8.1 (H) 1.8 - 8.0 K/UL    ABS. LYMPHOCYTES 1.3 0.8 - 3.5 K/UL    ABS. MONOCYTES 1.1 (H) 0.0 - 1.0 K/UL    ABS. EOSINOPHILS 0.0 0.0 - 0.4 K/UL    ABS. BASOPHILS 0.0 0.0 - 0.1 K/UL    ABS. IMM. GRANS. 0.0 0.00 - 0.04 K/UL    DF AUTOMATED     METABOLIC PANEL, COMPREHENSIVE   Result Value Ref Range    Sodium 143 136 - 145 mmol/L    Potassium 3.6 3.5 - 5.1 mmol/L    Chloride 107 97 - 108 mmol/L    CO2 23 21 - 32 mmol/L    Anion gap 13 5 - 15 mmol/L    Glucose 113 (H) 65 - 100 mg/dL    BUN 26 (H) 6 - 20 MG/DL    Creatinine 1.01 0.70 - 1.30 MG/DL    BUN/Creatinine ratio 26 (H) 12 - 20      GFR est AA >60 >60 ml/min/1.73m2    GFR est non-AA >60 >60 ml/min/1.73m2    Calcium 8.6 8.5 - 10.1 MG/DL    Bilirubin, total 0.8 0.2 - 1.0 MG/DL    ALT (SGPT) 67 12 - 78 U/L    AST (SGOT) 118 (H) 15 - 37 U/L    Alk.  phosphatase 88 45 - 117 U/L    Protein, total 7.0 6.4 - 8.2 g/dL    Albumin 3.2 (L) 3.5 - 5.0 g/dL    Globulin 3.8 2.0 - 4.0 g/dL    A-G Ratio 0.8 (L) 1.1 - 2.2     GLUCOSE, POC   Result Value Ref Range    Glucose (POC) 131 (H) 65 - 100 mg/dL    Performed by [facility]    EKG, 12 LEAD, INITIAL   Result Value Ref Range    Ventricular Rate 101 BPM    Atrial Rate 101 BPM    P-R Interval 172 ms    QRS Duration 146 ms    Q-T Interval 376 ms    QTC Calculation (Bezet) 487 ms    Calculated P Axis 45 degrees    Calculated R Axis -24 degrees    Calculated T Axis 5 degrees    Diagnosis       Normal sinus rhythm  borderline first degree AV block  Abnormal left axis deviation  Right bundle branch block  Confirmed by Dhiraj Vallecillo MD, Luis Roberson ([id number]) on 2/2/2018 4:24:26 PM     EKG, 12 LEAD, SUBSEQUENT   Result Value Ref Range    Ventricular Rate 87 BPM    Atrial Rate 87 BPM    P-R Interval 160 ms    QRS Duration 140 ms    Q-T Interval 416 ms    QTC Calculation (Bezet) 500 ms    Calculated P Axis 58 degrees    Calculated R Axis -9 degrees Calculated T Axis -3 degrees    Diagnosis       Normal sinus rhythm  Right bundle branch block  Abnormal ECG  No previous ECGs available  leftward axis  Confirmed by Mini Gardner MD, Bere Tate ([id number]) on 2/2/2018 4:26:05 PM         Immunizations administered during this encounter: There is no immunization history on file for this patient. Screening for Metabolic Disorders for Patients on Antipsychotic Medications  (Data obtained from the EMR)    Estimated Body Mass Index  Estimated body mass index is 21.95 kg/(m^2) as calculated from the following:    Height as of this encounter: 5' 10\" (1.778 m). Weight as of this encounter: 69.4 kg (153 lb). Vital Signs/Blood Pressure  Visit Vitals    BP (!) 111/91    Pulse (!) 156    Temp 97.9 °F (36.6 °C)    Resp 16    Ht 5' 10\" (1.778 m)    Wt 69.4 kg (153 lb)    SpO2 98%    BMI 21.95 kg/m2       Blood Glucose/Hemoglobin A1c  Lab Results   Component Value Date/Time    Glucose 113 02/01/2018 04:42 AM    Glucose (POC) 131 01/30/2018 11:24 AM       Lab Results   Component Value Date/Time    Hemoglobin A1c 5.6 01/20/2018 04:06 AM        Lipid Panel  Lab Results   Component Value Date/Time    Cholesterol, total 193 01/20/2018 04:06 AM    HDL Cholesterol 48 01/20/2018 04:06 AM    LDL, calculated 124.2 01/20/2018 04:06 AM    Triglyceride 104 01/20/2018 04:06 AM    CHOL/HDL Ratio 4.0 01/20/2018 04:06 AM        Discharge Diagnosis: Dementia     Discharge Plan: Pt will return to his independent living  Apartment , pt will be transported by his caregiver Isaak Peck. Pt will continue follow-up with is outpatient provider    Discharge Medication List and Instructions:   Current Discharge Medication List      START taking these medications    Details   memantine (NAMENDA) 10 mg tablet Take 1 Tab by mouth two (2) times a day for 7 days.  Indications: MODERATE TO SEVERE ALZHEIMER'S TYPE DEMENTIA  Qty: 14 Tab, Refills: 0      mirtazapine (REMERON) 15 mg tablet Take 1 Tab by mouth nightly for 7 days. Indications: major depressive disorder  Qty: 7 Tab, Refills: 0      !! risperiDONE (RISPERDAL) 1 mg/mL oral solution Take 1 mL by mouth daily for 7 days. Indications: DEPRESSION TREATMENT ADJUNCT, psychosis  Qty: 7 mL, Refills: 0      !! risperiDONE (RISPERDAL) 1 mg/mL oral solution Take 3 mL by mouth nightly for 7 days. Indications: DEPRESSION TREATMENT ADJUNCT, psychosis  Qty: 21 mL, Refills: 0       !! - Potential duplicate medications found. Please discuss with provider. CONTINUE these medications which have CHANGED    Details   !! aspirin 81 mg chewable tablet Take 1 Tab by mouth daily for 7 days. Indications: myocardial infarction prevention, prevention of cerebrovascular accident  Qty: 7 Tab, Refills: 0      !! finasteride (PROSCAR) 5 mg tablet Take 1 Tab by mouth daily for 7 days. Indications: benign prostatic hyperplasia with lower urinary tract sx  Qty: 7 Tab, Refills: 0      !! polyethylene glycol (MIRALAX) 17 gram packet Take 1 Packet by mouth daily for 7 days. Indications: constipation  Qty: 7 Packet, Refills: 0      !! senna (SENNA) 8.6 mg tablet Take 2 Tabs by mouth nightly for 7 days. Indications: constipation  Qty: 14 Tab, Refills: 0      !! tamsulosin (FLOMAX) 0.4 mg capsule Take 1 Cap by mouth daily for 7 days. Indications: benign prostatic hyperplasia with lower urinary tract sx  Qty: 7 Cap, Refills: 0       !! - Potential duplicate medications found. Please discuss with provider. CONTINUE these medications which have NOT CHANGED    Details   !! senna (SENNA) 8.6 mg tablet Take 2 Tabs by mouth nightly. Indications: constipation      triamcinolone acetonide (KENALOG) 0.1 % topical cream Apply  to affected area two (2) times daily as needed for Skin Irritation. use thin layer      !! aspirin 81 mg chewable tablet Take 81 mg by mouth daily.  Indications: myocardial infarction prevention, prevention of cerebrovascular accident      bisacodyl (DULCOLAX, BISACODYL,) 10 mg suppository Insert 10 mg into rectum daily as needed (Constipation). !! finasteride (PROSCAR) 5 mg tablet Take 5 mg by mouth daily. Indications: benign prostatic hyperplasia with lower urinary tract sx      ketoconazole (NIZORAL) 2 % shampoo Apply  to affected area daily as needed for Itching. Patient typically uses 2-3 times/week      !! polyethylene glycol (MIRALAX) 17 gram packet Take 17 g by mouth daily. Indications: constipation      !! tamsulosin (FLOMAX) 0.4 mg capsule Take 0.4 mg by mouth daily. Indications: benign prostatic hyperplasia with lower urinary tract sx       !! - Potential duplicate medications found. Please discuss with provider. STOP taking these medications       risperiDONE (RISPERDAL) 1 mg tablet Comments:   Reason for Stopping:         risperiDONE (RISPERDAL) 0.5 mg tablet Comments:   Reason for Stopping:         ondansetron hcl (ZOFRAN, AS HYDROCHLORIDE,) 4 mg tablet Comments:   Reason for Stopping:               Unresulted Labs     None        To obtain results of studies pending at discharge, please contact: N/A     Follow-up Information     Follow up With Details Comments Contact Info    [facility]   Appointment 2/8/18 @ 1:15pm with Dr. Gianni Green, [address]  Phone: [phone]  Fax: [phone]    None   None [phone]) Patient stated that they have no PCP            Advanced Directive:   Does the patient have an appointed surrogate decision maker? Yes  Does the patient have a Medical Advance Directive? Yes  Does the patient have a Psychiatric Advance Directive? Yes  If the patient does not have a surrogate or Medical Advance Directive AND Psychiatric Advance Directive, the patient was offered information on these advance directives Yes    Patient Instructions: Please continue all medications until otherwise directed by physician.   Review discharge paperwork     Tobacco Cessation Discharge Plan:   Is the patient a smoker and needs referral for smoking cessation? No  Patient referred to the following for smoking cessation with an appointment? No     Patient was offered medication to assist with smoking cessation at discharge? No  Was education for smoking cessation added to the discharge instructions? No    Alcohol/Substance Abuse Discharge Plan:   Does the patient have a history of substance/alcohol abuse and requires a referral for treatment? No  Patient referred to the following for substance/alcohol abuse treatment with an appointment? No  Patient was offered medication to assist with alcohol cessation at discharge? No  Was education for substance/alcohol abuse added to discharge instructions? No    Patient discharged to Home; discussed with patient/caregiver.

## 2018-02-06 NOTE — BH NOTES
Pt observed resting comfortably in bed with eyes closed. Breathing even and unlabored. No s/s distress, no complaints voiced. Pt slept 7 hours. Will continue to monitor for safety, Q15 min.

## 2018-02-06 NOTE — BH NOTES
Problem: Dementia-BEHAVIORAL HEALTH (Adult/Pediatric)  Goal: *STG: Remains safe in hospital  Outcome: Progressing Towards Goal  Pt is more cooperative in the unit this evening. Pt is meds/meals compliant. Pt is following more directions today. Pt is eating better and sleeping better. No PRN medication is needed. Pt was visited by his care taker in the unit tonight. Will continue to monitor q 15 for safety, mood and behavior changes.

## 2018-02-06 NOTE — DISCHARGE INSTRUCTIONS
DISCHARGE SUMMARY from Nurse    The following personal items are in your possession at time of discharge:    Dental Appliances: None  Visual Aid: None     Home Medications: None  Jewelry: Watch  Clothing: Pants, Slippers, Shirt, Undergarments, With patient, Footwear  Other Valuables: Suitcase, Wallet, Walker  Personal Items Sent to Safe:  (wallet)      PATIENT INSTRUCTIONS:        If I feel that I can not keep these promises and I am at risk of hurting myself or others, I will call the crisis office and speak with a crisis worker who will assist me during my crisis. [facility]  [address]  [address]  [address]         Lifestyle Tips:  Appointment after discharge and follow up phone call:   After being discharged, if your appointment does not work for you, please feel free to contact the physician's office to change the appointment. Medications: Take your medicines exactly as instructed. Ask your doctor or nurse if you have questions about your medicines. Tell your doctor right away if you have problems with your medicines. Activity:   Ask your doctor how active you should be. Remember to take rest breaks. Do not cross your legs when sitting. Elevate your legs when you can. Diet:   Follow any special diet orders from your doctor. Ask your doctor how much salt (sodium) you should have each day. Ask if you need to limit the amount or types of fluids you drink each day. Weight Monitoring: If you are overweight, ask about a weight loss plan that is right for you. Weight gain may mean that fluids are building up in your body. Weigh yourself every day at about the same time and write it down. Do Not Smoke: If you smoke, quit. Smoking is bad for your health. Avoid second hand smoke.    Call the [facility] for help at 9-734.202.4898 (1-800-Quit - Now)  Other Tips:   Avoid people with the flu or pneumonia   Keep all of your doctor appointments   Carry a list of your medications, allergies and the shots you have had              These are general instructions for a healthy lifestyle:    No smoking/ No tobacco products/ Avoid exposure to second hand smoke    Surgeon General's Warning:  Quitting smoking now greatly reduces serious risk to your health. Obesity, smoking, and sedentary lifestyle greatly increases your risk for illness    A healthy diet, regular physical exercise & weight monitoring are important for maintaining a healthy lifestyle    You may be retaining fluid if you have a history of heart failure or if you experience any of the following symptoms:  Weight gain of 3 pounds or more overnight or 5 pounds in a week, increased swelling in our hands or feet or shortness of breath while lying flat in bed. Please call your doctor as soon as you notice any of these symptoms; do not wait until your next office visit. Recognize signs and symptoms of STROKE:    F-face looks uneven    A-arms unable to move or move unevenly    S-speech slurred or non-existent    T-time-call 911 as soon as signs and symptoms begin-DO NOT go       Back to bed or wait to see if you get better-TIME IS BRAIN. Warning Signs of HEART ATTACK     Call 911 if you have these symptoms:   Chest discomfort. Most heart attacks involve discomfort in the center of the chest that lasts more than a few minutes, or that goes away and comes back. It can feel like uncomfortable pressure, squeezing, fullness, or pain.  Discomfort in other areas of the upper body. Symptoms can include pain or discomfort in one or both arms, the back, neck, jaw, or stomach.  Shortness of breath with or without chest discomfort.  Other signs may include breaking out in a cold sweat, nausea, or lightheadedness. Don't wait more than five minutes to call 911 - MINUTES MATTER!  Fast action can save your life. Calling 911 is almost always the fastest way to get lifesaving treatment. Emergency Medical Services staff can begin treatment when they arrive -- up to an hour sooner than if someone gets to the hospital by car. Myself and/or my family have received education about my diagnosis throughout my hospital stay. During my hospital stay, I and/or my family/caregiver were included in planning my care upon discharge. My needs were taken into consideration and I was included in my discharge planning. I had an opportunity to ask questions. The discharge information has been reviewed with the patient. The patient verbalized understanding. Discharge medications reviewed with the patient and appropriate educational materials and side effects teaching were provided.

## 2018-02-06 NOTE — DISCHARGE SUMMARY
PSYCHIATRIC DISCHARGE SUMMARY         IDENTIFICATION:    Patient Name  Adam Shaw   Date of Birth 1940   [facility] [id number]   Medical Record Number  [id number]      Age  76 y.o. PCP None   Admit date:  1/18/2018    Discharge date: 2/6/2018   Room Number  321/01  @ [facility]   Date of Service  2/6/2018            TYPE OF DISCHARGE: REGULAR               CONDITION AT DISCHARGE: stable       PROVISIONAL & DISCHARGE DIAGNOSES:    Problem List  Never Reviewed          Codes Class    Late onset Alzheimer's disease with behavioral disturbance ICD-10-CM: G30.1, F02.81  ICD-9-CM: 331.0, 294.11         Agitation ICD-10-CM: R45.1  ICD-9-CM: 307.9         * (Principal)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 ICD-10-CM: G30.8, F02.81  ICD-9-CM: 331.0, 294.11               Active Hospital Problems    Agitation      *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        DISCHARGE DIAGNOSIS:   Axis I:  SEE ABOVE  Axis II: SEE ABOVE  Axis III: SEE ABOVE  Axis IV:  lack of structure  Axis V:  20 on admission, 50 on discharge 50(baseline)       CC & HISTORY OF PRESENT ILLNESS:  The patient, Adam Shaw, is a 76 y.o. WHITE OR  male with a past psychiatric history significant for dementia, who presents at this time with complaints of (and/or evidence of) the following emotional symptoms: agitation and memory impairment. Additional symptomatology include poor attention, confused, disoriented to time and place. He is a poor historian, distractible, loud and wandering behavior. The above symptoms have been present since last october. These symptoms are of severe severity. These symptoms are constant  in nature. The patient's condition has been precipitated by and psychosocial stressors ( ). Patient's condition made worse by treatment noncompliance.  UDS: +benzo; BAL=0.        SOCIAL HISTORY:    Social History     Social History    Marital status: SINGLE     Spouse name: N/A    Number of children: N/A    Years of education: N/A     Occupational History    Not on file. 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 HOSPITALIZATION COURSE:    Ramiro Gautam was admitted to the inpatient psychiatric unit [facility] for acute psychiatric stabilization in regards to symptomatology as described in the HPI above. While on the unit Ramiro Gautam was involved in individual, group, occupational and milieu therapy. Psychiatric medications were adjusted during this hospitalization including changing to liquid Risperdal and adding Namenda and Remeron. Ramiro Gautam demonstrated a slow, but progressive improvement in overall condition. Much of patient's depression appeared to be related to situational stressors, and psychological factors. Please see individual progress notes for more specific details regarding patient's hospitalization course. At time of discharge, Ramiro Gautam is without significant problems of depression psychosis  [place]. Patient free of suicidal and homicidal ideations (appears to be at very low risk of suicide or homicide) and reports many positive predictive factors in terms of not attempting suicide or homicide. Patient with request for discharge today. There are no grounds to seek a TDO. Patient has maximized benefit to be derived from acute inpatient psychiatric treatment.   All members of the treatment team concur with each other in regards to plans for discharge today per patient's request.  Patient and family are aware and in agreement with discharge and discharge plan. Per my last note:   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 (1/19/2018)    Assessment: progressing- no agitation or physical aggression- poor memory, no behavior problem- CT head- no acute change. Plan: will ct with current med- Namenda, liq Risperdal, Remeron. May be at his baseline-discussed with Dr Alfonzo Ayala (pt's Psychiatrist) and updated her with med changes/ behavior management- tend to work well with known staff/ need redirection when confused ( has episode of yelling and screaming but with redirection. He does well), tend to nap during daytime and will need activities to keep him occupied during the day time. Cautious with AP due to prolonged  QTc. DC Geodon prn due to QTC prolongation rsik     Agitation (1/19/2018)    Assessment: improved,  No aggressive behavior    Plan: no  prn med            LABS AND IMAGAING:    Labs Reviewed   LIPID PANEL - Abnormal; Notable for the following:        Result Value    LDL, calculated 124.2 (*)     All other components within normal limits   CBC WITH AUTOMATED DIFF - Abnormal; Notable for the following:     NEUTROPHILS 76 (*)     ABS. NEUTROPHILS 8.1 (*)     ABS.  MONOCYTES 1.1 (*)     All other components within normal limits   METABOLIC PANEL, COMPREHENSIVE - Abnormal; Notable for the following:     Glucose 113 (*)     BUN 26 (*)     BUN/Creatinine ratio 26 (*)     AST (SGOT) 118 (*)     Albumin 3.2 (*)     A-G Ratio 0.8 (*)     All other components within normal limits   GLUCOSE, POC - Abnormal; Notable for the following:     Glucose (POC) 131 (*)     All other components within normal limits   HEMOGLOBIN A1C WITH EAG     No results found for: DS35, PHEN, PHENO, PHENT, DILF, DS39, PHENY, PTN, VALF2, VALAC, VALP, VALPR, DS6, CRBAM, CRBAMP, CARB2, XCRBAM  Admission on 01/18/2018   Component Date Value Ref Range Status    LIPID PROFILE 01/20/2018        Final    Cholesterol, total 01/20/2018 193 <200 MG/DL Final    Triglyceride 01/20/2018 104  <150 MG/DL Final    HDL Cholesterol 01/20/2018 48  MG/DL Final    LDL, calculated 01/20/2018 124.2* 0 - 100 MG/DL Final    VLDL, calculated 01/20/2018 20.8  MG/DL Final    CHOL/HDL Ratio 01/20/2018 4.0  0 - 5.0   Final    Hemoglobin A1c 01/20/2018 5.6  4.2 - 6.3 % Final    Est. average glucose 01/20/2018 114  mg/dL Final    Glucose (POC) 01/30/2018 131* 65 - 100 mg/dL Final    Performed by 01/30/2018 [facility]   Final    Ventricular Rate 02/01/2018 101  BPM Final    Atrial Rate 02/01/2018 101  BPM Final    P-R Interval 02/01/2018 172  ms Final    QRS Duration 02/01/2018 146  ms Final    Q-T Interval 02/01/2018 376  ms Final    QTC Calculation (Bezet) 02/01/2018 487  ms Final    Calculated P Axis 02/01/2018 45  degrees Final    Calculated R Axis 02/01/2018 -24  degrees Final    Calculated T Axis 02/01/2018 5  degrees Final    Diagnosis 02/01/2018    Final                    Value:Normal sinus rhythm  borderline first degree AV block  Abnormal left axis deviation  Right bundle branch block  Confirmed by [facility] MD, [place] () on 2/2/2018 4:24:26 PM      WBC 02/01/2018 10.6  4.1 - 11.1 K/uL Final    RBC 02/01/2018 4.98  4.10 - 5.70 M/uL Final    HGB 02/01/2018 15.9  12.1 - 17.0 g/dL Final    HCT 02/01/2018 46.9  36.6 - 50.3 % Final    MCV 02/01/2018 94.2  80.0 - 99.0 FL Final    MCH 02/01/2018 31.9  26.0 - 34.0 PG Final    MCHC 02/01/2018 33.9  30.0 - 36.5 g/dL Final    RDW 02/01/2018 12.4  11.5 - 14.5 % Final    PLATELET 97/01/8813 133  150 - 400 K/uL Final    MPV 02/01/2018 10.2  8.9 - 12.9 FL Final    NRBC 02/01/2018 0.0  0  WBC Final    ABSOLUTE NRBC 02/01/2018 0.00  0.00 - 0.01 K/uL Final    NEUTROPHILS 02/01/2018 76* 32 - 75 % Final    LYMPHOCYTES 02/01/2018 13  12 - 49 % Final    MONOCYTES 02/01/2018 10  5 - 13 % Final    EOSINOPHILS 02/01/2018 0  0 - 7 % Final    BASOPHILS 02/01/2018 0  0 - 1 % Final    IMMATURE GRANULOCYTES 02/01/2018 0  0.0 - 0.5 % Final    ABS. NEUTROPHILS 02/01/2018 8.1* 1.8 - 8.0 K/UL Final    ABS. LYMPHOCYTES 02/01/2018 1.3  0.8 - 3.5 K/UL Final    ABS. MONOCYTES 02/01/2018 1.1* 0.0 - 1.0 K/UL Final    ABS. EOSINOPHILS 02/01/2018 0.0  0.0 - 0.4 K/UL Final    ABS. BASOPHILS 02/01/2018 0.0  0.0 - 0.1 K/UL Final    ABS. IMM. GRANS. 02/01/2018 0.0  0.00 - 0.04 K/UL Final    DF 02/01/2018 AUTOMATED    Final    Sodium 02/01/2018 143  136 - 145 mmol/L Final    Potassium 02/01/2018 3.6  3.5 - 5.1 mmol/L Final    Chloride 02/01/2018 107  97 - 108 mmol/L Final    CO2 02/01/2018 23  21 - 32 mmol/L Final    Anion gap 02/01/2018 13  5 - 15 mmol/L Final    Glucose 02/01/2018 113* 65 - 100 mg/dL Final    BUN 02/01/2018 26* 6 - 20 MG/DL Final    Creatinine 02/01/2018 1.01  0.70 - 1.30 MG/DL Final    BUN/Creatinine ratio 02/01/2018 26* 12 - 20   Final    GFR est AA 02/01/2018 >60  >60 ml/min/1.73m2 Final    GFR est non-AA 02/01/2018 >60  >60 ml/min/1.73m2 Final    Calcium 02/01/2018 8.6  8.5 - 10.1 MG/DL Final    Bilirubin, total 02/01/2018 0.8  0.2 - 1.0 MG/DL Final    ALT (SGPT) 02/01/2018 67  12 - 78 U/L Final    AST (SGOT) 02/01/2018 118* 15 - 37 U/L Final    Alk.  phosphatase 02/01/2018 88  45 - 117 U/L Final    Protein, total 02/01/2018 7.0  6.4 - 8.2 g/dL Final    Albumin 02/01/2018 3.2* 3.5 - 5.0 g/dL Final    Globulin 02/01/2018 3.8  2.0 - 4.0 g/dL Final    A-G Ratio 02/01/2018 0.8* 1.1 - 2.2   Final    Ventricular Rate 02/01/2018 87  BPM Final    Atrial Rate 02/01/2018 87  BPM Final    P-R Interval 02/01/2018 160  ms Final    QRS Duration 02/01/2018 140  ms Final    Q-T Interval 02/01/2018 416  ms Final    QTC Calculation (Bezet) 02/01/2018 500  ms Final    Calculated P Axis 02/01/2018 58  degrees Final    Calculated R Axis 02/01/2018 -9  degrees Final    Calculated T Axis 02/01/2018 -3  degrees Final    Diagnosis 02/01/2018    Final Value:Normal sinus rhythm  Right bundle branch block  Abnormal ECG  No previous ECGs available  leftward axis  Confirmed by Nena Chen MD, Piedad Barthel ([id number]) on 2/2/2018 4:26:05 PM       Ct Head Wo Cont    Result Date: 1/30/2018  EXAM:  CT HEAD WO CONT INDICATION:   Dementia; Altered mental status. Possible fall. COMPARISON: None. CONTRAST:  None. TECHNIQUE:  Contiguous CT images were obtained of the head without contrast.  Coronal and sagittal reformatted images were obtained. CT dose reduction was achieved through the use of a standardized protocol tailored for this examination and automatic exposure control for dose modulation. FINDINGS:  Atrophic change and slight periventricular hypoattenuation suggesting microvascular ischemic change are seen. No acute hemorrhage, mass effect, mass lesion, midline shift or extra axial collection is evident. The basilar cisterns are open. No acute infarct is identified. The visualized paranasal sinuses and mastoid air cells are clear. IMPRESSION:  Atrophy. No acute intracranial bleed/shift of the midline. DISPOSITION:    Home. Patient to f/u with psychiatric, and psychotherapy appointments. Patient is to f/u with internist as directed. FOLLOW-UP CARE:    Activity as tolerated  Low fat, Low cholesterol  Wound Care: none needed. Follow-up Information     Follow up With Details Comments Contact Info    [facility]   Appointment 2/8/18 @ 1:15pm with Dr. Libby Forman, [address]  Phone: [phone]  Fax: [phone]    None   None ([phone]) Patient stated that they have no PCP                   PROGNOSIS:   Poor---- based on nature of patient's pathology/ies and treatment compliance issues. Prognosis is greatly dependent upon patient's ability to follow up with psychiatric/psychotherapy appointments as well as to comply with psychiatric medications as prescribed.             DISCHARGE MEDICATIONS: Informed consent given for the use of following psychotropic medications:  Current Discharge Medication List      START taking these medications    Details   memantine (NAMENDA) 10 mg tablet Take 1 Tab by mouth two (2) times a day for 7 days. Indications: MODERATE TO SEVERE ALZHEIMER'S TYPE DEMENTIA  Qty: 14 Tab, Refills: 0      mirtazapine (REMERON) 15 mg tablet Take 1 Tab by mouth nightly for 7 days. Indications: major depressive disorder  Qty: 7 Tab, Refills: 0      !! risperiDONE (RISPERDAL) 1 mg/mL oral solution Take 1 mL by mouth daily for 7 days. Indications: DEPRESSION TREATMENT ADJUNCT, psychosis  Qty: 7 mL, Refills: 0      !! risperiDONE (RISPERDAL) 1 mg/mL oral solution Take 3 mL by mouth nightly for 7 days. Indications: DEPRESSION TREATMENT ADJUNCT, psychosis  Qty: 21 mL, Refills: 0       !! - Potential duplicate medications found. Please discuss with provider. CONTINUE these medications which have CHANGED    Details   !! aspirin 81 mg chewable tablet Take 1 Tab by mouth daily for 7 days. Indications: myocardial infarction prevention, prevention of cerebrovascular accident  Qty: 7 Tab, Refills: 0      !! finasteride (PROSCAR) 5 mg tablet Take 1 Tab by mouth daily for 7 days. Indications: benign prostatic hyperplasia with lower urinary tract sx  Qty: 7 Tab, Refills: 0      !! polyethylene glycol (MIRALAX) 17 gram packet Take 1 Packet by mouth daily for 7 days. Indications: constipation  Qty: 7 Packet, Refills: 0      !! senna (SENNA) 8.6 mg tablet Take 2 Tabs by mouth nightly for 7 days. Indications: constipation  Qty: 14 Tab, Refills: 0      !! tamsulosin (FLOMAX) 0.4 mg capsule Take 1 Cap by mouth daily for 7 days. Indications: benign prostatic hyperplasia with lower urinary tract sx  Qty: 7 Cap, Refills: 0       !! - Potential duplicate medications found. Please discuss with provider.       CONTINUE these medications which have NOT CHANGED    Details   !! senna (SENNA) 8.6 mg tablet Take 2 Tabs by mouth nightly. Indications: constipation      triamcinolone acetonide (KENALOG) 0.1 % topical cream Apply  to affected area two (2) times daily as needed for Skin Irritation. use thin layer      !! aspirin 81 mg chewable tablet Take 81 mg by mouth daily. Indications: myocardial infarction prevention, prevention of cerebrovascular accident      bisacodyl (DULCOLAX, BISACODYL,) 10 mg suppository Insert 10 mg into rectum daily as needed (Constipation). !! finasteride (PROSCAR) 5 mg tablet Take 5 mg by mouth daily. Indications: benign prostatic hyperplasia with lower urinary tract sx      ketoconazole (NIZORAL) 2 % shampoo Apply  to affected area daily as needed for Itching. Patient typically uses 2-3 times/week      !! polyethylene glycol (MIRALAX) 17 gram packet Take 17 g by mouth daily. Indications: constipation      !! tamsulosin (FLOMAX) 0.4 mg capsule Take 0.4 mg by mouth daily. Indications: benign prostatic hyperplasia with lower urinary tract sx       !! - Potential duplicate medications found. Please discuss with provider. STOP taking these medications       risperiDONE (RISPERDAL) 1 mg tablet Comments:   Reason for Stopping:         risperiDONE (RISPERDAL) 0.5 mg tablet Comments:   Reason for Stopping:         ondansetron hcl (ZOFRAN, AS HYDROCHLORIDE,) 4 mg tablet Comments:   Reason for Stopping:                      A coordinated, multidisplinary treatment team round was conducted with Lizett Baez---this is done daily here at [facility]. This team consists of the nurse, psychiatric unit pharmcist,  and writer. I have spent greater than 35 minutes on discharge work.     Signed:  Alfonzo Mcdowell MD  2/6/2018

## 2018-04-21 NOTE — BH NOTES
Continue to hold diauretics     Janel Uribe who was absent from group.     Roy Brothers  2/2/2018  2:29 PM

## 2025-06-04 NOTE — PROGRESS NOTES
Denied [id number][place]   Problem: Altered Thought Process (Adult/Pediatric)  Goal: *STG: Remains safe in hospital  Outcome: Progressing Towards Goal  Q15 min checks performed for safety. Patient requires close supervision. Patient affect is flat, mood irritable. Patient sometimes responds to directives by yelling \"alright\" and banging, but he usually follows directions. He cursed once, but when cursing and tone of voice was ignored patient attempted to to follow directions. Patient wandered in the halls and attempted to go into other patient's room. Name was placed on his door. Patient went to bathroom frequently. Patient ambulated on unit without his walker and his gait was steady. Will remain calm with patient while provideng carre.